# Patient Record
Sex: FEMALE | Race: WHITE | NOT HISPANIC OR LATINO | Employment: OTHER | URBAN - METROPOLITAN AREA
[De-identification: names, ages, dates, MRNs, and addresses within clinical notes are randomized per-mention and may not be internally consistent; named-entity substitution may affect disease eponyms.]

---

## 2018-07-11 ENCOUNTER — HOSPITAL ENCOUNTER (INPATIENT)
Facility: HOSPITAL | Age: 63
LOS: 9 days | Discharge: HOME HEALTH | End: 2018-07-20
Attending: SPECIALIST | Admitting: SPECIALIST

## 2018-07-11 ENCOUNTER — HOSPITAL ENCOUNTER (EMERGENCY)
Facility: HOSPITAL | Age: 63
End: 2018-07-11
Attending: EMERGENCY MEDICINE

## 2018-07-11 VITALS
OXYGEN SATURATION: 98 % | RESPIRATION RATE: 18 BRPM | SYSTOLIC BLOOD PRESSURE: 136 MMHG | WEIGHT: 185 LBS | HEIGHT: 65 IN | HEART RATE: 110 BPM | BODY MASS INDEX: 30.82 KG/M2 | TEMPERATURE: 97.9 F | DIASTOLIC BLOOD PRESSURE: 102 MMHG

## 2018-07-11 DIAGNOSIS — F31.9 BIPOLAR 1 DISORDER (HCC): Primary | ICD-10-CM

## 2018-07-11 LAB
ALBUMIN SERPL-MCNC: 3.4 G/DL (ref 3.5–5.2)
ALBUMIN/GLOB SERPL: 0.9 G/DL
ALP SERPL-CCNC: 83 U/L (ref 39–117)
ALT SERPL W P-5'-P-CCNC: 130 U/L (ref 1–33)
AMPHET+METHAMPHET UR QL: NEGATIVE
ANION GAP SERPL CALCULATED.3IONS-SCNC: 14.7 MMOL/L
AST SERPL-CCNC: 128 U/L (ref 1–32)
BACTERIA UR QL AUTO: NORMAL /HPF
BARBITURATES UR QL SCN: NEGATIVE
BASOPHILS # BLD AUTO: 0.02 10*3/MM3 (ref 0–0.2)
BASOPHILS NFR BLD AUTO: 0.2 % (ref 0–1.5)
BENZODIAZ UR QL SCN: NEGATIVE
BILIRUB SERPL-MCNC: 0.5 MG/DL (ref 0.1–1.2)
BILIRUB UR QL STRIP: NEGATIVE
BUN BLD-MCNC: 19 MG/DL (ref 8–23)
BUN/CREAT SERPL: 23.8 (ref 7–25)
CALCIUM SPEC-SCNC: 8.4 MG/DL (ref 8.6–10.5)
CANNABINOIDS SERPL QL: NEGATIVE
CHLORIDE SERPL-SCNC: 97 MMOL/L (ref 98–107)
CHOLEST SERPL-MCNC: 132 MG/DL (ref 0–200)
CLARITY UR: CLEAR
CO2 SERPL-SCNC: 23.3 MMOL/L (ref 22–29)
COCAINE UR QL: NEGATIVE
COLOR UR: ABNORMAL
CREAT BLD-MCNC: 0.8 MG/DL (ref 0.57–1)
DEPRECATED RDW RBC AUTO: 48.8 FL (ref 37–54)
EOSINOPHIL # BLD AUTO: 0.01 10*3/MM3 (ref 0–0.7)
EOSINOPHIL NFR BLD AUTO: 0.1 % (ref 0.3–6.2)
ERYTHROCYTE [DISTWIDTH] IN BLOOD BY AUTOMATED COUNT: 15.7 % (ref 11.7–13)
ETHANOL BLD-MCNC: <10 MG/DL (ref 0–10)
ETHANOL UR QL: <0.01 %
GFR SERPL CREATININE-BSD FRML MDRD: 72 ML/MIN/1.73
GLOBULIN UR ELPH-MCNC: 4 GM/DL
GLUCOSE BLD-MCNC: 282 MG/DL (ref 65–99)
GLUCOSE UR STRIP-MCNC: ABNORMAL MG/DL
HCT VFR BLD AUTO: 39.3 % (ref 35.6–45.5)
HDLC SERPL-MCNC: 33 MG/DL (ref 40–60)
HGB BLD-MCNC: 12.4 G/DL (ref 11.9–15.5)
HGB UR QL STRIP.AUTO: NEGATIVE
HYALINE CASTS UR QL AUTO: NORMAL /LPF
IMM GRANULOCYTES # BLD: 0.02 10*3/MM3 (ref 0–0.03)
IMM GRANULOCYTES NFR BLD: 0.2 % (ref 0–0.5)
KETONES UR QL STRIP: ABNORMAL
LDLC SERPL CALC-MCNC: 73 MG/DL (ref 0–100)
LDLC/HDLC SERPL: 2.21 {RATIO}
LEUKOCYTE ESTERASE UR QL STRIP.AUTO: NEGATIVE
LYMPHOCYTES # BLD AUTO: 2 10*3/MM3 (ref 0.9–4.8)
LYMPHOCYTES NFR BLD AUTO: 17.7 % (ref 19.6–45.3)
MCH RBC QN AUTO: 26.8 PG (ref 26.9–32)
MCHC RBC AUTO-ENTMCNC: 31.6 G/DL (ref 32.4–36.3)
MCV RBC AUTO: 84.9 FL (ref 80.5–98.2)
METHADONE UR QL SCN: NEGATIVE
MONOCYTES # BLD AUTO: 0.7 10*3/MM3 (ref 0.2–1.2)
MONOCYTES NFR BLD AUTO: 6.2 % (ref 5–12)
NEUTROPHILS # BLD AUTO: 8.54 10*3/MM3 (ref 1.9–8.1)
NEUTROPHILS NFR BLD AUTO: 75.8 % (ref 42.7–76)
NITRITE UR QL STRIP: NEGATIVE
OPIATES UR QL: NEGATIVE
OXYCODONE UR QL SCN: NEGATIVE
PH UR STRIP.AUTO: <=5 [PH] (ref 5–8)
PLATELET # BLD AUTO: 246 10*3/MM3 (ref 140–500)
PMV BLD AUTO: 10.7 FL (ref 6–12)
POTASSIUM BLD-SCNC: 3.1 MMOL/L (ref 3.5–5.2)
PROT SERPL-MCNC: 7.4 G/DL (ref 6–8.5)
PROT UR QL STRIP: ABNORMAL
RBC # BLD AUTO: 4.63 10*6/MM3 (ref 3.9–5.2)
RBC # UR: NORMAL /HPF
REF LAB TEST METHOD: NORMAL
SODIUM BLD-SCNC: 135 MMOL/L (ref 136–145)
SP GR UR STRIP: >=1.03 (ref 1–1.03)
SQUAMOUS #/AREA URNS HPF: NORMAL /HPF
T4 FREE SERPL-MCNC: 1.29 NG/DL (ref 0.93–1.7)
TRIGL SERPL-MCNC: 130 MG/DL (ref 0–150)
TSH SERPL DL<=0.05 MIU/L-ACNC: 2.03 MIU/ML (ref 0.27–4.2)
UROBILINOGEN UR QL STRIP: ABNORMAL
VLDLC SERPL-MCNC: 26 MG/DL (ref 5–40)
WBC NRBC COR # BLD: 11.27 10*3/MM3 (ref 4.5–10.7)
WBC UR QL AUTO: NORMAL /HPF

## 2018-07-11 PROCEDURE — 81001 URINALYSIS AUTO W/SCOPE: CPT | Performed by: PHYSICIAN ASSISTANT

## 2018-07-11 PROCEDURE — 90791 PSYCH DIAGNOSTIC EVALUATION: CPT | Performed by: SOCIAL WORKER

## 2018-07-11 PROCEDURE — 80307 DRUG TEST PRSMV CHEM ANLYZR: CPT | Performed by: PHYSICIAN ASSISTANT

## 2018-07-11 PROCEDURE — 84439 ASSAY OF FREE THYROXINE: CPT | Performed by: SPECIALIST

## 2018-07-11 PROCEDURE — 80053 COMPREHEN METABOLIC PANEL: CPT | Performed by: PHYSICIAN ASSISTANT

## 2018-07-11 PROCEDURE — 85025 COMPLETE CBC W/AUTO DIFF WBC: CPT | Performed by: PHYSICIAN ASSISTANT

## 2018-07-11 PROCEDURE — 25010000002 ZIPRASIDONE MESYLATE PER 10 MG: Performed by: SPECIALIST

## 2018-07-11 PROCEDURE — 80061 LIPID PANEL: CPT | Performed by: SPECIALIST

## 2018-07-11 PROCEDURE — 84443 ASSAY THYROID STIM HORMONE: CPT | Performed by: SPECIALIST

## 2018-07-11 RX ORDER — LOSARTAN POTASSIUM 25 MG/1
25 TABLET ORAL DAILY
Status: ON HOLD | COMMUNITY
End: 2022-08-19

## 2018-07-11 RX ORDER — HYDROCHLOROTHIAZIDE 25 MG/1
25 TABLET ORAL DAILY
COMMUNITY
End: 2022-08-20 | Stop reason: HOSPADM

## 2018-07-11 RX ORDER — DONEPEZIL HYDROCHLORIDE 10 MG/1
10 TABLET, FILM COATED ORAL 2 TIMES DAILY
COMMUNITY
End: 2018-07-20 | Stop reason: HOSPADM

## 2018-07-11 RX ORDER — ACETAMINOPHEN 325 MG/1
650 TABLET ORAL EVERY 4 HOURS PRN
Status: DISCONTINUED | OUTPATIENT
Start: 2018-07-11 | End: 2018-07-20 | Stop reason: HOSPADM

## 2018-07-11 RX ORDER — OLANZAPINE 10 MG/1
10 TABLET, ORALLY DISINTEGRATING ORAL ONCE
Status: COMPLETED | OUTPATIENT
Start: 2018-07-11 | End: 2018-07-11

## 2018-07-11 RX ORDER — ZIPRASIDONE MESYLATE 20 MG/ML
20 INJECTION, POWDER, LYOPHILIZED, FOR SOLUTION INTRAMUSCULAR ONCE
Status: COMPLETED | OUTPATIENT
Start: 2018-07-11 | End: 2018-07-11

## 2018-07-11 RX ORDER — SODIUM CHLORIDE 0.9 % (FLUSH) 0.9 %
10 SYRINGE (ML) INJECTION AS NEEDED
Status: DISCONTINUED | OUTPATIENT
Start: 2018-07-11 | End: 2018-07-11 | Stop reason: HOSPADM

## 2018-07-11 RX ORDER — ARIPIPRAZOLE 5 MG/1
5 TABLET ORAL DAILY
COMMUNITY
End: 2018-07-20 | Stop reason: HOSPADM

## 2018-07-11 RX ORDER — DONEPEZIL HYDROCHLORIDE 10 MG/1
10 TABLET, FILM COATED ORAL 2 TIMES DAILY
Status: DISCONTINUED | OUTPATIENT
Start: 2018-07-12 | End: 2018-07-12 | Stop reason: SDUPTHER

## 2018-07-11 RX ORDER — PANTOPRAZOLE SODIUM 40 MG/1
40 TABLET, DELAYED RELEASE ORAL DAILY
COMMUNITY
End: 2021-10-17 | Stop reason: SDUPTHER

## 2018-07-11 RX ORDER — ALUMINA, MAGNESIA, AND SIMETHICONE 2400; 2400; 240 MG/30ML; MG/30ML; MG/30ML
15 SUSPENSION ORAL EVERY 6 HOURS PRN
Status: DISCONTINUED | OUTPATIENT
Start: 2018-07-11 | End: 2018-07-20 | Stop reason: HOSPADM

## 2018-07-11 RX ADMIN — ZIPRASIDONE MESYLATE 20 MG: 20 INJECTION, POWDER, LYOPHILIZED, FOR SOLUTION INTRAMUSCULAR at 21:37

## 2018-07-11 RX ADMIN — OLANZAPINE 10 MG: 10 TABLET, ORALLY DISINTEGRATING ORAL at 14:34

## 2018-07-11 NOTE — ED PROVIDER NOTES
EMERGENCY DEPARTMENT ENCOUNTER    Room Number:  41/41  Date seen:  7/16/2018  Time seen: 2:12 PM  PCP: No Known Provider  Historian: Patient/Brother      HPI:  Chief complaint: Abnormal behavior  Context: Krupa Mcmanus is a 62 y.o. female who presents to the ED c/o several week h/o declining mental health and inability to care for self.  Pt follows with [facility].  Pt's brother is unsure of her medications.  Pt is not suicidal.  She has apparently been admitted to other [facility] hospitals in the past. She has not been sleeping or eating well lately, and has been talking excessively and nonsensically.        MEDICAL RECORD REVIEW   No previous medical records at [facility]    ALLERGIES  Penicillins and Zyprexa [olanzapine]    PAST MEDICAL HISTORY  Active Ambulatory Problems     Diagnosis Date Noted   • Affective psychosis, bipolar (CMS/[facility]) 07/11/2018   • Essential hypertension 07/12/2018   • Mild intermittent asthma without complication 07/12/2018   • Hypokalemia 07/12/2018   • Leukocytosis 07/12/2018   • Elevated liver enzymes 07/12/2018   • Type 2 diabetes mellitus (CMS/[facility]) 07/12/2018     Resolved Ambulatory Problems     Diagnosis Date Noted   • No Resolved Ambulatory Problems     No Additional Past Medical History       PAST SURGICAL HISTORY  No past surgical history on file.    FAMILY HISTORY  No family history on file.    SOCIAL HISTORY  Social History     Social History   • Marital status:      Spouse name: N/A   • Number of children: N/A   • Years of education: N/A     Occupational History   • Not on file.     Social History Main Topics   • Smoking status: Not on file   • Smokeless tobacco: Not on file   • Alcohol use Not on file   • Drug use: Unknown   • Sexual activity: Not on file     Other Topics Concern   • Not on file     Social History Narrative   • No narrative on file           REVIEW OF SYSTEMS  Review of Systems   Constitutional: Negative for chills and fever.   Respiratory: Negative for chest  tightness and shortness of breath.    Cardiovascular: Negative for chest pain.   Gastrointestinal: Negative for abdominal pain, diarrhea and vomiting.   Genitourinary: Negative for dysuria.   Musculoskeletal: Negative for arthralgias.   Neurological: Negative for weakness.   Psychiatric/Behavioral: Positive for agitation, behavioral problems and sleep disturbance. Negative for hallucinations and self-injury.   All other systems reviewed and are negative.          PHYSICAL EXAM  ED Triage Vitals   Temp Heart Rate Resp BP SpO2   07/11/18 1325 07/11/18 1325 07/11/18 1325 07/11/18 1353 07/11/18 1325   97.9 °F (36.6 °C) (!) 150 17 136/87 96 %      Temp src Heart Rate Source Patient Position BP Location FiO2 (%)   07/11/18 1325 07/11/18 1325 -- -- --   Tympanic Monitor        Physical Exam   Constitutional: She is oriented to person, place, and time.   Disheveled.  Covered in urine.     Cardiovascular: Normal rate and regular rhythm.    Pulmonary/Chest: Breath sounds normal. No respiratory distress.   Abdominal: There is no tenderness.   Neurological: She is alert and oriented to person, place, and time.   Skin: Skin is warm and dry.   Psychiatric:   Anxious, repetitive, agitated   Nursing note and vitals reviewed.        LAB RESULTS  Recent Results (from the past 24 hour(s))   POC Glucose Once    Collection Time: 07/15/18  5:14 PM   Result Value Ref Range    Glucose 170 (H) 70 - 130 mg/dL   POC Glucose Once    Collection Time: 07/15/18  7:41 PM   Result Value Ref Range    Glucose 113 70 - 130 mg/dL   POC Glucose Once    Collection Time: 07/16/18  7:53 AM   Result Value Ref Range    Glucose 139 (H) 70 - 130 mg/dL       I ordered the above labs and reviewed the results        RADIOLOGY  No orders to display       MEDICATIONS GIVEN IN ER  Medications   OLANZapine zydis (ZyPREXA) disintegrating tablet 10 mg (10 mg Oral Given 7/11/18 8714)           PROCEDURES  Procedures        COURSE & MEDICAL DECISION MAKING  Pertinent  "Labs and Imaging studies that were ordered and reviewed are noted above.  Results were reviewed/discussed with the patient and they were also made aware of online assess.  Pt also made aware that some labs, such as cultures, will not be resulted during ER visit and follow up with PMD is necessary.         PROGRESS AND CONSULTS    Progress Notes:       1420  Access consulted    1630 Reviewed pt's history and workup with Dr. Negro (ER physician).  After a bedside evaluation, Dr. Negro agrees with the plan of care    1700 Based on the patient's lab findings and presenting symptoms, the doctor and I feel it is appropriate to admit the patient for further management, evaluation, and treatment.  I have discussed this with the admitting team.  I have also discussed this with the patient/family.  They are in agreement with admission.          Disposition vitals:  BP (!) 136/102   Pulse 110   Temp 97.9 °F (36.6 °C) (Tympanic)   Resp 18   Ht 165.1 cm (65\")   Wt 83.9 kg (185 lb)   LMP 07/11/2018 Comment: postmenopausal  SpO2 98%   BMI 30.79 kg/m²         DIAGNOSIS  Final diagnoses:   Bipolar 1 disorder (CMS/HCC)         DISPOSITION  Admitted to Behavioral Health      FOLLOW UP   No follow-up provider specified.        RX     Medication List      No changes were made to your prescriptions during this visit.                       LA Johnson  07/16/18 4613    "

## 2018-07-11 NOTE — ED NOTES
Access at bedside with patient, family with patient also.      Stephanie Ramirez RN  07/11/18 7439

## 2018-07-11 NOTE — ED NOTES
Pt's brother reported manic behavior x 2 weeks. Pt called access, who told pt to be evaluated in ER     Susannah Nowak RN  07/11/18 4577

## 2018-07-11 NOTE — CONSULTS
"[facility] evaluated pt. Pt's brother and  were in room with pt's permission. Pt's brother stated there has been a significant change in pt's behavior in the last \"2 to 3 weeks.\" He states pt is having \"wild mood swings.\" He states she is crying uncontrollably for a period of time and has been more agitated where she will start \"cussing\" at her  more. Brother states pt has had a bipolar dx since she was a teen. Pt stated it was \"2001\" when asked the year. She knew the president and what hospital she is in.  describes pt as more forgetful, incontinent at times, stays in bed for long periods of time and refuses to leave the house.  states pt will lay on her bed naked and refuse to put clothes on sometimes. He states her hygiene is \"awful.\" Pt states she was hearing voices a couple of weeks ago but hasn't since then. Pt states she had one suicide attempt where she tried to cut herself with scissors. Pt was unsure of when she did that. Pt's  thought it was in 2005. Pt denies any current SI/HI. Pt's  states pt's appetite has \"diminished.\" Pt states she takes several meds including Abilify and Prozac.They were prescribed by Dr Lion with [facility]. Pt's  states pt hasn't seen Dr Lion in a year and pt's PCP continues to prescribe them.  states he makes sure she takes her meds though\" she will miss a dose on an occasion.\" Pt has been  25 yrs. No children.  states they are working with a neurologist to see if pt has any dementia. Access discussed case with Dr Ramesh who agreed to admit pt to [facility]. Family in agreement. Pt is ok with plan. Discussed with SIMON TOVAR who agrees with plan.  "

## 2018-07-11 NOTE — NURSING NOTE
I spoke with  Jose, [phone], who reports pt's PCP is Dr. Wilkes with [facility]. Doctor told  pt may have developing dementia, but  has not received definitive diagnosis of this.  reports hx of depression, and HTN, no other physical health problems. Pt reports she takes spiriva for asthma, and uses no rescue inhaler. I called report to FERNANDO Ellis RN. I received admission orders from Dr. Ramesh.

## 2018-07-11 NOTE — ED NOTES
Patient urinated on herself, I offered patient a new gown and also gave her a bag for her clothes, Patient refusing to change, states that she is fine and she does not want to. I left gown and bags in room for patient, she still stating that she was not going to change.      Stephanie Ramirez RN  07/11/18 5970

## 2018-07-12 ENCOUNTER — APPOINTMENT (OUTPATIENT)
Dept: CT IMAGING | Facility: HOSPITAL | Age: 63
End: 2018-07-12

## 2018-07-12 PROBLEM — D72.829 LEUKOCYTOSIS: Status: ACTIVE | Noted: 2018-07-12

## 2018-07-12 PROBLEM — R74.8 ELEVATED LIVER ENZYMES: Status: ACTIVE | Noted: 2018-07-12

## 2018-07-12 PROBLEM — J45.20 MILD INTERMITTENT ASTHMA WITHOUT COMPLICATION: Status: ACTIVE | Noted: 2018-07-12

## 2018-07-12 PROBLEM — E11.9 TYPE 2 DIABETES MELLITUS: Status: ACTIVE | Noted: 2018-07-12

## 2018-07-12 PROBLEM — I10 ESSENTIAL HYPERTENSION: Status: ACTIVE | Noted: 2018-07-12

## 2018-07-12 PROBLEM — E87.6 HYPOKALEMIA: Status: ACTIVE | Noted: 2018-07-12

## 2018-07-12 LAB
ALBUMIN SERPL-MCNC: 3.1 G/DL (ref 3.5–5.2)
ALBUMIN/GLOB SERPL: 0.8 G/DL
ALP SERPL-CCNC: 82 U/L (ref 39–117)
ALT SERPL W P-5'-P-CCNC: 123 U/L (ref 1–33)
AMMONIA BLD-SCNC: 36 UMOL/L (ref 11–51)
ANION GAP SERPL CALCULATED.3IONS-SCNC: 20.1 MMOL/L
AST SERPL-CCNC: 118 U/L (ref 1–32)
BASOPHILS # BLD AUTO: 0.04 10*3/MM3 (ref 0–0.2)
BASOPHILS NFR BLD AUTO: 0.5 % (ref 0–1.5)
BILIRUB SERPL-MCNC: 0.8 MG/DL (ref 0.1–1.2)
BUN BLD-MCNC: 14 MG/DL (ref 8–23)
BUN/CREAT SERPL: 15.7 (ref 7–25)
CALCIUM SPEC-SCNC: 9 MG/DL (ref 8.6–10.5)
CHLORIDE SERPL-SCNC: 100 MMOL/L (ref 98–107)
CO2 SERPL-SCNC: 18.9 MMOL/L (ref 22–29)
CREAT BLD-MCNC: 0.89 MG/DL (ref 0.57–1)
DEPRECATED RDW RBC AUTO: 49.7 FL (ref 37–54)
EOSINOPHIL # BLD AUTO: 0 10*3/MM3 (ref 0–0.7)
EOSINOPHIL NFR BLD AUTO: 0 % (ref 0.3–6.2)
ERYTHROCYTE [DISTWIDTH] IN BLOOD BY AUTOMATED COUNT: 15.4 % (ref 11.7–13)
GFR SERPL CREATININE-BSD FRML MDRD: 64 ML/MIN/1.73
GLOBULIN UR ELPH-MCNC: 3.8 GM/DL
GLUCOSE BLD-MCNC: 273 MG/DL (ref 65–99)
GLUCOSE BLDC GLUCOMTR-MCNC: 122 MG/DL (ref 70–130)
GLUCOSE BLDC GLUCOMTR-MCNC: 125 MG/DL (ref 70–130)
GLUCOSE BLDC GLUCOMTR-MCNC: 162 MG/DL (ref 70–130)
HAV IGM SERPL QL IA: NORMAL
HBA1C MFR BLD: 6.52 % (ref 4.8–5.6)
HBV CORE IGM SERPL QL IA: NORMAL
HBV SURFACE AG SERPL QL IA: NORMAL
HCT VFR BLD AUTO: 38.2 % (ref 35.6–45.5)
HCV AB SER DONR QL: NORMAL
HGB BLD-MCNC: 11.5 G/DL (ref 11.9–15.5)
IMM GRANULOCYTES # BLD: 0.02 10*3/MM3 (ref 0–0.03)
IMM GRANULOCYTES NFR BLD: 0.2 % (ref 0–0.5)
INR PPP: 1.06 (ref 0.9–1.1)
LYMPHOCYTES # BLD AUTO: 1.16 10*3/MM3 (ref 0.9–4.8)
LYMPHOCYTES NFR BLD AUTO: 13.7 % (ref 19.6–45.3)
MCH RBC QN AUTO: 26.4 PG (ref 26.9–32)
MCHC RBC AUTO-ENTMCNC: 30.1 G/DL (ref 32.4–36.3)
MCV RBC AUTO: 87.8 FL (ref 80.5–98.2)
MONOCYTES # BLD AUTO: 0.5 10*3/MM3 (ref 0.2–1.2)
MONOCYTES NFR BLD AUTO: 5.9 % (ref 5–12)
NEUTROPHILS # BLD AUTO: 6.74 10*3/MM3 (ref 1.9–8.1)
NEUTROPHILS NFR BLD AUTO: 79.7 % (ref 42.7–76)
PLATELET # BLD AUTO: 231 10*3/MM3 (ref 140–500)
PMV BLD AUTO: 11.1 FL (ref 6–12)
POTASSIUM BLD-SCNC: 3.1 MMOL/L (ref 3.5–5.2)
PROT SERPL-MCNC: 6.9 G/DL (ref 6–8.5)
PROTHROMBIN TIME: 13.6 SECONDS (ref 11.7–14.2)
RBC # BLD AUTO: 4.35 10*6/MM3 (ref 3.9–5.2)
SODIUM BLD-SCNC: 139 MMOL/L (ref 136–145)
WBC NRBC COR # BLD: 8.46 10*3/MM3 (ref 4.5–10.7)

## 2018-07-12 PROCEDURE — 80074 ACUTE HEPATITIS PANEL: CPT | Performed by: INTERNAL MEDICINE

## 2018-07-12 PROCEDURE — 63710000001 INSULIN ASPART PER 5 UNITS: Performed by: INTERNAL MEDICINE

## 2018-07-12 PROCEDURE — 85610 PROTHROMBIN TIME: CPT | Performed by: INTERNAL MEDICINE

## 2018-07-12 PROCEDURE — 85025 COMPLETE CBC W/AUTO DIFF WBC: CPT | Performed by: INTERNAL MEDICINE

## 2018-07-12 PROCEDURE — 82140 ASSAY OF AMMONIA: CPT | Performed by: INTERNAL MEDICINE

## 2018-07-12 PROCEDURE — 83036 HEMOGLOBIN GLYCOSYLATED A1C: CPT | Performed by: INTERNAL MEDICINE

## 2018-07-12 PROCEDURE — 82962 GLUCOSE BLOOD TEST: CPT

## 2018-07-12 PROCEDURE — 74176 CT ABD & PELVIS W/O CONTRAST: CPT

## 2018-07-12 PROCEDURE — 80053 COMPREHEN METABOLIC PANEL: CPT | Performed by: INTERNAL MEDICINE

## 2018-07-12 RX ORDER — LOSARTAN POTASSIUM 25 MG/1
25 TABLET ORAL DAILY
Status: DISCONTINUED | OUTPATIENT
Start: 2018-07-12 | End: 2018-07-20 | Stop reason: HOSPADM

## 2018-07-12 RX ORDER — ARIPIPRAZOLE 5 MG/1
5 TABLET ORAL DAILY
Status: DISCONTINUED | OUTPATIENT
Start: 2018-07-12 | End: 2018-07-20 | Stop reason: HOSPADM

## 2018-07-12 RX ORDER — NICOTINE POLACRILEX 4 MG
15 LOZENGE BUCCAL
Status: DISCONTINUED | OUTPATIENT
Start: 2018-07-12 | End: 2018-07-20 | Stop reason: HOSPADM

## 2018-07-12 RX ORDER — HYDROCHLOROTHIAZIDE 25 MG/1
25 TABLET ORAL DAILY
Status: DISCONTINUED | OUTPATIENT
Start: 2018-07-12 | End: 2018-07-12

## 2018-07-12 RX ORDER — DONEPEZIL HYDROCHLORIDE 10 MG/1
10 TABLET, FILM COATED ORAL 2 TIMES DAILY
Status: DISCONTINUED | OUTPATIENT
Start: 2018-07-12 | End: 2018-07-20 | Stop reason: HOSPADM

## 2018-07-12 RX ORDER — PANTOPRAZOLE SODIUM 40 MG/1
40 TABLET, DELAYED RELEASE ORAL
Status: DISCONTINUED | OUTPATIENT
Start: 2018-07-12 | End: 2018-07-20 | Stop reason: HOSPADM

## 2018-07-12 RX ORDER — POTASSIUM CHLORIDE 750 MG/1
40 CAPSULE, EXTENDED RELEASE ORAL 2 TIMES DAILY WITH MEALS
Status: COMPLETED | OUTPATIENT
Start: 2018-07-12 | End: 2018-07-12

## 2018-07-12 RX ORDER — DEXTROSE MONOHYDRATE 25 G/50ML
25 INJECTION, SOLUTION INTRAVENOUS
Status: DISCONTINUED | OUTPATIENT
Start: 2018-07-12 | End: 2018-07-20 | Stop reason: HOSPADM

## 2018-07-12 RX ORDER — FLUOXETINE 10 MG/1
10 CAPSULE ORAL DAILY
Status: DISCONTINUED | OUTPATIENT
Start: 2018-07-12 | End: 2018-07-20 | Stop reason: HOSPADM

## 2018-07-12 RX ADMIN — POTASSIUM CHLORIDE 40 MEQ: 750 CAPSULE, EXTENDED RELEASE ORAL at 18:24

## 2018-07-12 RX ADMIN — DONEPEZIL HYDROCHLORIDE 10 MG: 10 TABLET, FILM COATED ORAL at 23:46

## 2018-07-12 RX ADMIN — POTASSIUM CHLORIDE 40 MEQ: 750 CAPSULE, EXTENDED RELEASE ORAL at 12:20

## 2018-07-12 RX ADMIN — LOSARTAN POTASSIUM 25 MG: 25 TABLET, FILM COATED ORAL at 12:20

## 2018-07-12 RX ADMIN — FLUOXETINE HYDROCHLORIDE 10 MG: 10 CAPSULE ORAL at 12:20

## 2018-07-12 RX ADMIN — PANTOPRAZOLE SODIUM 40 MG: 40 TABLET, DELAYED RELEASE ORAL at 12:19

## 2018-07-12 RX ADMIN — ARIPIPRAZOLE 5 MG: 5 TABLET ORAL at 12:19

## 2018-07-12 RX ADMIN — INSULIN ASPART 2 UNITS: 100 INJECTION, SOLUTION INTRAVENOUS; SUBCUTANEOUS at 12:32

## 2018-07-12 RX ADMIN — DONEPEZIL HYDROCHLORIDE 10 MG: 10 TABLET, FILM COATED ORAL at 12:19

## 2018-07-12 NOTE — PLAN OF CARE
Problem: Patient Care Overview  Goal: Discharge Needs Assessment  Outcome: Ongoing (interventions implemented as appropriate)   07/12/18 1048 07/12/18 1050   Discharge Needs Assessment   Readmission Within the Last 30 Days no previous admission in last 30 days --    Concerns to be Addressed basic needs;coping/stress;decision making;medication;cognitive/perceptual;compliance issue;mental health;relationship --    Patient/Family Anticipates Transition to home with family --    Transportation Anticipated family or friend will provide --    Discharge Coordination/Progress --  Pt will return home with brother, whom she is currently living with. Pt will receive a family session and neuropsych testing. SW will explore outpatient providers for mental health prior to D/C.

## 2018-07-12 NOTE — PLAN OF CARE
Problem: Suicidal Behavior (Adult)  Goal: Suicidal Behavior is Absent/Minimized/Managed  Outcome: Ongoing (interventions implemented as appropriate)   07/12/18 0323   Suicidal Behavior is Absent/Minimized/Managed   Suicidal Behavior Managed/Minimized Time Frame for Action Step (STG) 4 days   Suicidal Behavior Managed/Minimized Action Step (STG) Outcome achieves outcome   OTHER   Action Step/Short Term Goal (STG) Established 07/12/18

## 2018-07-12 NOTE — PLAN OF CARE
Problem: Patient Care Overview  Goal: Individualization and Mutuality  Outcome: Ongoing (interventions implemented as appropriate)   07/12/18 1128   Personal Strengths/Vulnerabilities   Patient Personal Strengths family/social support;stable living environment     Goal: Interprofessional Rounds/Family Conf  Outcome: Ongoing (interventions implemented as appropriate)   07/12/18 1128   Interdisciplinary Rounds/Family Conf   Summary Treatment team met to discuss plan of care. Plan for Neuro Psych Testing.  to assess for additional discharge needs.   Interdisciplinary Rounds/Family Conf   Participants art therapy;;nursing;psychiatrist;pharmacy;social work     Patient/Guardian Signature: __________________________________            Psychiatrist Signature: ______________________________________             Therapist Signature: ________________________________________         Nurse Signature: ___________________________________________          Staff Signature: ____________________________________________            Staff Signature: ____________________________________________          Staff Signature: ____________________________________________          Staff Signature:                                                                                                      Problem: Suicidal Behavior (Adult)  Goal: Suicidal Behavior is Absent/Minimized/Managed  Outcome: Ongoing (interventions implemented as appropriate)   07/12/18 0323   Suicidal Behavior is Absent/Minimized/Managed   Suicidal Behavior Managed/Minimized Time Frame for Action Step (STG) 4 days   Suicidal Behavior Managed/Minimized Action Step (STG) Outcome achieves outcome   OTHER   Action Step/Short Term Goal (STG) Established 07/12/18

## 2018-07-12 NOTE — CONSULTS
Name: Krupa Mcmanus ADMIT: 2018   : 1955  PCP: No Known Provider    MRN: [id number] LOS: 1 days   AGE/SEX: 62 y.o. female  ROOM: 3324/1           Inpatient Hospitalist Consult  Consult performed by: PRAFUL HAMILTON  Consult ordered by: SYLVESTER SCOTT III  Reason for consult: medical management,      Date of Admit: 2018  Date of Consult: 18    Subjective   History of Present Illness    Patient is a 62-year-old female who was brought to the emergency room by her family or manic behavior for couple weeks.  3 is mostly unobtainable from the patient due to her psychiatric illness.  Most of the history is obtained by chart review and some of it from her.  She has been very emotionally labile for a few weeks.  She has poor hygiene and has been urinating and stooling on herself.  She reportedly has had hallucinations as well.  She was admitted to [facility] last night for stabilization.  Labs in the emergency room were abnormal.  Had elevated glucose at 282, hypokalemia, leukocytosis, increased ALT of 1:30 and AST of 128.  I've checked A1c today and it is in the diabetic range of 6.52.  Repeat labs show continued elevated liver enzymes and review from [facility] last year they were normal.  Her leukocytosis has resolved.  She is complaining of abdominal pain mostly on the right side but is unable to give any further history.    No past medical history on file.  No past surgical history on file.  No family history on file.  Social History   Substance Use Topics   • Smoking status: Not on file   • Smokeless tobacco: Not on file   • Alcohol use Not on file     Prescriptions Prior to Admission   Medication Sig Dispense Refill Last Dose   • Tiotropium Bromide Monohydrate (SPIRIVA RESPIMAT) 1.25 MCG/ACT aerosol solution inhaler Inhale 2 puffs Daily.      • ARIPiprazole (ABILIFY) 5 MG tablet Take 5 mg by mouth Daily.      • donepezil (ARICEPT) 10 MG tablet Take 10 mg by mouth 2 (Two)  Times a Day.      • hydrochlorothiazide (HYDRODIURIL) 25 MG tablet Take 25 mg by mouth Daily.      • losartan (COZAAR) 25 MG tablet Take 25 mg by mouth Daily.      • pantoprazole (PROTONIX) 40 MG EC tablet Take 40 mg by mouth Daily.        Allergies:  Penicillins and Zyprexa [olanzapine]    Review of Systems   Unable to perform ROS: Psychiatric disorder       Objective      Vital Signs  Temp:  [97.9 °F (36.6 °C)-98.9 °F (37.2 °C)] 98.9 °F (37.2 °C)  Heart Rate:  [] 95  Resp:  [17-18] 18  BP: (136-137)/() 137/72  There is no height or weight on file to calculate BMI.    Physical Exam   Constitutional: No distress.   Appears somewhat older than stated age   Eyes: Right eye exhibits no discharge. No scleral icterus.   Cardiovascular: Regular rhythm.  Tachycardia present.    No murmur heard.  Mildly tachycardic   Pulmonary/Chest: Effort normal and breath sounds normal. No respiratory distress.   Abdominal: Soft. Bowel sounds are normal. She exhibits no distension. There is tenderness (Diffuse but worse on the right upper quadrant). There is no rigidity and no guarding.   Musculoskeletal: She exhibits edema (Trace). She exhibits no tenderness.   Neurological: She is alert.   Skin: She is not diaphoretic. No erythema.   Chronic venous stasis changes bilaterally   Psychiatric: She has a normal mood and affect. Her speech is delayed and tangential. She is slowed and withdrawn. She is inattentive.   Vitals reviewed.      Results Review:    I reviewed the patient's new clinical results.      Assessment/Plan     Active Problems:    Affective psychosis, bipolar (CMS/HCC)    Essential hypertension    Mild intermittent asthma without complication    Hypokalemia    Leukocytosis    Elevated liver enzymes    Type 2 diabetes mellitus (CMS/HCC)      Assessment & Plan    Elevated liver enzymes: Unclear cause at this point.  Repeat shows consistent elevation of transaminases.  Given encephalopathy I'll check an ammonia and  INR.  Check acute viral panel.  Given abdominal pain also check CT abdomen.    Type 2 diabetes: Place on sliding scale insulin.  This is new diagnosis and when she gets more stable from a psychiatric standpoint I'll ask diabetic educators to see her.  Change diet to consistent carb.    Hypertension: Continue losartan but hold her hydrochlorothiazide    Leukocytosis: Resolved, suspect this is a reactive without any fevers    Hypokalemia: Replace    Mild intermittent asthma: Continue home medications.    Thank you very much for asking [facility] to be involved in this patient's care.  We will follow along with you.      Fuentes Ross MD  [facility]  07/12/18  12:25 PM

## 2018-07-12 NOTE — PLAN OF CARE
Problem: Patient Care Overview  Goal: Plan of Care Review  Outcome: Ongoing (interventions implemented as appropriate)   07/12/18 1300 07/12/18 1508   OTHER   Outcome Summary --  Pt A&O x4, fretful and voicing depression 4/10 caused by fear of moving homes. Pt given shower by staff this AM d/t body odor/incontinence of urine and unkempt appearance. Denied SI/HI, A/VH, and pain. Pt cooperative with care and compliant with meds. Will continue to monitor and provide safe environment.    Plan of Care Review   Progress --  no change   Coping/Psychosocial   Plan of Care Reviewed With patient --    Coping/Psychosocial   Patient Agreement with Plan of Care agrees --        Problem: Overarching Goals (Adult)  Goal: Adheres to Safety Considerations for Self and Others  Outcome: Ongoing (interventions implemented as appropriate)   07/12/18 1508   Overarching Goals (Adult)   Adheres to Safety Considerations for Self and Others making progress toward outcome     Intervention: Develop and Maintain Individualized Safety Plan   07/12/18 1300 07/12/18 1400   Violence Risk   Feels Like Hurting Others no --    Previous Attempt to Harm Others no --    C-SSRS (Recent)   Wish to be Dead no --    Suicidal Thoughts no --    Suicide Behavior no --    Develop and Maintain Individualized Safety Plan   Safety Measures --  safety rounds completed       Goal: Optimized Coping Skills in Response to Life Stressors  Outcome: Ongoing (interventions implemented as appropriate)   07/12/18 1508   Overarching Goals (Adult)   Optimized Coping Skills in Response to Life Stressors making progress toward outcome     Intervention: Promote Effective Coping Strategies   07/12/18 1300   Coping/Psychosocial Interventions   Supportive Measures active listening utilized;verbalization of feelings encouraged       Goal: Develops/Participates in Therapeutic [place] to Support Successful Transition  Outcome: Ongoing (interventions implemented as appropriate)    07/12/18 1508   Overarching Goals (Adult)   Develops/Participates in Therapeutic [place] to Support Successful Transition making progress toward outcome     Intervention: Foster Therapeutic [place]   07/12/18 1300   Interventions   Trust Relationship/Rapport care explained;choices provided;thoughts/feelings acknowledged;reassurance provided;questions answered     Intervention: Mutually Develop Transition Plan   07/12/18 1300   Mutually Develop Transition Plan   Transition Support crisis management plan promoted         Problem: Depression  Goal: Patient will demonstrate the ability to initiate new constructive life skills outside of sessions on a consistent basis.  Outcome: Ongoing (interventions implemented as appropriate)      Problem: Suicidal Behavior (Adult)  Goal: Suicidal Behavior is Absent/Minimized/Managed  Outcome: Ongoing (interventions implemented as appropriate)   07/12/18 0323 07/12/18 1508   Suicidal Behavior is Absent/Minimized/Managed   Suicidal Behavior Managed/Minimized Time Frame for Action Step (STG) --  4 days   Suicidal Behavior Managed/Minimized Action Step (STG) Outcome --  making progress toward outcome   OTHER   Action Step/Short Term Goal (STG) Established 07/12/18 --

## 2018-07-12 NOTE — PROGRESS NOTES
Clinical Pharmacy Services: Medication History    Krupa Mcmanus is a 62 y.o. female presenting to [facility] for Bipolar disorder, current episode depressed, severe, without psychotic features (CMS/[facility]) [F31.4]    She  has no past medical history on file.    Allergies as of 07/11/2018 - Reviewed 07/11/2018   Allergen Reaction Noted   • Penicillins Unknown (See Comments) 07/11/2018   • Zyprexa [olanzapine] Rash 07/11/2018       Medication information was obtained from: Pharmacy  Pharmacy and Phone Number: [facility] [address] [place], [place] - [address] [facility] [address] ([facility]) - [phone]  - [phone] FX    Prior to Admission Medications       Prescriptions Last Dose Informant Patient Reported? Taking?    Tiotropium Bromide Monohydrate (SPIRIVA RESPIMAT) 1.25 MCG/ACT aerosol solution inhaler  Pharmacy Yes Yes    Inhale 2 puffs Daily.    ARIPiprazole (ABILIFY) 5 MG tablet  Pharmacy Yes No    Take 5 mg by mouth Daily.    donepezil (ARICEPT) 10 MG tablet  Pharmacy Yes No    Take 10 mg by mouth 2 (Two) Times a Day.    hydrochlorothiazide (HYDRODIURIL) 25 MG tablet  Pharmacy Yes No    Take 25 mg by mouth Daily.    losartan (COZAAR) 25 MG tablet  Pharmacy Yes No    Take 25 mg by mouth Daily.    pantoprazole (PROTONIX) 40 MG EC tablet  Pharmacy Yes No    Take 40 mg by mouth Daily.          Medication notes: Medications were verified per pharmacy. Nexium 20 mg daily written on 5/24/18 was on the patient's profile but never filled. Spiriva was changed to Spiriva Respimat as listed above after speaking with pharmacy.    This medication list is complete to the best of my knowledge as of 7/12/2018    Please call if questions.    Jaclyn Ochsner, Pharmacy Intern  7/12/2018 11:36 AM

## 2018-07-12 NOTE — PLAN OF CARE
Problem: Depression  Goal: Patient will demonstrate the ability to initiate new constructive life skills outside of sessions on a consistent basis.  Outcome: Ongoing (interventions implemented as appropriate)

## 2018-07-12 NOTE — NURSING NOTE
Patient arrived on the unit accompanied by security and sitter. Patient became aggressive with staff when attempt to remove urine and feces soaked clothing.   Called and order  Received for  IM.  IM given, aggressive behaviors stopped but patient still refused staffs help to change and clean up.  Multiple  attempts were made.  Pt is resting comfortably in bed, will attempt again later.

## 2018-07-12 NOTE — H&P
"IDENTIFYING INFORMATION: The patient is a 62-year-old white female admitted with increasing depression and lack of attendance to activities of daily living.    CHIEF COMPLAINT:  None given    INFORMANT:  Patient and chart    RELIABILITY:  Poor    HISTORY OF PRESENT ILLNESS: The patient is a 62-year-old white female admitted to the crisis management unit after having been brought to this facility by family members yesterday.  The patient reportedly had a significant change in behavior beginning in the last 2-3 weeks.  The patient's family reports that she's been having \"wild mood swings\" characterized by uncontrollable crying and periods of agitation and profane speech.  The patient has had a history of a bipolar diagnosis since her teenage years she reports that she was psychic hospitalized around the year 2000.  She was last followed at [facility] but is been lost to follow-up since that time.  She continues to be prescribed Aricept and Abilify by her primary care physician.  When seen today the patient is oriented to person place and is able to identify the [place] Pres. but reports that the date is July 21, 2001.  The patient does have a history of 1 suicide attempt by means of cutting herself with scissors.  There is no reported use of psychoactive substances.    PAST PSYCHIATRIC HISTORY: As above    PAST MEDICAL HISTORY: Significant for COPD, hypertension, and GERD    MEDICATIONS:   Prescriptions Prior to Admission   Medication Sig Dispense Refill Last Dose   • Tiotropium Bromide Monohydrate (SPIRIVA RESPIMAT) 1.25 MCG/ACT aerosol solution inhaler Inhale 2 puffs Daily.      • ARIPiprazole (ABILIFY) 5 MG tablet Take 5 mg by mouth Daily.      • donepezil (ARICEPT) 10 MG tablet Take 10 mg by mouth 2 (Two) Times a Day.      • hydrochlorothiazide (HYDRODIURIL) 25 MG tablet Take 25 mg by mouth Daily.      • losartan (COZAAR) 25 MG tablet Take 25 mg by mouth Daily.      • pantoprazole (PROTONIX) 40 MG EC " tablet Take 40 mg by mouth Daily.            ALLERGIES:  Penicillin, Zyprexa    FAMILY HISTORY:  Noncontributory    SOCIAL HISTORY: Patient lives with her .  There is no reported use of alcohol tobacco or street drugs.    MENTAL STATUS EXAM: The patient is been overly white female dressed in hospital garb.  She is in a state of some physical dishevelment.  The patient is awake alert and is oriented to person and place.  She identifies the date as July 21, 2001 and identifies the night states Pres. correctly.  Patient's mood is calm her affect flat and strange.  Speech is impoverished but generally relevant coherent.  The patient recalls one of 3 objects after 5 minutes.  Her forward digit span is 5 reversed digit span 3.  She does continue to exhibit fairly good abstract thought processes.  She denies current suicidal or homicidal ideation.  She denies any psychotic symptoms at this time.  Her judgment and insight to exhibit some impairment.    ASSETS/LIABILITIES: Supportive family    DIAGNOSTIC IMPRESSION: Bipolar disorder depressed phase, dementia not otherwise specified, hypertension, GERD, COPD    PLAN:  The patient will continue Abilify and I will restart Prozac, a medication which the patient reportedly has history of positive response in combination with Abilify.  Consideration may be given to a neuro psychological evaluation.  The patient denies suicidal elation today and will not require suicide precautions.  Estimated length stay in the hospital 5-7 days.

## 2018-07-12 NOTE — PROGRESS NOTES
Continued Stay Note  [facility]     Patient Name: Krupa Mcmanus  MRN: [id number]  Today's Date: 7/12/2018    Admit Date: 7/11/2018          Discharge Plan     Row Name 07/12/18 1054       Plan    Plan Comments SW met with pt to review discharge planning. SW verified demographic information on face sheet. SW inquired about pt's outpatient providers for mental health and pt stated that she currently does not have a therapist and meets with PCP for medication management. Pt had some response lag during meeting. She provided permission to consult her spouse for treatment updates. SW will explore [facility] as an option for ongoing therapy and medication mgmt to address mental health issues. Pt agrees with this plan.    Row Name 07/12/18 0735       Plan    Plan Pt will return home with brother, whom she is currently living with. Pt will receive a family session and neuropsych testing. SW will explore outpatient providers for mental health prior to D/C.     Patient/Family in Agreement with Plan yes              Discharge Codes    No documentation.           Brea Bruce LCSW

## 2018-07-13 LAB
ALBUMIN SERPL-MCNC: 3.4 G/DL (ref 3.5–5.2)
ALBUMIN/GLOB SERPL: 0.9 G/DL
ALP SERPL-CCNC: 78 U/L (ref 39–117)
ALT SERPL W P-5'-P-CCNC: 115 U/L (ref 1–33)
ANION GAP SERPL CALCULATED.3IONS-SCNC: 12 MMOL/L
AST SERPL-CCNC: 111 U/L (ref 1–32)
BASOPHILS # BLD AUTO: 0.03 10*3/MM3 (ref 0–0.2)
BASOPHILS NFR BLD AUTO: 0.3 % (ref 0–1.5)
BILIRUB SERPL-MCNC: 1 MG/DL (ref 0.1–1.2)
BUN BLD-MCNC: 11 MG/DL (ref 8–23)
BUN/CREAT SERPL: 14.9 (ref 7–25)
CALCIUM SPEC-SCNC: 8.6 MG/DL (ref 8.6–10.5)
CHLORIDE SERPL-SCNC: 98 MMOL/L (ref 98–107)
CO2 SERPL-SCNC: 24 MMOL/L (ref 22–29)
CREAT BLD-MCNC: 0.74 MG/DL (ref 0.57–1)
DEPRECATED RDW RBC AUTO: 48.2 FL (ref 37–54)
EOSINOPHIL # BLD AUTO: 0 10*3/MM3 (ref 0–0.7)
EOSINOPHIL NFR BLD AUTO: 0 % (ref 0.3–6.2)
ERYTHROCYTE [DISTWIDTH] IN BLOOD BY AUTOMATED COUNT: 15.3 % (ref 11.7–13)
GFR SERPL CREATININE-BSD FRML MDRD: 79 ML/MIN/1.73
GLOBULIN UR ELPH-MCNC: 4 GM/DL
GLUCOSE BLD-MCNC: 141 MG/DL (ref 65–99)
GLUCOSE BLDC GLUCOMTR-MCNC: 126 MG/DL (ref 70–130)
GLUCOSE BLDC GLUCOMTR-MCNC: 146 MG/DL (ref 70–130)
GLUCOSE BLDC GLUCOMTR-MCNC: 153 MG/DL (ref 70–130)
GLUCOSE BLDC GLUCOMTR-MCNC: 166 MG/DL (ref 70–130)
HCT VFR BLD AUTO: 37.3 % (ref 35.6–45.5)
HGB BLD-MCNC: 11.3 G/DL (ref 11.9–15.5)
IMM GRANULOCYTES # BLD: 0 10*3/MM3 (ref 0–0.03)
IMM GRANULOCYTES NFR BLD: 0 % (ref 0–0.5)
LYMPHOCYTES # BLD AUTO: 2.17 10*3/MM3 (ref 0.9–4.8)
LYMPHOCYTES NFR BLD AUTO: 23.1 % (ref 19.6–45.3)
MCH RBC QN AUTO: 26 PG (ref 26.9–32)
MCHC RBC AUTO-ENTMCNC: 30.3 G/DL (ref 32.4–36.3)
MCV RBC AUTO: 85.9 FL (ref 80.5–98.2)
MONOCYTES # BLD AUTO: 0.8 10*3/MM3 (ref 0.2–1.2)
MONOCYTES NFR BLD AUTO: 8.5 % (ref 5–12)
NEUTROPHILS # BLD AUTO: 6.4 10*3/MM3 (ref 1.9–8.1)
NEUTROPHILS NFR BLD AUTO: 68.1 % (ref 42.7–76)
PLATELET # BLD AUTO: 257 10*3/MM3 (ref 140–500)
PMV BLD AUTO: 10.9 FL (ref 6–12)
POTASSIUM BLD-SCNC: 3.7 MMOL/L (ref 3.5–5.2)
PROT SERPL-MCNC: 7.4 G/DL (ref 6–8.5)
RBC # BLD AUTO: 4.34 10*6/MM3 (ref 3.9–5.2)
SODIUM BLD-SCNC: 134 MMOL/L (ref 136–145)
WBC NRBC COR # BLD: 9.4 10*3/MM3 (ref 4.5–10.7)

## 2018-07-13 PROCEDURE — 80053 COMPREHEN METABOLIC PANEL: CPT | Performed by: INTERNAL MEDICINE

## 2018-07-13 PROCEDURE — 63710000001 INSULIN ASPART PER 5 UNITS: Performed by: INTERNAL MEDICINE

## 2018-07-13 PROCEDURE — 82962 GLUCOSE BLOOD TEST: CPT

## 2018-07-13 PROCEDURE — 85025 COMPLETE CBC W/AUTO DIFF WBC: CPT | Performed by: INTERNAL MEDICINE

## 2018-07-13 PROCEDURE — 94640 AIRWAY INHALATION TREATMENT: CPT

## 2018-07-13 RX ADMIN — ARIPIPRAZOLE 5 MG: 5 TABLET ORAL at 08:10

## 2018-07-13 RX ADMIN — DONEPEZIL HYDROCHLORIDE 10 MG: 10 TABLET, FILM COATED ORAL at 08:10

## 2018-07-13 RX ADMIN — FLUOXETINE HYDROCHLORIDE 10 MG: 10 CAPSULE ORAL at 08:10

## 2018-07-13 RX ADMIN — LOSARTAN POTASSIUM 25 MG: 25 TABLET, FILM COATED ORAL at 08:10

## 2018-07-13 RX ADMIN — ALUMINUM HYDROXIDE, MAGNESIUM HYDROXIDE, AND DIMETHICONE 15 ML: 400; 400; 40 SUSPENSION ORAL at 00:46

## 2018-07-13 RX ADMIN — PANTOPRAZOLE SODIUM 40 MG: 40 TABLET, DELAYED RELEASE ORAL at 08:10

## 2018-07-13 RX ADMIN — TIOTROPIUM BROMIDE 1 CAPSULE: 18 CAPSULE ORAL; RESPIRATORY (INHALATION) at 07:48

## 2018-07-13 RX ADMIN — POLYETHYLENE GLYCOL 3350 17 G: 17 POWDER, FOR SOLUTION ORAL at 20:22

## 2018-07-13 RX ADMIN — ALUMINUM HYDROXIDE, MAGNESIUM HYDROXIDE, AND DIMETHICONE 15 ML: 400; 400; 40 SUSPENSION ORAL at 06:51

## 2018-07-13 RX ADMIN — INSULIN ASPART 2 UNITS: 100 INJECTION, SOLUTION INTRAVENOUS; SUBCUTANEOUS at 20:23

## 2018-07-13 RX ADMIN — DONEPEZIL HYDROCHLORIDE 10 MG: 10 TABLET, FILM COATED ORAL at 20:24

## 2018-07-13 NOTE — PROGRESS NOTES
The patient was incontinent of stool and urine last evening but has been more tended to activities of daily living.  She is pleasant during today's interview and reports a good visit with her  last evening.  She is tolerating medications without complaint.  I have encouraged her to increase her peers patient within the therapeutic milieu.  She denies any suicidal or homicidal thinking.  We await a marital therapy session

## 2018-07-13 NOTE — PLAN OF CARE
Problem: Patient Care Overview  Goal: Plan of Care Review   07/13/18 1440   OTHER   Outcome Summary Pt has been compliant with medications and cooperative with care. Pt frequently seeks out staff for various requests. Staff are encouraging pt to perform own hygiene. Pt denies SI, HI but is visibly anxious at times. Pt has attended groups but otherwise stays to self. Will continue to monitor pt for safety and any change in condition.

## 2018-07-13 NOTE — PLAN OF CARE
Problem: Patient Care Overview  Goal: Plan of Care Review  Outcome: Ongoing (interventions implemented as appropriate)   07/13/18 0543   OTHER   Outcome Summary Pt pleasant this evening, yet sought out staff often. Compliant with medications. Complaints of digestive issues, mylanta given per MAR. Denies SI, HI. Pt incontinent of both urine and stool this evening. Will continue to monitor.    Plan of Care Review   Progress no change   Coping/Psychosocial   Plan of Care Reviewed With patient   Coping/Psychosocial   Patient Agreement with Plan of Care agrees       Problem: Overarching Goals (Adult)  Goal: Adheres to Safety Considerations for Self and Others  Outcome: Ongoing (interventions implemented as appropriate)   07/13/18 0543   Overarching Goals (Adult)   Adheres to Safety Considerations for Self and Others making progress toward outcome     Intervention: Develop and Maintain Individualized Safety Plan   07/13/18 0300 07/13/18 0515   Violence Risk   Feels Like Hurting Others --  no   Previous Attempt to Harm Others --  no   C-SSRS (Recent)   Wish to be Dead --  no   Suicidal Thoughts --  no   Suicide Behavior --  no   Develop and Maintain Individualized Safety Plan   Safety Measures safety rounds completed --        Goal: Optimized Coping Skills in Response to Life Stressors  Outcome: Ongoing (interventions implemented as appropriate)   07/13/18 0543   Overarching Goals (Adult)   Optimized Coping Skills in Response to Life Stressors making progress toward outcome     Intervention: Promote Effective Coping Strategies   07/13/18 0515   Coping/Psychosocial Interventions   Supportive Measures active listening utilized;verbalization of feelings encouraged       Goal: Develops/Participates in Therapeutic [place] to Support Successful Transition  Outcome: Ongoing (interventions implemented as appropriate)   07/13/18 0543   Overarching Goals (Adult)   Develops/Participates in Therapeutic [place] to Support Successful  Transition making progress toward outcome     Intervention: Foster Therapeutic [place]   07/13/18 0515   Interventions   Trust Relationship/Rapport care explained;thoughts/feelings acknowledged     Intervention: Mutually Develop Transition Plan   07/13/18 0515   Mutually Develop Transition Plan   Transition Support crisis management plan promoted         Problem: Suicidal Behavior (Adult)  Goal: Suicidal Behavior is Absent/Minimized/Managed  Outcome: Ongoing (interventions implemented as appropriate)   07/13/18 0543   Suicidal Behavior is Absent/Minimized/Managed   Suicidal Behavior Managed/Minimized Time Frame for Action Step (STG) 3 days   Suicidal Behavior Managed/Minimized Action Step (STG) Outcome making progress toward outcome

## 2018-07-13 NOTE — PROGRESS NOTES
Continued Stay Note  [facility]     Patient Name: Krupa Mcmanus  MRN: [id number]  Today's Date: 7/13/2018    Admit Date: 7/11/2018          Discharge Plan     Row Name 07/13/18 1329       Plan    Plan Comments SW left voice message for pt's spouse, Jose Mcmanus [phone], to review discharge planning. JESENIA awaits response.               Discharge Codes    No documentation.           Brea Bruce LCSW

## 2018-07-13 NOTE — PLAN OF CARE
Problem: Suicidal Behavior (Adult)  Goal: Suicidal Behavior is Absent/Minimized/Managed  Outcome: Outcome(s) achieved Date Met: 07/13/18 07/13/18 1439   Suicidal Behavior is Absent/Minimized/Managed   Suicidal Behavior Managed/Minimized Action Step (STG) Outcome achieves outcome

## 2018-07-13 NOTE — PLAN OF CARE
Problem: Patient Care Overview  Goal: Plan of Care Review  Outcome: Ongoing (interventions implemented as appropriate)      Problem: Overarching Goals (Adult)  Goal: Adheres to Safety Considerations for Self and Others  Outcome: Ongoing (interventions implemented as appropriate)   07/13/18 1440   Overarching Goals (Adult)   Adheres to Safety Considerations for Self and Others making progress toward outcome     Intervention: Develop and Maintain Individualized Safety Plan   07/13/18 0900 07/13/18 1400   Violence Risk   Feels Like Hurting Others no --    Previous Attempt to Harm Others no --    C-SSRS (Recent)   Wish to be Dead no --    Suicidal Thoughts no --    Suicide Behavior no --    Develop and Maintain Individualized Safety Plan   Safety Measures --  safety rounds completed       Goal: Optimized Coping Skills in Response to Life Stressors  Outcome: Ongoing (interventions implemented as appropriate)   07/13/18 1440   Overarching Goals (Adult)   Optimized Coping Skills in Response to Life Stressors making progress toward outcome     Intervention: Promote Effective Coping Strategies   07/13/18 0900   Coping/Psychosocial Interventions   Supportive Measures active listening utilized;verbalization of feelings encouraged;self-care encouraged       Goal: Develops/Participates in Therapeutic [place] to Support Successful Transition  Outcome: Ongoing (interventions implemented as appropriate)   07/13/18 1440   Overarching Goals (Adult)   Develops/Participates in Therapeutic [place] to Support Successful Transition making progress toward outcome     Intervention: Foster Therapeutic [place]   07/13/18 0900   Interventions   Trust Relationship/Rapport care explained;thoughts/feelings acknowledged;questions encouraged;choices provided;empathic listening provided     Intervention: Mutually Develop Transition Plan   07/13/18 0900   Mutually Develop Transition Plan   Transition Support crisis management plan promoted          Problem: Depression  Goal: Patient will demonstrate the ability to initiate new constructive life skills outside of sessions on a consistent basis.  Outcome: Ongoing (interventions implemented as appropriate)

## 2018-07-13 NOTE — PROGRESS NOTES
Name: Krupa Mcmanus ADMIT: 2018   : 1955  PCP: No Known Provider    MRN: [id number] LOS: 2 days   AGE/SEX: 62 y.o. female  ROOM: [facility]   Subjective     Feels ok, wants to talk to her   Mild abd pain, needs to have a BM  No soa or chest pain    Objective   Vital Signs  Temp:  [98.2 °F (36.8 °C)-99.1 °F (37.3 °C)] 98.2 °F (36.8 °C)  Heart Rate:  [79-82] 79  Resp:  [18] 18  BP: (125-128)/(70-85) 125/70  SpO2:  [90 %-91 %] 91 %  on   ;   Device (Oxygen Therapy): room air  There is no height or weight on file to calculate BMI.    Physical Exam  Constitutional: No distress.   Appears somewhat older than stated age   Eyes: Right eye exhibits no discharge. No scleral icterus.   Cardiovascular: Regular rhythm.  Tachycardia present.    No murmur heard.  Pulmonary/Chest: Effort normal and breath sounds normal. No respiratory distress.   Abdominal: Soft. Bowel sounds are normal. She exhibits no distension. There is tenderness (Diffuse but worse on the right upper quadrant). There is no rigidity and no guarding.   Musculoskeletal: She exhibits edema (Trace). She exhibits no tenderness.   Neurological: She is alert.   Skin: She is not diaphoretic. No erythema.   Chronic venous stasis changes bilaterally   Psychiatric: She has a normal mood and affect. Her speech is delayed and tangential. She is slowed and withdrawn. She is inattentive.   Vitals reviewed.    Results Review:       I reviewed the patient's new clinical results.    Results from last 7 days  Lab Units 18  0756 18  0935 18  1356   WBC 10*3/mm3 9.40 8.46 11.27*   HEMOGLOBIN g/dL 11.3* 11.5* 12.4   PLATELETS 10*3/mm3 257 231 246     Results from last 7 days  Lab Units 18  0756 18  0935 18  1356   SODIUM mmol/L 134* 139 135*   POTASSIUM mmol/L 3.7 3.1* 3.1*   CHLORIDE mmol/L 98 100 97*   CO2 mmol/L 24.0 18.9* 23.3   BUN mg/dL 11 14 19   CREATININE mg/dL 0.74 0.89 0.80   GLUCOSE mg/dL 141* 273* 282*    Estimated Creatinine Clearance: 82.2 mL/min (by C-G formula based on SCr of 0.74 mg/dL).  Results from last 7 days  Lab Units 07/13/18  0756 07/12/18  0935 07/11/18  1356   CALCIUM mg/dL 8.6 9.0 8.4*   ALBUMIN g/dL 3.40* 3.10* 3.40*     Lab Results   Component Value Date    HGBA1C 6.52 (H) 07/12/2018     Glucose   Date/Time Value Ref Range Status   07/13/2018 1712 126 70 - 130 mg/dL Final   07/13/2018 1212 153 (H) 70 - 130 mg/dL Final   07/13/2018 0743 146 (H) 70 - 130 mg/dL Final   07/12/2018 2200 122 70 - 130 mg/dL Final   07/12/2018 1658 125 70 - 130 mg/dL Final   07/12/2018 1218 162 (H) 70 - 130 mg/dL Final         ARIPiprazole 5 mg Oral Daily   donepezil 10 mg Oral BID   FLUoxetine 10 mg Oral Daily   insulin aspart 0-7 Units Subcutaneous 4x Daily With Meals & Nightly   losartan 25 mg Oral Daily   pantoprazole 40 mg Oral Q AM   tiotropium 1 capsule Inhalation Daily      Diet Regular; Consistent Carbohydrate      Assessment/Plan      Active Hospital Problems    Diagnosis Date Noted   • Essential hypertension [I10] 07/12/2018   • Mild intermittent asthma without complication [J45.20] 07/12/2018   • Hypokalemia [E87.6] 07/12/2018   • Leukocytosis [D72.829] 07/12/2018   • Elevated liver enzymes [R74.8] 07/12/2018   • Type 2 diabetes mellitus (CMS/HCC) [E11.9] 07/12/2018   • Affective psychosis, bipolar (CMS/HCC) [F31.9] 07/11/2018      Resolved Hospital Problems    Diagnosis Date Noted Date Resolved   No resolved problems to display.       Elevated liver enzymes: Unclear cause at this point.  Repeat shows consistent elevation of transaminases but they are decreasing slowly.  Probably has JONES.  Acute hep panel negative. CT abd unremarkable.      Type 2 diabetes: Placed on sliding scale insulin.  This is new diagnosis and since she is more stable from a psychiatric standpoint I'll ask diabetic educators to see her.  Change diet to consistent carb.     Hypertension: Continue losartan but holding her  hydrochlorothiazide     Leukocytosis: Resolved, suspect this is a reactive without any fevers     Hypokalemia: Replaced     Mild intermittent asthma: Continue home medications.        Fuentes Ross MD  [facility]  07/13/18  6:02 PM

## 2018-07-13 NOTE — PROGRESS NOTES
Neuropsychology    Met with patient to complete neuropsychological evaluation.  She was pleasant and cooperative.   She demonstrated good effort, but had very slow processing speed.  The woman also showed signs of confusion, especially saying that the year was 2000.  Will evaluate results and report ASAP.    Thank you for the referral!    Teresa Robertson Psy.D.  Licensed Psychologist

## 2018-07-14 LAB
ALBUMIN SERPL-MCNC: 3.4 G/DL (ref 3.5–5.2)
ALBUMIN/GLOB SERPL: 0.9 G/DL
ALP SERPL-CCNC: 77 U/L (ref 39–117)
ALT SERPL W P-5'-P-CCNC: 120 U/L (ref 1–33)
ANION GAP SERPL CALCULATED.3IONS-SCNC: 15 MMOL/L
AST SERPL-CCNC: 136 U/L (ref 1–32)
BILIRUB SERPL-MCNC: 0.9 MG/DL (ref 0.1–1.2)
BUN BLD-MCNC: 12 MG/DL (ref 8–23)
BUN/CREAT SERPL: 16.2 (ref 7–25)
CALCIUM SPEC-SCNC: 8.4 MG/DL (ref 8.6–10.5)
CHLORIDE SERPL-SCNC: 103 MMOL/L (ref 98–107)
CO2 SERPL-SCNC: 22 MMOL/L (ref 22–29)
CREAT BLD-MCNC: 0.74 MG/DL (ref 0.57–1)
GFR SERPL CREATININE-BSD FRML MDRD: 79 ML/MIN/1.73
GLOBULIN UR ELPH-MCNC: 4 GM/DL
GLUCOSE BLD-MCNC: 143 MG/DL (ref 65–99)
GLUCOSE BLDC GLUCOMTR-MCNC: 118 MG/DL (ref 70–130)
GLUCOSE BLDC GLUCOMTR-MCNC: 119 MG/DL (ref 70–130)
GLUCOSE BLDC GLUCOMTR-MCNC: 129 MG/DL (ref 70–130)
GLUCOSE BLDC GLUCOMTR-MCNC: 130 MG/DL (ref 70–130)
POTASSIUM BLD-SCNC: 3.7 MMOL/L (ref 3.5–5.2)
PROT SERPL-MCNC: 7.4 G/DL (ref 6–8.5)
SODIUM BLD-SCNC: 140 MMOL/L (ref 136–145)

## 2018-07-14 PROCEDURE — 94799 UNLISTED PULMONARY SVC/PX: CPT

## 2018-07-14 PROCEDURE — 80053 COMPREHEN METABOLIC PANEL: CPT | Performed by: INTERNAL MEDICINE

## 2018-07-14 PROCEDURE — 82962 GLUCOSE BLOOD TEST: CPT

## 2018-07-14 RX ADMIN — FLUOXETINE HYDROCHLORIDE 10 MG: 10 CAPSULE ORAL at 08:40

## 2018-07-14 RX ADMIN — METFORMIN HYDROCHLORIDE 500 MG: 500 TABLET ORAL at 11:55

## 2018-07-14 RX ADMIN — DONEPEZIL HYDROCHLORIDE 10 MG: 10 TABLET, FILM COATED ORAL at 22:43

## 2018-07-14 RX ADMIN — DONEPEZIL HYDROCHLORIDE 10 MG: 10 TABLET, FILM COATED ORAL at 08:40

## 2018-07-14 RX ADMIN — ARIPIPRAZOLE 5 MG: 5 TABLET ORAL at 08:40

## 2018-07-14 RX ADMIN — TIOTROPIUM BROMIDE 1 CAPSULE: 18 CAPSULE ORAL; RESPIRATORY (INHALATION) at 07:48

## 2018-07-14 RX ADMIN — LOSARTAN POTASSIUM 25 MG: 25 TABLET, FILM COATED ORAL at 11:55

## 2018-07-14 NOTE — PLAN OF CARE
Problem: Patient Care Overview  Goal: Plan of Care Review  Outcome: Ongoing (interventions implemented as appropriate)   07/14/18 0015 07/14/18 0409   OTHER   Outcome Summary --  Pt compliant with medications and care this evening. No episodes of incontinence as of yet. Pt has needed to be encouraged multiple times to use the bathroom, and at times has needed to be coached on the steps while she is in there. Denies SI, HI. Will continue to monitor.    Plan of Care Review   Progress --  no change   Coping/Psychosocial   Plan of Care Reviewed With patient --    Coping/Psychosocial   Patient Agreement with Plan of Care agrees --        Problem: Overarching Goals (Adult)  Goal: Adheres to Safety Considerations for Self and Others  Outcome: Ongoing (interventions implemented as appropriate)   07/14/18 0409   Overarching Goals (Adult)   Adheres to Safety Considerations for Self and Others making progress toward outcome     Intervention: Develop and Maintain Individualized Safety Plan   07/14/18 0000 07/14/18 0015   Violence Risk   Feels Like Hurting Others --  no   Previous Attempt to Harm Others --  no   C-SSRS (Recent)   Wish to be Dead --  no   Suicidal Thoughts --  no   Suicide Behavior --  no   Develop and Maintain Individualized Safety Plan   Safety Measures safety rounds completed --        Goal: Optimized Coping Skills in Response to Life Stressors  Outcome: Ongoing (interventions implemented as appropriate)   07/14/18 0409   Overarching Goals (Adult)   Optimized Coping Skills in Response to Life Stressors making progress toward outcome     Intervention: Promote Effective Coping Strategies   07/14/18 0015   Coping/Psychosocial Interventions   Supportive Measures active listening utilized;verbalization of feelings encouraged       Goal: Develops/Participates in Therapeutic [place] to Support Successful Transition  Outcome: Ongoing (interventions implemented as appropriate)   07/14/18 0409   Overarching Goals  (Adult)   Develops/Participates in Therapeutic [place] to Support Successful Transition making progress toward outcome     Intervention: Foster Therapeutic [place]   07/14/18 0015   Interventions   Trust Relationship/Rapport care explained;thoughts/feelings acknowledged      07/14/18 0015   Interventions   Trust Relationship/Rapport care explained;thoughts/feelings acknowledged     Intervention: Mutually Develop Transition Plan   07/14/18 0015   Mutually Develop Transition Plan   Transition Support crisis management plan promoted         Problem: Depression  Goal: Patient will demonstrate the ability to initiate new constructive life skills outside of sessions on a consistent basis.  Outcome: Ongoing (interventions implemented as appropriate)

## 2018-07-14 NOTE — PLAN OF CARE
Problem: Patient Care Overview  Goal: Plan of Care Review  Outcome: Ongoing (interventions implemented as appropriate)   07/14/18 5323   OTHER   Outcome Summary continue inhaler for optimal breathing   Plan of Care Review   Progress no change   Coping/Psychosocial   Plan of Care Reviewed With patient

## 2018-07-14 NOTE — PROGRESS NOTES
Name: Krupa Mcmanus ADMIT: 2018   : 1955  PCP: No Known Provider    MRN: [id number] LOS: 3 days   AGE/SEX: 62 y.o. female  ROOM: [facility]   Subjective     Feels ok, wants to talk to her  And states he will be there tonight  Mild abd pain, needs to have a BM  No soa or chest pain    Objective   Vital Signs  Temp:  [97.3 °F (36.3 °C)] 97.3 °F (36.3 °C)  Heart Rate:  [61-76] 74  Resp:  [20] 20  BP: ()/(57-73) 112/73  SpO2:  [95 %-96 %] 95 %  on   ;   Device (Oxygen Therapy): room air  There is no height or weight on file to calculate BMI.    Physical Exam  Constitutional: No distress.   Appears somewhat older than stated age   Eyes: Right eye exhibits no discharge. No scleral icterus.   Cardiovascular: Regular rhythm.  Tachycardia present.    No murmur heard.  Pulmonary/Chest: Effort normal and breath sounds normal. No respiratory distress.   Abdominal: Soft. Bowel sounds are normal. She exhibits no distension. There is no tenderness today. There is no rigidity and no guarding.   Musculoskeletal: She exhibits edema (Trace). She exhibits no tenderness.   Neurological: She is alert.   Skin: She is not diaphoretic. No erythema.   Chronic venous stasis changes bilaterally   Psychiatric: She has a normal mood and affect. Her speech is delayed and tangential. She is slowed and withdrawn. She is inattentive.   Vitals reviewed.    Results Review:       I reviewed the patient's new clinical results.    Results from last 7 days  Lab Units 18  0756 18  0935 18  1356   WBC 10*3/mm3 9.40 8.46 11.27*   HEMOGLOBIN g/dL 11.3* 11.5* 12.4   PLATELETS 10*3/mm3 257 231 246       Results from last 7 days  Lab Units 18  0755 18  0756 18  0935 18  1356   SODIUM mmol/L 140 134* 139 135*   POTASSIUM mmol/L 3.7 3.7 3.1* 3.1*   CHLORIDE mmol/L 103 98 100 97*   CO2 mmol/L 22.0 24.0 18.9* 23.3   BUN mg/dL 12 11 14 19   CREATININE mg/dL 0.74 0.74 0.89 0.80   GLUCOSE mg/dL  143* 141* 273* 282*   Estimated Creatinine Clearance: 82.2 mL/min (by C-G formula based on SCr of 0.74 mg/dL).    Results from last 7 days  Lab Units 07/14/18  0755 07/13/18  0756 07/12/18  0935 07/11/18  1356   CALCIUM mg/dL 8.4* 8.6 9.0 8.4*   ALBUMIN g/dL 3.40* 3.40* 3.10* 3.40*     Lab Results   Component Value Date    HGBA1C 6.52 (H) 07/12/2018     Glucose   Date/Time Value Ref Range Status   07/14/2018 1141 130 70 - 130 mg/dL Final   07/14/2018 0731 129 70 - 130 mg/dL Final   07/13/2018 1959 166 (H) 70 - 130 mg/dL Final   07/13/2018 1712 126 70 - 130 mg/dL Final   07/13/2018 1212 153 (H) 70 - 130 mg/dL Final   07/13/2018 0743 146 (H) 70 - 130 mg/dL Final   07/12/2018 2200 122 70 - 130 mg/dL Final   07/12/2018 1658 125 70 - 130 mg/dL Final         ARIPiprazole 5 mg Oral Daily   donepezil 10 mg Oral BID   FLUoxetine 10 mg Oral Daily   losartan 25 mg Oral Daily   metFORMIN 500 mg Oral Daily With Breakfast   pantoprazole 40 mg Oral Q AM   polyethylene glycol 17 g Oral Daily   tiotropium 1 capsule Inhalation Daily      Diet Regular; Consistent Carbohydrate      Assessment/Plan      Active Hospital Problems    Diagnosis Date Noted   • Essential hypertension [I10] 07/12/2018   • Mild intermittent asthma without complication [J45.20] 07/12/2018   • Hypokalemia [E87.6] 07/12/2018   • Leukocytosis [D72.829] 07/12/2018   • Elevated liver enzymes [R74.8] 07/12/2018   • Type 2 diabetes mellitus (CMS/HCC) [E11.9] 07/12/2018   • Affective psychosis, bipolar (CMS/HCC) [F31.9] 07/11/2018      Resolved Hospital Problems    Diagnosis Date Noted Date Resolved   No resolved problems to display.       Elevated liver enzymes: Unclear cause at this point.  Repeat shows consistent elevation of transaminases but they are decreasing slowly.  Probably has JONES.  Acute hep panel negative. CT abd unremarkable.      Type 2 diabetes:We will change sliding scale insulin to metformin, change Accu-Cheks to him the morning only.  Sugars have  been fine.  Diabetic educator consulted.  Continue consistent carb diet.       Hypertension: Continue losartan but holding her hydrochlorothiazide     Leukocytosis: Resolved, suspect this is a reactive without any fevers     Hypokalemia: Replaced     Mild intermittent asthma: Continue home medications.    I will sign off but will be available if there are any questions or concerns.    Fuentes Ross MD  [facility]  07/14/18  3:16 PM

## 2018-07-14 NOTE — PROGRESS NOTES
The patient remains quite needy and in need of frequent redirection.  She is compliant with medications and lee routine and is reporting no suicidal or homicidal elation but again requires a great deal of encouragement to complete even the simplest of activities of daily living.

## 2018-07-15 LAB
GLUCOSE BLDC GLUCOMTR-MCNC: 105 MG/DL (ref 70–130)
GLUCOSE BLDC GLUCOMTR-MCNC: 113 MG/DL (ref 70–130)
GLUCOSE BLDC GLUCOMTR-MCNC: 139 MG/DL (ref 70–130)
GLUCOSE BLDC GLUCOMTR-MCNC: 170 MG/DL (ref 70–130)

## 2018-07-15 PROCEDURE — 82962 GLUCOSE BLOOD TEST: CPT

## 2018-07-15 PROCEDURE — 94799 UNLISTED PULMONARY SVC/PX: CPT

## 2018-07-15 RX ADMIN — TIOTROPIUM BROMIDE 1 CAPSULE: 18 CAPSULE ORAL; RESPIRATORY (INHALATION) at 08:00

## 2018-07-15 RX ADMIN — DONEPEZIL HYDROCHLORIDE 10 MG: 10 TABLET, FILM COATED ORAL at 21:54

## 2018-07-15 RX ADMIN — POLYETHYLENE GLYCOL 3350 17 G: 17 POWDER, FOR SOLUTION ORAL at 08:47

## 2018-07-15 RX ADMIN — METFORMIN HYDROCHLORIDE 500 MG: 500 TABLET ORAL at 08:45

## 2018-07-15 RX ADMIN — PANTOPRAZOLE SODIUM 40 MG: 40 TABLET, DELAYED RELEASE ORAL at 08:45

## 2018-07-15 RX ADMIN — LOSARTAN POTASSIUM 25 MG: 25 TABLET, FILM COATED ORAL at 08:45

## 2018-07-15 RX ADMIN — FLUOXETINE HYDROCHLORIDE 10 MG: 10 CAPSULE ORAL at 08:45

## 2018-07-15 RX ADMIN — DONEPEZIL HYDROCHLORIDE 10 MG: 10 TABLET, FILM COATED ORAL at 08:45

## 2018-07-15 RX ADMIN — ARIPIPRAZOLE 5 MG: 5 TABLET ORAL at 08:45

## 2018-07-15 NOTE — PLAN OF CARE
Problem: Patient Care Overview  Goal: Plan of Care Review  Outcome: Ongoing (interventions implemented as appropriate)   07/15/18 1812   OTHER   Outcome Summary PT is cooperative and compliant with treatments and medications. Denies any mental issues. Affect is flat appears foggy. Will continue to monitor behavior and provide support.    Plan of Care Review   Progress no change   Coping/Psychosocial   Plan of Care Reviewed With patient   Coping/Psychosocial   Patient Agreement with Plan of Care agrees     Goal: Individualization and Mutuality  Outcome: Ongoing (interventions implemented as appropriate)   07/15/18 1812   Personal Strengths/Vulnerabilities   Patient Personal Strengths expressive of needs;medication/treatment adherence;motivated for treatment      07/15/18 1812   Personal Strengths/Vulnerabilities   Patient Personal Strengths expressive of needs;medication/treatment adherence;motivated for treatment     Goal: Discharge Needs Assessment  Outcome: Ongoing (interventions implemented as appropriate)    Goal: Interprofessional Rounds/Family Conf  Outcome: Ongoing (interventions implemented as appropriate)      Problem: Overarching Goals (Adult)  Goal: Adheres to Safety Considerations for Self and Others  Outcome: Ongoing (interventions implemented as appropriate)    Goal: Optimized Coping Skills in Response to Life Stressors  Outcome: Ongoing (interventions implemented as appropriate)    Goal: Develops/Participates in Therapeutic [place] to Support Successful Transition  Outcome: Ongoing (interventions implemented as appropriate)

## 2018-07-15 NOTE — PLAN OF CARE
Problem: Patient Care Overview  Goal: Plan of Care Review  Outcome: Ongoing (interventions implemented as appropriate)   07/15/18 0026 07/15/18 0435   OTHER   Outcome Summary --  Pt appears confused but denies anxiety and depression. Pt denies SI, HI, hallucinations and pain. Will continue to monitor mood and behavior.   Plan of Care Review   Progress --  no change   Coping/Psychosocial   Plan of Care Reviewed With patient --    Coping/Psychosocial   Patient Agreement with Plan of Care agrees --        Problem: Overarching Goals (Adult)  Goal: Adheres to Safety Considerations for Self and Others  Outcome: Ongoing (interventions implemented as appropriate)    Goal: Optimized Coping Skills in Response to Life Stressors  Outcome: Ongoing (interventions implemented as appropriate)    Goal: Develops/Participates in Therapeutic [place] to Support Successful Transition  Outcome: Ongoing (interventions implemented as appropriate)      Problem: Suicidal Behavior (Adult)  Goal: Suicidal Behavior is Absent/Minimized/Managed  Outcome: Outcome(s) achieved Date Met: 07/15/18

## 2018-07-15 NOTE — PLAN OF CARE
Problem: Patient Care Overview  Goal: Plan of Care Review  Outcome: Ongoing (interventions implemented as appropriate)   07/14/18 2107   OTHER   Outcome Summary PT denies any mental health issues. Denies SI/HI. Appears confused and sometimes in a stupor, but became very clear coherent when speaking to her  at nursing station on phone. Incont of stool, shower given. Compliant with medications. Will continue to monitor behavior for safety and provide support.   Plan of Care Review   Progress no change   Coping/Psychosocial   Plan of Care Reviewed With patient   Coping/Psychosocial   Patient Agreement with Plan of Care agrees     Goal: Individualization and Mutuality  Outcome: Ongoing (interventions implemented as appropriate)   07/14/18 2107   Personal Strengths/Vulnerabilities   Patient Personal Strengths medication/treatment adherence;motivated for treatment;expressive of needs     Goal: Discharge Needs Assessment  Outcome: Ongoing (interventions implemented as appropriate)    Goal: Interprofessional Rounds/Family Conf  Outcome: Ongoing (interventions implemented as appropriate)

## 2018-07-15 NOTE — PROGRESS NOTES
The patient seems improved today.  She is dressed and groomed and is active within the therapeutic milieu though she continues to seem somewhat befuddled with activities of daily living simple tasks such as using the phone etc.

## 2018-07-16 LAB — GLUCOSE BLDC GLUCOMTR-MCNC: 139 MG/DL (ref 70–130)

## 2018-07-16 PROCEDURE — 82962 GLUCOSE BLOOD TEST: CPT

## 2018-07-16 PROCEDURE — 94799 UNLISTED PULMONARY SVC/PX: CPT

## 2018-07-16 RX ADMIN — LOSARTAN POTASSIUM 25 MG: 25 TABLET, FILM COATED ORAL at 08:02

## 2018-07-16 RX ADMIN — PANTOPRAZOLE SODIUM 40 MG: 40 TABLET, DELAYED RELEASE ORAL at 07:48

## 2018-07-16 RX ADMIN — METFORMIN HYDROCHLORIDE 500 MG: 500 TABLET ORAL at 07:49

## 2018-07-16 RX ADMIN — DONEPEZIL HYDROCHLORIDE 10 MG: 10 TABLET, FILM COATED ORAL at 08:02

## 2018-07-16 RX ADMIN — ARIPIPRAZOLE 5 MG: 5 TABLET ORAL at 08:03

## 2018-07-16 RX ADMIN — FLUOXETINE HYDROCHLORIDE 10 MG: 10 CAPSULE ORAL at 08:02

## 2018-07-16 RX ADMIN — TIOTROPIUM BROMIDE 1 CAPSULE: 18 CAPSULE ORAL; RESPIRATORY (INHALATION) at 06:58

## 2018-07-16 RX ADMIN — DONEPEZIL HYDROCHLORIDE 10 MG: 10 TABLET, FILM COATED ORAL at 20:42

## 2018-07-16 NOTE — PLAN OF CARE
Problem: Patient Care Overview  Goal: Plan of Care Review  Outcome: Ongoing (interventions implemented as appropriate)   07/15/18 2359 07/16/18 0518   OTHER   Outcome Summary --  Pt has been cooperative with medication this shift. She denies all mental health issues. Pt appears confused at times. Will continue to monitor.   Plan of Care Review   Progress --  improving   Coping/Psychosocial   Plan of Care Reviewed With patient --    Coping/Psychosocial   Patient Agreement with Plan of Care agrees --        Problem: Overarching Goals (Adult)  Goal: Adheres to Safety Considerations for Self and Others  Outcome: Ongoing (interventions implemented as appropriate)    Goal: Optimized Coping Skills in Response to Life Stressors  Outcome: Ongoing (interventions implemented as appropriate)    Goal: Develops/Participates in Therapeutic [place] to Support Successful Transition  Outcome: Ongoing (interventions implemented as appropriate)

## 2018-07-16 NOTE — PLAN OF CARE
Problem: Patient Care Overview  Goal: Plan of Care Review  Outcome: Ongoing (interventions implemented as appropriate)   07/16/18 9603   OTHER   Outcome Summary PT WAS COMPLIANT WITH HER MEDICATIONS THIS SHIFT. SHE VOICED NO SI/HI. ANXIETY AND DEPRESSION OF 10 WAS REPORTED. PT HAS BEEN COOPERATIVE WITH TX THIS SHIFT. SHE IS ALERT AND ORIENTED X3. ATTENDED ALL GROUPS. FLAT AFFECT NOT THIS SHIFT. SHE IS AN ASSIST X1 WITH ALL ADL'S. NO FALLS NOTED THIS SHIFT. WILL CONTINUE TO MONITOR PATIENT AND NOTE ANY CHANGES.   Plan of Care Review   Progress no change   Coping/Psychosocial   Plan of Care Reviewed With patient   Coping/Psychosocial   Patient Agreement with Plan of Care agrees

## 2018-07-16 NOTE — PLAN OF CARE
Problem: Patient Care Overview  Goal: Interprofessional Rounds/Family Conf  Outcome: Ongoing (interventions implemented as appropriate)   07/16/18 1028   Interdisciplinary Rounds/Family Conf   Summary Treatment team met to review plan of care. Home health is recommended for pt as part of discharge plan. SW will continue to assess pt's discharge needs on an ongoing basis.    Interdisciplinary Rounds/Family Conf   Participants art therapy;;psychiatrist;nursing;pharmacy;social work     Patient/Guardian Signature: __________________________________            Psychiatrist Signature: ______________________________________             Therapist Signature: ________________________________________         Nurse Signature: ___________________________________________          Staff Signature: ____________________________________________            Staff Signature: ____________________________________________          Staff Signature: ____________________________________________          Staff Signature:                                                                                                      Problem: Depression  Goal: Patient will demonstrate the ability to initiate new constructive life skills outside of sessions on a consistent basis.  Outcome: Ongoing (interventions implemented as appropriate)

## 2018-07-16 NOTE — PROGRESS NOTES
The patient continues to appear befuddled with simple activities of daily living.  She has once again wet herself today.  At this point, I question how much more improvement we can expect with this patient and would like for there to be a family therapy session held.  The patient looks to be a good candidate for possible home health follow-up.

## 2018-07-16 NOTE — PROGRESS NOTES
Continued Stay Note  [facility]     Patient Name: Krupa Mcmanus  MRN: [id number]  Today's Date: 7/16/2018    Admit Date: 7/11/2018          Discharge Plan     Row Name 07/16/18 1048       Plan    Plan Comments JESENIA spoke with pt's spouse, Jose Mcmanus [phone], to review discharge planning. SW briefly discussed pt's treatment progress and recommendations for home health following pt's D/C. Pt's spouse agrees with this plan and will continue to collaborate for appropriate after-care.   **JESENIA spoke with PRINCE Joe nurse liaison, regarding pt's after-care with home health. JESENIA will contact Geni at time of discharge for continuum of care.               Discharge Codes    No documentation.           Brea Bruce LCSW

## 2018-07-17 LAB
GLUCOSE BLDC GLUCOMTR-MCNC: 107 MG/DL (ref 70–130)
GLUCOSE BLDC GLUCOMTR-MCNC: 181 MG/DL (ref 70–130)

## 2018-07-17 PROCEDURE — 94799 UNLISTED PULMONARY SVC/PX: CPT

## 2018-07-17 PROCEDURE — 82962 GLUCOSE BLOOD TEST: CPT

## 2018-07-17 RX ADMIN — DONEPEZIL HYDROCHLORIDE 10 MG: 10 TABLET, FILM COATED ORAL at 21:27

## 2018-07-17 RX ADMIN — PANTOPRAZOLE SODIUM 40 MG: 40 TABLET, DELAYED RELEASE ORAL at 08:57

## 2018-07-17 RX ADMIN — TIOTROPIUM BROMIDE 1 CAPSULE: 18 CAPSULE ORAL; RESPIRATORY (INHALATION) at 06:59

## 2018-07-17 RX ADMIN — ARIPIPRAZOLE 5 MG: 5 TABLET ORAL at 08:57

## 2018-07-17 RX ADMIN — FLUOXETINE HYDROCHLORIDE 10 MG: 10 CAPSULE ORAL at 08:57

## 2018-07-17 RX ADMIN — LOSARTAN POTASSIUM 25 MG: 25 TABLET, FILM COATED ORAL at 08:57

## 2018-07-17 RX ADMIN — DONEPEZIL HYDROCHLORIDE 10 MG: 10 TABLET, FILM COATED ORAL at 08:57

## 2018-07-17 RX ADMIN — METFORMIN HYDROCHLORIDE 500 MG: 500 TABLET ORAL at 08:57

## 2018-07-17 NOTE — PROGRESS NOTES
Neuropsychological evaluation indicates the presence of a vascular dementia.  Further, it indicates the patient will be in need of 24-hour care.  The patient remains helplessness intermittently confused.  She was incontinent of urine last evening.  A family therapy session scheduled for later today.

## 2018-07-17 NOTE — SIGNIFICANT NOTE
Met with pt and brother Rosales for family session [id number]. Pt's  was invited to the meeting and called sharing that had digestion issues and could not come so called pt's brother to attend the meeting.  declined the offer to reschedule the session. Rosales asked questions about meds, newer DX of Diabetes, follow-up care options and asked if the MRI from Dr Blackman at [facility] was seen. Pt had poor eye contact and did not take part in the conversations even when encouraged. Nurse Percy joined the session to explain information related to DX and meds.  Brea bunch would call family with information about home health options and out pt socialization groups. Rosales shared pt looked better and believes the schedule of classes is helping. This therapist encouraged care for self, eating well, exercise and socialization. Pt did share how had liked to walk in the past. Support was give to begin this again.

## 2018-07-17 NOTE — PLAN OF CARE
Problem: Patient Care Overview  Goal: Plan of Care Review  Outcome: Ongoing (interventions implemented as appropriate)   07/17/18 0631   OTHER   Outcome Summary Pt remains helpless and intermittently confused. Pt was incontinent of urine last night, and when attempting to clean her up, she became very frustrated with simple commands such as wipe yourself, or go sit on the toilet and remove soiled clothing. Pt refused to remove soiled tshirt. Compliant with medications. Denies SI, HI. Will continue to monitor.      Plan of Care Review   Progress no change   Coping/Psychosocial   Plan of Care Reviewed With patient   Coping/Psychosocial   Patient Agreement with Plan of Care agrees       Problem: Overarching Goals (Adult)  Goal: Adheres to Safety Considerations for Self and Others  Outcome: Ongoing (interventions implemented as appropriate)   07/17/18 0631   Overarching Goals (Adult)   Adheres to Safety Considerations for Self and Others making progress toward outcome     Intervention: Develop and Maintain Individualized Safety Plan   07/16/18 2300 07/17/18 0200   Violence Risk   Feels Like Hurting Others no --    Previous Attempt to Harm Others no --    C-SSRS (Recent)   Wish to be Dead no --    Suicidal Thoughts no --    Suicide Behavior no --    Develop and Maintain Individualized Safety Plan   Safety Measures --  safety rounds completed       Goal: Optimized Coping Skills in Response to Life Stressors  Outcome: Ongoing (interventions implemented as appropriate)   07/17/18 0631   Overarching Goals (Adult)   Optimized Coping Skills in Response to Life Stressors making progress toward outcome     Intervention: Promote Effective Coping Strategies   07/16/18 2300   Coping/Psychosocial Interventions   Supportive Measures active listening utilized;verbalization of feelings encouraged       Goal: Develops/Participates in Therapeutic [place] to Support Successful Transition  Outcome: Ongoing (interventions implemented as  appropriate)   07/17/18 0631   Overarching Goals (Adult)   Develops/Participates in Therapeutic [place] to Support Successful Transition making progress toward outcome     Intervention: Foster Therapeutic [place]   07/16/18 2300   Interventions   Trust Relationship/Rapport care explained;thoughts/feelings acknowledged     Intervention: Mutually Develop Transition Plan   07/16/18 2300   Mutually Develop Transition Plan   Transition Support crisis management plan promoted

## 2018-07-17 NOTE — PROGRESS NOTES
Continued Stay Note  [facility]     Patient Name: Krupa Mcmanus  MRN: [id number]  Today's Date: 7/17/2018    Admit Date: 7/11/2018          Discharge Plan     Row Name 07/17/18 1111       Plan    Plan Comments SW received call from pt's spouse, Jose Mcmanus [phone], to inform that pt is only open to him being in her session rather than her brother. The pt's spouse expressed his frustration with the pt because he is unable to attend the session today, due to health issues at this time. He was adamant that the brother was competent to attend the family session with the pt. SW explained that it was the pt's decision about who she would like to involve in her family session and if pt continues to decline her brother's involvement in her family session, then the treatment team would have to respect this decision. Pt's spouse continued to express his frustration towards the pt's decision and stated that he would call [facility] to speak with her about this decision. JESENIA relayed this information to Daniel Hodges, U art therapist, who is conducting the family session.               Discharge Codes    No documentation.           Brea Bruce LCSW

## 2018-07-18 LAB — GLUCOSE BLDC GLUCOMTR-MCNC: 182 MG/DL (ref 70–130)

## 2018-07-18 PROCEDURE — 94799 UNLISTED PULMONARY SVC/PX: CPT

## 2018-07-18 PROCEDURE — 82962 GLUCOSE BLOOD TEST: CPT

## 2018-07-18 RX ADMIN — DONEPEZIL HYDROCHLORIDE 10 MG: 10 TABLET, FILM COATED ORAL at 08:59

## 2018-07-18 RX ADMIN — ARIPIPRAZOLE 5 MG: 5 TABLET ORAL at 08:59

## 2018-07-18 RX ADMIN — FLUOXETINE HYDROCHLORIDE 10 MG: 10 CAPSULE ORAL at 08:59

## 2018-07-18 RX ADMIN — DONEPEZIL HYDROCHLORIDE 10 MG: 10 TABLET, FILM COATED ORAL at 21:46

## 2018-07-18 RX ADMIN — TIOTROPIUM BROMIDE 1 CAPSULE: 18 CAPSULE ORAL; RESPIRATORY (INHALATION) at 07:49

## 2018-07-18 RX ADMIN — LOSARTAN POTASSIUM 25 MG: 25 TABLET, FILM COATED ORAL at 08:58

## 2018-07-18 RX ADMIN — METFORMIN HYDROCHLORIDE 500 MG: 500 TABLET ORAL at 08:58

## 2018-07-18 NOTE — PROGRESS NOTES
"Continued Stay Note  [facility]     Patient Name: Krupa Mcmanus  MRN: [id number]  Today's Date: 7/18/2018    Admit Date: 7/11/2018          Discharge Plan     Row Name 07/18/18 0833       Plan    Plan Comments JESENIA spoke with pt's brother, Rosales [phone], to review discharge planning. Pt's brother asked several questions related to home health services provided for pt. JESENIA also informed pt's brother that pt may be discharged today and also explained that 24-7 care was recommended following neuropsych testing results, which indicated vascular dementia. Pt's brother stated that pt's spouse is able to provide 24-7 care, but in the case that pt may need long-term care in the future, he would like some resources. JESENIA will provide pt's family with \"Today's transitions\" resource booklet, which highlights various levels of care.               Discharge Codes    No documentation.           Brea Bruce LCSW    "

## 2018-07-18 NOTE — PLAN OF CARE
Problem: Patient Care Overview  Goal: Plan of Care Review  Outcome: Ongoing (interventions implemented as appropriate)   07/18/18 1310 07/18/18 1755   OTHER   Outcome Summary --  Pt remains confused to situation only. Voiced anxiety 10/10. Pt tearful upon assessment. Denied depression, SI/HI, and A/VH. Pt cooperative with care and med compliant. Pt incontinent at times this shift. No other c/o at this time. Will continue to monitor and provide safe environment.    Plan of Care Review   Progress --  no change   Coping/Psychosocial   Plan of Care Reviewed With patient --    Coping/Psychosocial   Patient Agreement with Plan of Care agrees --        Problem: Overarching Goals (Adult)  Goal: Adheres to Safety Considerations for Self and Others  Outcome: Ongoing (interventions implemented as appropriate)   07/18/18 1755   Overarching Goals (Adult)   Adheres to Safety Considerations for Self and Others making progress toward outcome     Intervention: Develop and Maintain Individualized Safety Plan   07/18/18 1310 07/18/18 1600   Violence Risk   Feels Like Hurting Others no --    Previous Attempt to Harm Others no --    C-SSRS (Recent)   Wish to be Dead no --    Suicidal Thoughts no --    Suicide Behavior no --    Develop and Maintain Individualized Safety Plan   Safety Measures --  safety rounds completed       Goal: Optimized Coping Skills in Response to Life Stressors  Outcome: Ongoing (interventions implemented as appropriate)   07/18/18 1755   Overarching Goals (Adult)   Optimized Coping Skills in Response to Life Stressors making progress toward outcome     Intervention: Promote Effective Coping Strategies   07/18/18 1310   Coping/Psychosocial Interventions   Supportive Measures active listening utilized;verbalization of feelings encouraged;self-care encouraged       Goal: Develops/Participates in Therapeutic [place] to Support Successful Transition  Outcome: Ongoing (interventions implemented as appropriate)    07/18/18 1755   Overarching Goals (Adult)   Develops/Participates in Therapeutic [place] to Support Successful Transition making progress toward outcome     Intervention: Foster Therapeutic [place]   07/18/18 1310   Interventions   Trust Relationship/Rapport care explained;choices provided;thoughts/feelings acknowledged;reassurance provided;questions answered     Intervention: Mutually Develop Transition Plan   07/18/18 1310   Mutually Develop Transition Plan   Transition Support crisis management plan promoted         Problem: Depression  Goal: Patient will demonstrate the ability to initiate new constructive life skills outside of sessions on a consistent basis.  Outcome: Ongoing (interventions implemented as appropriate)

## 2018-07-18 NOTE — PLAN OF CARE
Problem: Patient Care Overview  Goal: Plan of Care Review  Outcome: Ongoing (interventions implemented as appropriate)   07/18/18 0145 07/18/18 0551   OTHER   Outcome Summary --  Pt cooperative with assessment and medications this evening. Continues with response lag and confusion. Incontinent of both urine and stool this evening. Initially refused to get on toilet and change, but with much encouragement was able to sit on toilet. Denies SI, HI. Did not rate anxiety and depression. Currently sleeping peacefully. Will continue to monitor.    Plan of Care Review   Progress --  no change   Coping/Psychosocial   Plan of Care Reviewed With patient --    Coping/Psychosocial   Patient Agreement with Plan of Care agrees --        Problem: Overarching Goals (Adult)  Goal: Adheres to Safety Considerations for Self and Others  Outcome: Ongoing (interventions implemented as appropriate)   07/18/18 0551   Overarching Goals (Adult)   Adheres to Safety Considerations for Self and Others making progress toward outcome     Intervention: Develop and Maintain Individualized Safety Plan   07/18/18 0145 07/18/18 0400   Violence Risk   Feels Like Hurting Others no --    Previous Attempt to Harm Others no --    C-SSRS (Recent)   Wish to be Dead no --    Suicidal Thoughts no --    Suicide Behavior no --    Develop and Maintain Individualized Safety Plan   Safety Measures --  safety rounds completed       Goal: Optimized Coping Skills in Response to Life Stressors  Outcome: Ongoing (interventions implemented as appropriate)   07/18/18 0551   Overarching Goals (Adult)   Optimized Coping Skills in Response to Life Stressors making progress toward outcome     Intervention: Promote Effective Coping Strategies   07/18/18 0145   Coping/Psychosocial Interventions   Supportive Measures active listening utilized;verbalization of feelings encouraged       Goal: Develops/Participates in Therapeutic [place] to Support Successful Transition  Outcome:  Ongoing (interventions implemented as appropriate)   07/18/18 0551   Overarching Goals (Adult)   Develops/Participates in Therapeutic [place] to Support Successful Transition making progress toward outcome     Intervention: Foster Therapeutic [place]   07/18/18 0145   Interventions   Trust Relationship/Rapport care explained;thoughts/feelings acknowledged     Intervention: Mutually Develop Transition Plan   07/18/18 0145   Mutually Develop Transition Plan   Transition Support crisis management plan promoted

## 2018-07-18 NOTE — PLAN OF CARE
Problem: Patient Care Overview  Goal: Plan of Care Review  Outcome: Ongoing (interventions implemented as appropriate)   07/17/18 2015   OTHER   Outcome Summary upport. PT denies any mental health issues. Denies pain. Remains mentally preoccupied and with some confusion. Spoke with PT's brother during family session, answered questions and gave printed  information on diabetes and medications to him. Will continue to monitor behavior and provide support.    Plan of Care Review   Progress no change   Coping/Psychosocial   Plan of Care Reviewed With patient;sibling   Coping/Psychosocial   Patient Agreement with Plan of Care unable to participate     Goal: Individualization and Mutuality  Outcome: Ongoing (interventions implemented as appropriate)    Goal: Discharge Needs Assessment  Outcome: Ongoing (interventions implemented as appropriate)    Goal: Interprofessional Rounds/Family Conf  Outcome: Ongoing (interventions implemented as appropriate)      Problem: Overarching Goals (Adult)  Goal: Adheres to Safety Considerations for Self and Others  Outcome: Ongoing (interventions implemented as appropriate)    Goal: Optimized Coping Skills in Response to Life Stressors  Outcome: Ongoing (interventions implemented as appropriate)    Goal: Develops/Participates in Therapeutic [place] to Support Successful Transition  Outcome: Ongoing (interventions implemented as appropriate)

## 2018-07-18 NOTE — PROGRESS NOTES
Family now aware of the patient's diagnosis of vascular dementia as well as the for 24/7 care.  The patient looks essentially the same.  She is a bit more tendon to activities of daily living but continues to require frequent redirection with regards to even the simplest tasks.  I believe that she may be at or near her maximum level of improvement and we're looking towards discharge in the next few days.

## 2018-07-19 LAB — GLUCOSE BLDC GLUCOMTR-MCNC: 128 MG/DL (ref 70–130)

## 2018-07-19 PROCEDURE — 82962 GLUCOSE BLOOD TEST: CPT

## 2018-07-19 PROCEDURE — 94799 UNLISTED PULMONARY SVC/PX: CPT

## 2018-07-19 RX ADMIN — TIOTROPIUM BROMIDE 1 CAPSULE: 18 CAPSULE ORAL; RESPIRATORY (INHALATION) at 07:29

## 2018-07-19 RX ADMIN — PANTOPRAZOLE SODIUM 40 MG: 40 TABLET, DELAYED RELEASE ORAL at 08:44

## 2018-07-19 RX ADMIN — ALUMINUM HYDROXIDE, MAGNESIUM HYDROXIDE, AND DIMETHICONE 15 ML: 400; 400; 40 SUSPENSION ORAL at 17:32

## 2018-07-19 RX ADMIN — DONEPEZIL HYDROCHLORIDE 10 MG: 10 TABLET, FILM COATED ORAL at 08:39

## 2018-07-19 RX ADMIN — FLUOXETINE HYDROCHLORIDE 10 MG: 10 CAPSULE ORAL at 08:39

## 2018-07-19 RX ADMIN — LOSARTAN POTASSIUM 25 MG: 25 TABLET, FILM COATED ORAL at 08:39

## 2018-07-19 RX ADMIN — DONEPEZIL HYDROCHLORIDE 10 MG: 10 TABLET, FILM COATED ORAL at 22:55

## 2018-07-19 RX ADMIN — METFORMIN HYDROCHLORIDE 500 MG: 500 TABLET ORAL at 08:39

## 2018-07-19 RX ADMIN — ARIPIPRAZOLE 5 MG: 5 TABLET ORAL at 08:39

## 2018-07-19 NOTE — PROGRESS NOTES
It is my suspicion that the patient's probably reached her maximum level of improvement in the hospital.  She continues to require assistance with ADLs and frequent redirection.  Family has been made aware of the results of neuropsychological testing, specifically the need for 24/ 7 care.  We are planning on a.m. discharge.  An order for home health.

## 2018-07-19 NOTE — PLAN OF CARE
Problem: Patient Care Overview  Goal: Plan of Care Review  Outcome: Ongoing (interventions implemented as appropriate)   07/19/18 0000 07/19/18 0603   OTHER   Outcome Summary --  Pt remains confused with significant response lag. No episodes of incontinence as of yet. Compliant with scheduled medications. Denies SI, HI. Will continue to monitor.    Plan of Care Review   Progress --  no change   Coping/Psychosocial   Plan of Care Reviewed With patient --    Coping/Psychosocial   Patient Agreement with Plan of Care agrees --        Problem: Overarching Goals (Adult)  Goal: Adheres to Safety Considerations for Self and Others  Outcome: Ongoing (interventions implemented as appropriate)   07/19/18 0603   Overarching Goals (Adult)   Adheres to Safety Considerations for Self and Others making progress toward outcome     Intervention: Develop and Maintain Individualized Safety Plan   07/19/18 0000 07/19/18 0100   Violence Risk   Feels Like Hurting Others no --    Previous Attempt to Harm Others no --    C-SSRS (Recent)   Wish to be Dead no --    Suicidal Thoughts no --    Suicide Behavior no --    Develop and Maintain Individualized Safety Plan   Safety Measures --  safety rounds completed       Goal: Optimized Coping Skills in Response to Life Stressors  Outcome: Ongoing (interventions implemented as appropriate)   07/19/18 0603   Overarching Goals (Adult)   Optimized Coping Skills in Response to Life Stressors making progress toward outcome     Intervention: Promote Effective Coping Strategies   07/19/18 0000   Coping/Psychosocial Interventions   Supportive Measures active listening utilized;verbalization of feelings encouraged       Goal: Develops/Participates in Therapeutic [place] to Support Successful Transition  Outcome: Ongoing (interventions implemented as appropriate)   07/19/18 0603   Overarching Goals (Adult)   Develops/Participates in Therapeutic [place] to Support Successful Transition making progress  toward outcome     Intervention: Foster Therapeutic [place]   07/19/18 0000   Interventions   Trust Relationship/Rapport care explained;thoughts/feelings acknowledged     Intervention: Mutually Develop Transition Plan   07/19/18 0000   Mutually Develop Transition Plan   Transition Support crisis management plan promoted         Problem: Depression  Goal: Patient will demonstrate the ability to initiate new constructive life skills outside of sessions on a consistent basis.  Outcome: Ongoing (interventions implemented as appropriate)      Problem: Skin Injury Risk (Adult)  Goal: Identify Related Risk Factors and Signs and Symptoms  Outcome: Ongoing (interventions implemented as appropriate)   07/19/18 0603   Skin Injury Risk (Adult)   Related Risk Factors (Skin Injury Risk) cognitive impairment;moisture     Goal: Skin Health and Integrity  Outcome: Ongoing (interventions implemented as appropriate)   07/19/18 0603   Skin Injury Risk (Adult)   Skin Health and Integrity making progress toward outcome

## 2018-07-20 VITALS
SYSTOLIC BLOOD PRESSURE: 120 MMHG | HEART RATE: 75 BPM | RESPIRATION RATE: 18 BRPM | DIASTOLIC BLOOD PRESSURE: 69 MMHG | TEMPERATURE: 97.4 F | OXYGEN SATURATION: 94 %

## 2018-07-20 LAB — GLUCOSE BLDC GLUCOMTR-MCNC: 140 MG/DL (ref 70–130)

## 2018-07-20 PROCEDURE — 82962 GLUCOSE BLOOD TEST: CPT

## 2018-07-20 PROCEDURE — 94799 UNLISTED PULMONARY SVC/PX: CPT

## 2018-07-20 RX ORDER — DONEPEZIL HYDROCHLORIDE 10 MG/1
10 TABLET, FILM COATED ORAL NIGHTLY
Qty: 30 TABLET | Refills: 1 | Status: ON HOLD | OUTPATIENT
Start: 2018-07-20 | End: 2022-08-19

## 2018-07-20 RX ORDER — FLUOXETINE 10 MG/1
10 CAPSULE ORAL DAILY
Qty: 30 CAPSULE | Refills: 1 | Status: ON HOLD | OUTPATIENT
Start: 2018-07-21 | End: 2023-03-07

## 2018-07-20 RX ORDER — ARIPIPRAZOLE 5 MG/1
5 TABLET ORAL DAILY
Qty: 30 TABLET | Refills: 1 | Status: ON HOLD | OUTPATIENT
Start: 2018-07-21 | End: 2022-08-19

## 2018-07-20 RX ADMIN — PANTOPRAZOLE SODIUM 40 MG: 40 TABLET, DELAYED RELEASE ORAL at 08:06

## 2018-07-20 RX ADMIN — TIOTROPIUM BROMIDE 1 CAPSULE: 18 CAPSULE ORAL; RESPIRATORY (INHALATION) at 07:56

## 2018-07-20 RX ADMIN — METFORMIN HYDROCHLORIDE 500 MG: 500 TABLET ORAL at 08:06

## 2018-07-20 RX ADMIN — ARIPIPRAZOLE 5 MG: 5 TABLET ORAL at 08:06

## 2018-07-20 RX ADMIN — ALUMINUM HYDROXIDE, MAGNESIUM HYDROXIDE, AND DIMETHICONE 15 ML: 400; 400; 40 SUSPENSION ORAL at 06:36

## 2018-07-20 RX ADMIN — FLUOXETINE HYDROCHLORIDE 10 MG: 10 CAPSULE ORAL at 08:06

## 2018-07-20 RX ADMIN — DONEPEZIL HYDROCHLORIDE 10 MG: 10 TABLET, FILM COATED ORAL at 08:06

## 2018-07-20 RX ADMIN — LOSARTAN POTASSIUM 25 MG: 25 TABLET, FILM COATED ORAL at 08:06

## 2018-07-20 NOTE — PLAN OF CARE
Problem: Patient Care Overview  Goal: Plan of Care Review  Outcome: Ongoing (interventions implemented as appropriate)   07/19/18 2046   OTHER   Outcome Summary PT is compliant with medications and cooperative attending groups. Denies all mental health issues. Remains confused and incont of B and B. Dontae continue monitor behavior and provide support.   Plan of Care Review   Progress no change   Coping/Psychosocial   Plan of Care Reviewed With patient   Coping/Psychosocial   Patient Agreement with Plan of Care agrees     Goal: Individualization and Mutuality  Outcome: Ongoing (interventions implemented as appropriate)   07/19/18 2046   Personal Strengths/Vulnerabilities   Patient Personal Strengths family/social support;medication/treatment adherence;expressive of needs     Goal: Discharge Needs Assessment  Outcome: Ongoing (interventions implemented as appropriate)    Goal: Interprofessional Rounds/Family Conf  Outcome: Ongoing (interventions implemented as appropriate)

## 2018-07-20 NOTE — DISCHARGE SUMMARY
DATES OF ADMISSION: 7/11/2018-7/20/2018    REASON FOR ADMISSION: The patient is a 62-year-old white female with a history of dementia and depression admitted with lack of attendance to ADLs and depressed mood    LABS:  See chart    HOSPITAL COURSE:  Patient was admitted to the crisis management unit and placed on suicide precautions.  These discontinued if the patient was able to promise safety in the hospital.  She was continued on previously prescribed Abilify and Prozac 10 mg daily was added to address depressive symptoms.  Patient was continued on Aricept with dosage increased to 10 mg at bedtime.  The patient continued to exhibit confusion and difficulty with completion of activities of daily living.  She frequently required redirection and was frequently incontinent of stool and bladder.  She did show some improvement as her stay in the hospital progressed with structure and encouragement from staff.  For this reason, it was the feeling of this physician and staff that home health follow-up will be essential for the patient following discharge.  On 720 the patient had reached what was felt to be maximum benefit from hospitalization.  Neuropsychological evaluation and indicated the presence of depression and vascular dementia.  Discharge was ordered.    FINAL DIAGNOSIS:  Vascular dementia, major depressive disorder recurrent moderate,DM, GERD    DISPOSITION ON DISCHARGE:  A full sting of the patient's medications is provided below.  Follow-up will take place of the auspices of community mental health resources with home health and also being involved in the patient's aftercare.    PROGNOSIS: Guarded       Discharge Medications      New Medications      Instructions Start Date   FLUoxetine 10 MG capsule  Commonly known as:  PROzac   10 mg, Oral, Daily      metFORMIN 500 MG tablet  Commonly known as:  GLUCOPHAGE   500 mg, Oral, Daily With Breakfast         Changes to Medications      Instructions Start Date    donepezil 10 MG tablet  Commonly known as:  ARICEPT  What changed:  when to take this   10 mg, Oral, Nightly         Continue These Medications      Instructions Start Date   ARIPiprazole 5 MG tablet  Commonly known as:  ABILIFY   5 mg, Oral, Daily      hydrochlorothiazide 25 MG tablet  Commonly known as:  HYDRODIURIL   25 mg, Oral, Daily      losartan 25 MG tablet  Commonly known as:  COZAAR   25 mg, Oral, Daily      pantoprazole 40 MG EC tablet  Commonly known as:  PROTONIX   40 mg, Oral, Daily      SPIRIVA RESPIMAT 1.25 MCG/ACT aerosol solution inhaler  Generic drug:  Tiotropium Bromide Monohydrate   2 puffs, Inhalation, Daily

## 2018-07-20 NOTE — PLAN OF CARE
Problem: Patient Care Overview  Goal: Plan of Care Review  Outcome: Ongoing (interventions implemented as appropriate)   07/20/18 0230 07/20/18 0501   OTHER   Outcome Summary --  Denied anxiety, depression, SI/HI, and hallucinations. Flat affect noted. Cooperative and med compliant. Will continue to monitor and assess.    Plan of Care Review   Progress --  no change   Coping/Psychosocial   Plan of Care Reviewed With patient --    Coping/Psychosocial   Patient Agreement with Plan of Care agrees --        Problem: Overarching Goals (Adult)  Goal: Adheres to Safety Considerations for Self and Others  Outcome: Ongoing (interventions implemented as appropriate)    Goal: Optimized Coping Skills in Response to Life Stressors  Outcome: Ongoing (interventions implemented as appropriate)    Goal: Develops/Participates in Therapeutic [place] to Support Successful Transition  Outcome: Ongoing (interventions implemented as appropriate)      Problem: Skin Injury Risk (Adult)  Goal: Identify Related Risk Factors and Signs and Symptoms  Outcome: Ongoing (interventions implemented as appropriate)    Goal: Skin Health and Integrity  Outcome: Ongoing (interventions implemented as appropriate)

## 2018-07-20 NOTE — PROGRESS NOTES
"Continued Stay Note  [facility]     Patient Name: Krupa Mcmanus  MRN: [id number]  Today's Date: 7/20/2018    Admit Date: 7/11/2018          Discharge Plan     Row Name 07/20/18 1108       Plan    Final Discharge Disposition Code 06 - home with home health care    Final Note Pt will return home with spouse with home health care through [facility] [phone], per Dr. Ramesh's orders. Pt's spouse, Jose Mcmanus, will be providing transportation at discharge. JESENIA spoke with pt's spouse about providing 24/7 care for the pt, due to recent diagnosis of vascular dementia and the need to maintain her ongoing safety in the home. He agreed with this plan and stated that he is home 24/7 with the pt and pt has family to provide support to him in her care. JESENIA also provided the family with a \"Today's transitions\" resource booklet for future reference for long-term care placement facilites.               Discharge Codes    No documentation.       Expected Discharge Date and Time     Expected Discharge Date Expected Discharge Time    Jul 20, 2018             Brea Bruce LCSW    "

## 2018-07-22 NOTE — ED PROVIDER NOTES
Pt with long term psychiatric history and declining mental health.  She has therapist at [address] [place] [address], lives alone and presents looking for help.  Access has seen and evaluated the patient.  Agrees to admit patient to [facility] for stabilization.  MD ATTESTATION NOTE    The JOSE and I have discussed this patient's history, physical exam, and treatment plan.  I have reviewed the documentation and personally had a face to face interaction with the patient. I affirm the documentation and agree with the treatment and plan.  The attached note describes my personal findings.       Saul Negro MD  07/22/18 9106

## 2018-07-23 ENCOUNTER — TELEPHONE (OUTPATIENT)
Dept: PSYCHIATRY | Facility: HOSPITAL | Age: 63
End: 2018-07-23

## 2018-07-24 ENCOUNTER — TELEPHONE (OUTPATIENT)
Dept: PSYCHIATRY | Facility: HOSPITAL | Age: 63
End: 2018-07-24

## 2019-11-07 NOTE — PROGRESS NOTES
Continued Stay Note  [facility]     Patient Name: Krupa Mcmanus  MRN: [id number]  Today's Date: 7/19/2018    Admit Date: 7/11/2018          Discharge Plan     Row Name 07/19/18 1106       Plan    Plan Comments JESENIA spoke with pt's spouse, Jose Mcmanus [phone], to inform him of pt's expected discharge tomorrow, 07/20 with home health for follow-up through [facility]. JESENIA explained that neuropsych testing results and Dr. Ramesh strongly recommend 24-7 care for the pt and due to recent vascular dementia diagnosis, pt will need increased supervision in the home. Pt's spouse stated that he will be home with the pt, 24/7. JESENIA will contact pt's spouse in the morning regarding pick-up time at discharge.               Discharge Codes    No documentation.           Brea Bruce LCSW     Alert/Awake

## 2020-04-12 ENCOUNTER — LAB REQUISITION (OUTPATIENT)
Dept: LAB | Facility: HOSPITAL | Age: 65
End: 2020-04-12

## 2020-04-12 DIAGNOSIS — Z00.00 ROUTINE GENERAL MEDICAL EXAMINATION AT A HEALTH CARE FACILITY: ICD-10-CM

## 2020-04-12 LAB
ANION GAP SERPL CALCULATED.3IONS-SCNC: 19.4 MMOL/L (ref 5–15)
BASOPHILS # BLD AUTO: 0.05 10*3/MM3 (ref 0–0.2)
BASOPHILS NFR BLD AUTO: 0.5 % (ref 0–1.5)
BUN BLD-MCNC: 12 MG/DL (ref 8–23)
BUN/CREAT SERPL: 18.5 (ref 7–25)
CALCIUM SPEC-SCNC: 8.8 MG/DL (ref 8.6–10.5)
CHLORIDE SERPL-SCNC: 94 MMOL/L (ref 98–107)
CO2 SERPL-SCNC: 25.6 MMOL/L (ref 22–29)
CREAT BLD-MCNC: 0.65 MG/DL (ref 0.57–1)
DEPRECATED RDW RBC AUTO: 45.1 FL (ref 37–54)
EOSINOPHIL # BLD AUTO: 0.33 10*3/MM3 (ref 0–0.4)
EOSINOPHIL NFR BLD AUTO: 3.1 % (ref 0.3–6.2)
ERYTHROCYTE [DISTWIDTH] IN BLOOD BY AUTOMATED COUNT: 14.6 % (ref 12.3–15.4)
GFR SERPL CREATININE-BSD FRML MDRD: 91 ML/MIN/1.73
GLUCOSE BLD-MCNC: 156 MG/DL (ref 65–99)
HCT VFR BLD AUTO: 36.7 % (ref 34–46.6)
HGB BLD-MCNC: 11.9 G/DL (ref 12–15.9)
IMM GRANULOCYTES # BLD AUTO: 0.02 10*3/MM3 (ref 0–0.05)
IMM GRANULOCYTES NFR BLD AUTO: 0.2 % (ref 0–0.5)
LYMPHOCYTES # BLD AUTO: 1.26 10*3/MM3 (ref 0.7–3.1)
LYMPHOCYTES NFR BLD AUTO: 11.9 % (ref 19.6–45.3)
MCH RBC QN AUTO: 27.4 PG (ref 26.6–33)
MCHC RBC AUTO-ENTMCNC: 32.4 G/DL (ref 31.5–35.7)
MCV RBC AUTO: 84.6 FL (ref 79–97)
MONOCYTES # BLD AUTO: 1.06 10*3/MM3 (ref 0.1–0.9)
MONOCYTES NFR BLD AUTO: 10 % (ref 5–12)
NEUTROPHILS # BLD AUTO: 7.88 10*3/MM3 (ref 1.7–7)
NEUTROPHILS NFR BLD AUTO: 74.3 % (ref 42.7–76)
NRBC BLD AUTO-RTO: 0 /100 WBC (ref 0–0.2)
PLATELET # BLD AUTO: 200 10*3/MM3 (ref 140–450)
PMV BLD AUTO: 10.9 FL (ref 6–12)
POTASSIUM BLD-SCNC: 3.7 MMOL/L (ref 3.5–5.2)
RBC # BLD AUTO: 4.34 10*6/MM3 (ref 3.77–5.28)
SODIUM BLD-SCNC: 139 MMOL/L (ref 136–145)
WBC NRBC COR # BLD: 10.6 10*3/MM3 (ref 3.4–10.8)

## 2020-04-12 PROCEDURE — 85025 COMPLETE CBC W/AUTO DIFF WBC: CPT

## 2020-04-12 PROCEDURE — 80048 BASIC METABOLIC PNL TOTAL CA: CPT

## 2021-08-31 ENCOUNTER — HOSPITAL ENCOUNTER (EMERGENCY)
Facility: HOSPITAL | Age: 66
Discharge: LEFT BEFORE DISCHARGE | End: 2021-09-01

## 2021-08-31 VITALS
OXYGEN SATURATION: 98 % | HEART RATE: 90 BPM | TEMPERATURE: 97.5 F | SYSTOLIC BLOOD PRESSURE: 103 MMHG | RESPIRATION RATE: 16 BRPM | DIASTOLIC BLOOD PRESSURE: 77 MMHG

## 2021-08-31 PROCEDURE — 99211 OFF/OP EST MAY X REQ PHY/QHP: CPT

## 2021-09-01 ENCOUNTER — APPOINTMENT (OUTPATIENT)
Dept: GENERAL RADIOLOGY | Facility: HOSPITAL | Age: 66
End: 2021-09-01

## 2021-09-01 RX ORDER — SODIUM CHLORIDE 0.9 % (FLUSH) 0.9 %
10 SYRINGE (ML) INJECTION AS NEEDED
Status: DISCONTINUED | OUTPATIENT
Start: 2021-09-01 | End: 2021-09-01 | Stop reason: HOSPADM

## 2021-09-01 NOTE — ED NOTES
Called # listed in demographics to check location of pt for labs. No voicemail set up.      Dyan Smith, RN  09/01/21 0042

## 2021-09-01 NOTE — ED TRIAGE NOTES
Pt to ER from home via [facility] (incident # [id number]) with c/o dizziness and vomiting. Pt states symptoms started at approx 1800 after eating dinner.     Pt masked in triage, staff in appropriate ppe.

## 2021-09-26 ENCOUNTER — HOSPITAL ENCOUNTER (EMERGENCY)
Facility: HOSPITAL | Age: 66
Discharge: HOME | End: 2021-09-26
Attending: EMERGENCY MEDICINE | Admitting: EMERGENCY MEDICINE

## 2021-09-26 ENCOUNTER — APPOINTMENT (OUTPATIENT)
Dept: GENERAL RADIOLOGY | Facility: HOSPITAL | Age: 66
End: 2021-09-26

## 2021-09-26 VITALS
SYSTOLIC BLOOD PRESSURE: 107 MMHG | RESPIRATION RATE: 18 BRPM | HEIGHT: 66 IN | OXYGEN SATURATION: 98 % | HEART RATE: 58 BPM | TEMPERATURE: 98.4 F | DIASTOLIC BLOOD PRESSURE: 66 MMHG | BODY MASS INDEX: 30.32 KG/M2

## 2021-09-26 DIAGNOSIS — R10.10 UPPER ABDOMINAL PAIN: Primary | ICD-10-CM

## 2021-09-26 LAB
ALBUMIN SERPL-MCNC: 3.6 G/DL (ref 3.5–5.2)
ALBUMIN/GLOB SERPL: 1.5 G/DL
ALP SERPL-CCNC: 68 U/L (ref 39–117)
ALT SERPL W P-5'-P-CCNC: 23 U/L (ref 1–33)
ANION GAP SERPL CALCULATED.3IONS-SCNC: 9.6 MMOL/L (ref 5–15)
AST SERPL-CCNC: 24 U/L (ref 1–32)
BASOPHILS # BLD AUTO: 0.05 10*3/MM3 (ref 0–0.2)
BASOPHILS NFR BLD AUTO: 0.8 % (ref 0–1.5)
BILIRUB SERPL-MCNC: 0.3 MG/DL (ref 0–1.2)
BILIRUB UR QL STRIP: NEGATIVE
BUN SERPL-MCNC: 16 MG/DL (ref 8–23)
BUN/CREAT SERPL: 25.4 (ref 7–25)
CALCIUM SPEC-SCNC: 8.8 MG/DL (ref 8.6–10.5)
CHLORIDE SERPL-SCNC: 105 MMOL/L (ref 98–107)
CLARITY UR: CLEAR
CO2 SERPL-SCNC: 24.4 MMOL/L (ref 22–29)
COLOR UR: YELLOW
CREAT SERPL-MCNC: 0.63 MG/DL (ref 0.57–1)
DEPRECATED RDW RBC AUTO: 46.4 FL (ref 37–54)
EOSINOPHIL # BLD AUTO: 0.23 10*3/MM3 (ref 0–0.4)
EOSINOPHIL NFR BLD AUTO: 3.5 % (ref 0.3–6.2)
ERYTHROCYTE [DISTWIDTH] IN BLOOD BY AUTOMATED COUNT: 14.4 % (ref 12.3–15.4)
GFR SERPL CREATININE-BSD FRML MDRD: 94 ML/MIN/1.73
GLOBULIN UR ELPH-MCNC: 2.4 GM/DL
GLUCOSE SERPL-MCNC: 103 MG/DL (ref 65–99)
GLUCOSE UR STRIP-MCNC: NEGATIVE MG/DL
HCT VFR BLD AUTO: 33.1 % (ref 34–46.6)
HGB BLD-MCNC: 10.7 G/DL (ref 12–15.9)
HGB UR QL STRIP.AUTO: NEGATIVE
HOLD SPECIMEN: NORMAL
IMM GRANULOCYTES # BLD AUTO: 0.01 10*3/MM3 (ref 0–0.05)
IMM GRANULOCYTES NFR BLD AUTO: 0.2 % (ref 0–0.5)
KETONES UR QL STRIP: ABNORMAL
LEUKOCYTE ESTERASE UR QL STRIP.AUTO: NEGATIVE
LIPASE SERPL-CCNC: 35 U/L (ref 13–60)
LYMPHOCYTES # BLD AUTO: 2.1 10*3/MM3 (ref 0.7–3.1)
LYMPHOCYTES NFR BLD AUTO: 32.3 % (ref 19.6–45.3)
MCH RBC QN AUTO: 28.9 PG (ref 26.6–33)
MCHC RBC AUTO-ENTMCNC: 32.3 G/DL (ref 31.5–35.7)
MCV RBC AUTO: 89.5 FL (ref 79–97)
MONOCYTES # BLD AUTO: 0.65 10*3/MM3 (ref 0.1–0.9)
MONOCYTES NFR BLD AUTO: 10 % (ref 5–12)
NEUTROPHILS NFR BLD AUTO: 3.47 10*3/MM3 (ref 1.7–7)
NEUTROPHILS NFR BLD AUTO: 53.2 % (ref 42.7–76)
NITRITE UR QL STRIP: NEGATIVE
NRBC BLD AUTO-RTO: 0 /100 WBC (ref 0–0.2)
PH UR STRIP.AUTO: 6 [PH] (ref 5–8)
PLATELET # BLD AUTO: 246 10*3/MM3 (ref 140–450)
PMV BLD AUTO: 11.2 FL (ref 6–12)
POTASSIUM SERPL-SCNC: 4.2 MMOL/L (ref 3.5–5.2)
PROT SERPL-MCNC: 6 G/DL (ref 6–8.5)
PROT UR QL STRIP: NEGATIVE
QT INTERVAL: 427 MS
RBC # BLD AUTO: 3.7 10*6/MM3 (ref 3.77–5.28)
SODIUM SERPL-SCNC: 139 MMOL/L (ref 136–145)
SP GR UR STRIP: 1.03 (ref 1–1.03)
TROPONIN T SERPL-MCNC: <0.01 NG/ML (ref 0–0.03)
TROPONIN T SERPL-MCNC: <0.01 NG/ML (ref 0–0.03)
UROBILINOGEN UR QL STRIP: ABNORMAL
WBC # BLD AUTO: 6.51 10*3/MM3 (ref 3.4–10.8)
WHOLE BLOOD HOLD SPECIMEN: NORMAL

## 2021-09-26 PROCEDURE — 93005 ELECTROCARDIOGRAM TRACING: CPT

## 2021-09-26 PROCEDURE — 83690 ASSAY OF LIPASE: CPT | Performed by: PHYSICIAN ASSISTANT

## 2021-09-26 PROCEDURE — 93005 ELECTROCARDIOGRAM TRACING: CPT | Performed by: EMERGENCY MEDICINE

## 2021-09-26 PROCEDURE — 85025 COMPLETE CBC W/AUTO DIFF WBC: CPT

## 2021-09-26 PROCEDURE — 81003 URINALYSIS AUTO W/O SCOPE: CPT | Performed by: PHYSICIAN ASSISTANT

## 2021-09-26 PROCEDURE — 71045 X-RAY EXAM CHEST 1 VIEW: CPT

## 2021-09-26 PROCEDURE — 99284 EMERGENCY DEPT VISIT MOD MDM: CPT

## 2021-09-26 PROCEDURE — 84484 ASSAY OF TROPONIN QUANT: CPT

## 2021-09-26 PROCEDURE — 80053 COMPREHEN METABOLIC PANEL: CPT

## 2021-09-26 PROCEDURE — 93010 ELECTROCARDIOGRAM REPORT: CPT | Performed by: INTERNAL MEDICINE

## 2021-09-26 RX ORDER — SODIUM CHLORIDE 0.9 % (FLUSH) 0.9 %
10 SYRINGE (ML) INJECTION AS NEEDED
Status: DISCONTINUED | OUTPATIENT
Start: 2021-09-26 | End: 2021-09-26 | Stop reason: HOSPADM

## 2021-09-26 NOTE — ED NOTES
"Pt to triage from home with c/o intermittent chest pain since 1700.  States went to [facility] just prior to arrival here, but states she couldn't get in to the ER, so she came here. Pt denies soa, diaphoresis with onset.  Pt history of hiatal hernia.  Pt points to substernal area with \"dull sharp pain\".  Pt wearing mask in triage.  Triage personnel wore appropriate PPE       Rosenda Grimm RN  09/26/21 0604    "

## 2021-09-26 NOTE — ED PROVIDER NOTES
07:02 EDT  Patient seen and examined with physician assistant.  Briefly patient presents for evaluation of upper abdominal pain and chest pain.  Patient has had long history of gastritis.  States she was seen at outside hospital yesterday.  Told she had gastritis and GI issues.  Patient then went to [employer] and states chest and abdominal pain came back.  Has had some nausea and vomiting.  Patient has seen gastroenterologist multiple times is never seen a heart doctor.          On exam patient is alert cooperative in no distress.  Patient's heart is regular rate and rhythm and lungs are clear bilaterally.  Patient's abdomen is tender in the epigastric area.    EKG          EKG time: 613  Rhythm/Rate: Sinus bradycardia 52  P waves and VT: Normal P waves  QRS, axis: Normal QRS  ST and T waves: Nonspecific ST-T wave    Interpreted Contemporaneously by me, independently viewed  No prior          Plan will be lab evaluation EKG    ED Course as of Sep 26 0937   Sun Sep 26, 2021   0840 Patient's troponin x2 are negative.  She has no history of coronary artery disease.  The symptoms started after she ate [employer].  It is very likely this is GI related but will refer cardiology when discharged.  She agrees with the plan of care.    [SS]   0847 08:45 EDT  Patient presents with chest pain abdominal pain. Patient has EKG and serial troponins that are normal. Patient has had long history of GI related issues. Patient then ate at [employer] last night. Patient asking for snacks here. She is alert and in no distress watching television. We will discharge her home. She has a GI doctor to follow-up with and we will be giving her cardiology as well instructed to return here for worsening symptoms.    [SL]      ED Course User Index  [SL] Dariel Donnelly MD  [SS] Karin Abel PA-C      Diagnosis Plan   1. Upper abdominal pain         DISCHARGE    Patient discharged in stable condition.    Reviewed implications of  results, diagnosis, meds, responsibility to follow up, warning signs and symptoms of possible worsening, potential complications and reasons to return to ER, including worsening symptoms.    Patient/Family voiced understanding of above instructions.    Discussed plan for discharge, as there is no emergent indication for admission. Patient referred to primary care provider for BP management due to today's BP. Pt/family is agreeable and understands need for follow up and repeat testing.  Pt is aware that discharge does not mean that nothing is wrong but it indicates no emergency is present that requires admission and they must continue care with follow-up as given below or physician of their choice.     FOLLOW-UP  [facility]  [address]  [address]  [place] [address] [phone]  Schedule an appointment as soon as possible for a visit            Medication List      No changes were made to your prescriptions during this visit.           MD ATTESTATION NOTE    The JOSE and I have discussed this patient's history, physical exam, and treatment plan.  I have reviewed the documentation and personally had a face to face interaction with the patient. I affirm the documentation and agree with the treatment and plan.  The attached note describes my personal findings.      Patient was wearing a face mask when I entered the room and they continued to wear a mask throughout their stay in the ED.  I wore PPE, including  gloves, face mask with shield or face mask with goggles whenever I was in the room with patient. n95 worn     Dariel Donnelly MD  09/26/21 0645

## 2021-09-26 NOTE — ED NOTES
Pt asking for snack, states pain is better, ER MD Donnelly notified.      Robel Randolph, RN  09/26/21 0706

## 2021-09-26 NOTE — ED PROVIDER NOTES
EMERGENCY DEPARTMENT ENCOUNTER    Room Number:  15/15  Date seen:  9/26/2021  Time seen: 06:35 EDT  PCP: Provider, No Known  Historian: Parent    HPI:  Chief complaint: Chest pain  A complete HPI/ROS/PMH/PSH/SH/FH are unobtainable due to: None  Context:Krupa Mcmanus is a 65 y.o. female, who presents to the ED with c/o intermittent chest pain after she ate [employer] last night around 6 PM.  She says that the chest pain lasted for approximately 30 minutes to 1 hour until it spontaneously resolves.  Nothing makes it better or worse.  Associated symptoms include shortness of breath, nausea, generalized abdominal pain.  Denies vomiting.    Patient was placed in face mask in first look. Patient was wearing facemask when I entered the room and throughout our encounter. I wore full protective equipment throughout this patient encounter including a N95 face mask, goggles, and gloves. Hand hygiene was performed before donning protective equipment and after removal when leaving the room.      MEDICAL RECORD REVIEW  Patient has been seen at [facility] and children[facility] emergency room 12 times this month for similar symptoms.  She was last seen at that emergency room yesterday for chest pain and was discharged with unremarkable results.    ALLERGIES  Penicillins, Amoxicillin, Ceclor [cefaclor], and Zyprexa [olanzapine]    PAST MEDICAL HISTORY  Active Ambulatory Problems     Diagnosis Date Noted   • Affective psychosis, bipolar (CMS/HCC) 07/11/2018   • Essential hypertension 07/12/2018   • Mild intermittent asthma without complication 07/12/2018   • Hypokalemia 07/12/2018   • Leukocytosis 07/12/2018   • Elevated liver enzymes 07/12/2018   • Type 2 diabetes mellitus (CMS/HCC) 07/12/2018     Resolved Ambulatory Problems     Diagnosis Date Noted   • No Resolved Ambulatory Problems     Past Medical History:   Diagnosis Date   • Alzheimer disease (CMS/[facility])    • Anxiety    • Bipolar 1 disorder (CMS/[facility])    • Depression    •  Diabetes mellitus (CMS/[facility])    • GERD (gastroesophageal reflux disease)    • Rheumatoid arthritis (CMS/[facility])        PAST SURGICAL HISTORY  Past Surgical History:   Procedure Laterality Date   • CHOLECYSTECTOMY     • HYSTERECTOMY         FAMILY HISTORY  History reviewed. No pertinent family history.    SOCIAL HISTORY  Social History     Socioeconomic History   • Marital status:      Spouse name: Not on file   • Number of children: Not on file   • Years of education: Not on file   • Highest education level: Not on file       REVIEW OF SYSTEMS  Review of Systems    All systems reviewed and negative except for those discussed in HPI.     PHYSICAL EXAM    ED Triage Vitals [09/26/21 0601]   Temp Heart Rate Resp BP SpO2   98.4 °F (36.9 °C) 55 14 118/61 98 %      Temp src Heart Rate Source Patient Position BP Location FiO2 (%)   Temporal Monitor Sitting Right arm --     Physical Exam    I have reviewed the triage vital signs and nursing notes.      GENERAL: not distressed  HENT: nares patent  EYES: no scleral icterus  NECK: no ROM limitations  CV: regular rhythm, regular rate, reproducible chest pain  RESPIRATORY: normal effort, clear to auscultation bilaterally  ABDOMEN: soft; nontender  : deferred  MUSCULOSKELETAL: no deformity, no lower extremity swelling, no calf tenderness  NEURO: alert, moves all extremities, follows commands  SKIN: warm, dry    LAB RESULTS  Recent Results (from the past 24 hour(s))   ECG 12 Lead    Collection Time: 09/26/21  6:13 AM   Result Value Ref Range    QT Interval 427 ms   Comprehensive Metabolic Panel    Collection Time: 09/26/21  6:48 AM    Specimen: Blood   Result Value Ref Range    Glucose 103 (H) 65 - 99 mg/dL    BUN 16 8 - 23 mg/dL    Creatinine 0.63 0.57 - 1.00 mg/dL    Sodium 139 136 - 145 mmol/L    Potassium 4.2 3.5 - 5.2 mmol/L    Chloride 105 98 - 107 mmol/L    CO2 24.4 22.0 - 29.0 mmol/L    Calcium 8.8 8.6 - 10.5 mg/dL    Total Protein 6.0 6.0 - 8.5 g/dL    Albumin 3.60  3.50 - 5.20 g/dL    ALT (SGPT) 23 1 - 33 U/L    AST (SGOT) 24 1 - 32 U/L    Alkaline Phosphatase 68 39 - 117 U/L    Total Bilirubin 0.3 0.0 - 1.2 mg/dL    eGFR Non African Amer 94 >60 mL/min/1.73    Globulin 2.4 gm/dL    A/G Ratio 1.5 g/dL    BUN/Creatinine Ratio 25.4 (H) 7.0 - 25.0    Anion Gap 9.6 5.0 - 15.0 mmol/L   Troponin    Collection Time: 09/26/21  6:48 AM    Specimen: Blood   Result Value Ref Range    Troponin T <0.010 0.000 - 0.030 ng/mL   Green Top (Gel)    Collection Time: 09/26/21  6:48 AM   Result Value Ref Range    Extra Tube Hold for add-ons.    Lavender Top    Collection Time: 09/26/21  6:48 AM   Result Value Ref Range    Extra Tube hold for add-on    CBC Auto Differential    Collection Time: 09/26/21  6:48 AM    Specimen: Blood   Result Value Ref Range    WBC 6.51 3.40 - 10.80 10*3/mm3    RBC 3.70 (L) 3.77 - 5.28 10*6/mm3    Hemoglobin 10.7 (L) 12.0 - 15.9 g/dL    Hematocrit 33.1 (L) 34.0 - 46.6 %    MCV 89.5 79.0 - 97.0 fL    MCH 28.9 26.6 - 33.0 pg    MCHC 32.3 31.5 - 35.7 g/dL    RDW 14.4 12.3 - 15.4 %    RDW-SD 46.4 37.0 - 54.0 fl    MPV 11.2 6.0 - 12.0 fL    Platelets 246 140 - 450 10*3/mm3    Neutrophil % 53.2 42.7 - 76.0 %    Lymphocyte % 32.3 19.6 - 45.3 %    Monocyte % 10.0 5.0 - 12.0 %    Eosinophil % 3.5 0.3 - 6.2 %    Basophil % 0.8 0.0 - 1.5 %    Immature Grans % 0.2 0.0 - 0.5 %    Neutrophils, Absolute 3.47 1.70 - 7.00 10*3/mm3    Lymphocytes, Absolute 2.10 0.70 - 3.10 10*3/mm3    Monocytes, Absolute 0.65 0.10 - 0.90 10*3/mm3    Eosinophils, Absolute 0.23 0.00 - 0.40 10*3/mm3    Basophils, Absolute 0.05 0.00 - 0.20 10*3/mm3    Immature Grans, Absolute 0.01 0.00 - 0.05 10*3/mm3    nRBC 0.0 0.0 - 0.2 /100 WBC   Lipase    Collection Time: 09/26/21  6:48 AM    Specimen: Blood   Result Value Ref Range    Lipase 35 13 - 60 U/L   Urinalysis With Microscopic If Indicated (No Culture) - Urine, Clean Catch    Collection Time: 09/26/21  7:35 AM    Specimen: Urine, Clean Catch   Result Value Ref  Range    Color, UA Yellow Yellow, Straw    Appearance, UA Clear Clear    pH, UA 6.0 5.0 - 8.0    Specific Gravity, UA 1.027 1.005 - 1.030    Glucose, UA Negative Negative    Ketones, UA Trace (A) Negative    Bilirubin, UA Negative Negative    Blood, UA Negative Negative    Protein, UA Negative Negative    Leuk Esterase, UA Negative Negative    Nitrite, UA Negative Negative    Urobilinogen, UA 1.0 E.U./dL 0.2 - 1.0 E.U./dL   Troponin    Collection Time: 09/26/21  8:15 AM    Specimen: Blood   Result Value Ref Range    Troponin T <0.010 0.000 - 0.030 ng/mL         RADIOLOGY RESULTS  XR Chest 1 View   Final Result   No focal pulmonary consolidation. Follow-up as clinical   indications persist.       This report was finalized on 9/26/2021 6:32 AM by Dr. Marko Hess M.D.                PROGRESS, DATA ANALYSIS, CONSULTS AND MEDICAL DECISION MAKING  All labs have been independently reviewed by me.  All radiology studies have been reviewed by me and discussed with radiologist dictating the report. Discussion below represents my analysis of pertinent findings related to patient's condition, differential diagnosis, treatment plan and final disposition.     ED Course as of Sep 26 0914   Sun Sep 26, 2021   0840 Patient's troponin x2 are negative.  She has no history of coronary artery disease.  The symptoms started after she ate [employer].  It is very likely this is GI related but will refer cardiology when discharged.  She agrees with the plan of care.    [SS]   0845 08:45 EDT  Patient presents with chest pain abdominal pain. Patient has EKG and serial troponins that are normal. Patient has had long history of GI related issues. Patient then ate at [employer] last night. Patient asking for snacks here. She is alert and in no distress watching television. We will discharge her home. She has a GI doctor to follow-up with and we will be giving her cardiology as well instructed to return here for worsening symptoms.  "   [SL]      ED Course User Index  [SL] Dariel Donnelly MD  [SS] Karin Abel PA-C     HEART SCORE    History Slightly or non-suspicious (0)  ECG Normal (0)  Age > or = 65 (2)  Risk factors 1 or 2 (1)  Troponin < or = Normal limit (0)    This patient's HEART score is 3    HEART Score Key:  Scores 0-3: 0.9-1.7% risk of adverse cardiac event. In the HEART Score study, these patients were discharged (0.99% in the retrospective study, 1.7% in the prospective study)  Scores 4-6: 12-16.6% risk of adverse cardiac event. In the HEART Score study, these patients were admitted to the hospital. (11.6% retrospective, 16.6% prospective)  Scores ?7: 50-65% risk of adverse cardiac event. In the HEART Score study, these patients were candidates for early invasive measures. (65.2% retrospective, 50.1% prospective)      The differential diagnosis include but are not limited to ACS, GERD, pancreatitis.         Reviewed pt's history and workup with Dr. Donnelly.  After a bedside evaluation, Dr. Donnelly agrees with the plan of care.    (FOR DISCHARGE)The patient's history, physical exam, and lab findings were discussed with the physician, who also performed a face to face history and physical exam.  I discussed all results and noted any abnormalities with patient.  Discussed absoute need to recheck abnormalities with their family physician.  I answered any of the patient's questions.  Discussed plan for discharge, as there is no emergent indication for admission.  Pt is agreeable and understands need for follow up and repeat testing.  Pt is aware that discharge does not mean that nothing is wrong but it indicates no emergency is present and they must continue care with their family physician.  Pt is discharged with instructions to follow up with primary care doctor to have their blood pressure rechecked.         Disposition vitals:  /66   Pulse 58   Temp 98.4 °F (36.9 °C) (Temporal)   Resp 18   Ht 166.4 cm (65.5\")   " LMP 07/11/2018 Comment: postmenopausal  SpO2 98%   BMI 30.32 kg/m²       DIAGNOSIS  Final diagnoses:   Upper abdominal pain       FOLLOW UP   [facility] CARDIOLOGY  [address]  [address]  [place] [id number] [phone]  Schedule an appointment as soon as possible for a visit            Karin Abel PA-C  09/26/21 0915

## 2021-10-02 PROCEDURE — 99284 EMERGENCY DEPT VISIT MOD MDM: CPT

## 2021-10-02 PROCEDURE — 96374 THER/PROPH/DIAG INJ IV PUSH: CPT

## 2021-10-03 ENCOUNTER — APPOINTMENT (OUTPATIENT)
Dept: GENERAL RADIOLOGY | Facility: HOSPITAL | Age: 66
End: 2021-10-03

## 2021-10-03 ENCOUNTER — HOSPITAL ENCOUNTER (EMERGENCY)
Facility: HOSPITAL | Age: 66
Discharge: HOME | End: 2021-10-03
Attending: EMERGENCY MEDICINE | Admitting: EMERGENCY MEDICINE

## 2021-10-03 ENCOUNTER — APPOINTMENT (OUTPATIENT)
Dept: CT IMAGING | Facility: HOSPITAL | Age: 66
End: 2021-10-03

## 2021-10-03 VITALS
DIASTOLIC BLOOD PRESSURE: 43 MMHG | HEART RATE: 58 BPM | BODY MASS INDEX: 36.99 KG/M2 | WEIGHT: 222 LBS | HEIGHT: 65 IN | SYSTOLIC BLOOD PRESSURE: 109 MMHG | TEMPERATURE: 99.1 F | OXYGEN SATURATION: 96 % | RESPIRATION RATE: 17 BRPM

## 2021-10-03 VITALS
WEIGHT: 222 LBS | TEMPERATURE: 97.6 F | HEIGHT: 65 IN | BODY MASS INDEX: 36.99 KG/M2 | DIASTOLIC BLOOD PRESSURE: 61 MMHG | HEART RATE: 67 BPM | SYSTOLIC BLOOD PRESSURE: 102 MMHG | RESPIRATION RATE: 18 BRPM | OXYGEN SATURATION: 96 %

## 2021-10-03 DIAGNOSIS — R07.89 ATYPICAL CHEST PAIN: Primary | ICD-10-CM

## 2021-10-03 LAB
ALBUMIN SERPL-MCNC: 3.7 G/DL (ref 3.5–5.2)
ALBUMIN SERPL-MCNC: 3.7 G/DL (ref 3.5–5.2)
ALBUMIN/GLOB SERPL: 1.5 G/DL
ALBUMIN/GLOB SERPL: 1.5 G/DL
ALP SERPL-CCNC: 69 U/L (ref 39–117)
ALP SERPL-CCNC: 70 U/L (ref 39–117)
ALT SERPL W P-5'-P-CCNC: 30 U/L (ref 1–33)
ALT SERPL W P-5'-P-CCNC: 30 U/L (ref 1–33)
ANION GAP SERPL CALCULATED.3IONS-SCNC: 12 MMOL/L (ref 5–15)
ANION GAP SERPL CALCULATED.3IONS-SCNC: 7.9 MMOL/L (ref 5–15)
AST SERPL-CCNC: 29 U/L (ref 1–32)
AST SERPL-CCNC: 33 U/L (ref 1–32)
BASOPHILS # BLD AUTO: 0.05 10*3/MM3 (ref 0–0.2)
BASOPHILS # BLD AUTO: 0.06 10*3/MM3 (ref 0–0.2)
BASOPHILS NFR BLD AUTO: 0.7 % (ref 0–1.5)
BASOPHILS NFR BLD AUTO: 0.7 % (ref 0–1.5)
BILIRUB SERPL-MCNC: 0.2 MG/DL (ref 0–1.2)
BILIRUB SERPL-MCNC: 0.2 MG/DL (ref 0–1.2)
BILIRUB UR QL STRIP: NEGATIVE
BUN SERPL-MCNC: 17 MG/DL (ref 8–23)
BUN SERPL-MCNC: 18 MG/DL (ref 8–23)
BUN/CREAT SERPL: 23 (ref 7–25)
BUN/CREAT SERPL: 24 (ref 7–25)
CALCIUM SPEC-SCNC: 8.8 MG/DL (ref 8.6–10.5)
CALCIUM SPEC-SCNC: 8.9 MG/DL (ref 8.6–10.5)
CHLORIDE SERPL-SCNC: 101 MMOL/L (ref 98–107)
CHLORIDE SERPL-SCNC: 102 MMOL/L (ref 98–107)
CLARITY UR: CLEAR
CO2 SERPL-SCNC: 28 MMOL/L (ref 22–29)
CO2 SERPL-SCNC: 28.1 MMOL/L (ref 22–29)
COLOR UR: YELLOW
CREAT SERPL-MCNC: 0.74 MG/DL (ref 0.57–1)
CREAT SERPL-MCNC: 0.75 MG/DL (ref 0.57–1)
D DIMER PPP FEU-MCNC: 0.3 MCGFEU/ML (ref 0–0.49)
DEPRECATED RDW RBC AUTO: 45.9 FL (ref 37–54)
DEPRECATED RDW RBC AUTO: 47.4 FL (ref 37–54)
EOSINOPHIL # BLD AUTO: 0.14 10*3/MM3 (ref 0–0.4)
EOSINOPHIL # BLD AUTO: 0.19 10*3/MM3 (ref 0–0.4)
EOSINOPHIL NFR BLD AUTO: 2 % (ref 0.3–6.2)
EOSINOPHIL NFR BLD AUTO: 2.3 % (ref 0.3–6.2)
ERYTHROCYTE [DISTWIDTH] IN BLOOD BY AUTOMATED COUNT: 14.3 % (ref 12.3–15.4)
ERYTHROCYTE [DISTWIDTH] IN BLOOD BY AUTOMATED COUNT: 14.4 % (ref 12.3–15.4)
GFR SERPL CREATININE-BSD FRML MDRD: 77 ML/MIN/1.73
GFR SERPL CREATININE-BSD FRML MDRD: 78 ML/MIN/1.73
GLOBULIN UR ELPH-MCNC: 2.5 GM/DL
GLOBULIN UR ELPH-MCNC: 2.5 GM/DL
GLUCOSE SERPL-MCNC: 101 MG/DL (ref 65–99)
GLUCOSE SERPL-MCNC: 115 MG/DL (ref 65–99)
GLUCOSE UR STRIP-MCNC: NEGATIVE MG/DL
HCT VFR BLD AUTO: 32.8 % (ref 34–46.6)
HCT VFR BLD AUTO: 33.7 % (ref 34–46.6)
HGB BLD-MCNC: 10.5 G/DL (ref 12–15.9)
HGB BLD-MCNC: 10.5 G/DL (ref 12–15.9)
HGB UR QL STRIP.AUTO: NEGATIVE
IMM GRANULOCYTES # BLD AUTO: 0.02 10*3/MM3 (ref 0–0.05)
IMM GRANULOCYTES # BLD AUTO: 0.02 10*3/MM3 (ref 0–0.05)
IMM GRANULOCYTES NFR BLD AUTO: 0.2 % (ref 0–0.5)
IMM GRANULOCYTES NFR BLD AUTO: 0.3 % (ref 0–0.5)
KETONES UR QL STRIP: ABNORMAL
LEUKOCYTE ESTERASE UR QL STRIP.AUTO: NEGATIVE
LIPASE SERPL-CCNC: 32 U/L (ref 13–60)
LIPASE SERPL-CCNC: 33 U/L (ref 13–60)
LYMPHOCYTES # BLD AUTO: 2.04 10*3/MM3 (ref 0.7–3.1)
LYMPHOCYTES # BLD AUTO: 2.57 10*3/MM3 (ref 0.7–3.1)
LYMPHOCYTES NFR BLD AUTO: 29.9 % (ref 19.6–45.3)
LYMPHOCYTES NFR BLD AUTO: 30.6 % (ref 19.6–45.3)
MAGNESIUM SERPL-MCNC: 2 MG/DL (ref 1.6–2.4)
MCH RBC QN AUTO: 28 PG (ref 26.6–33)
MCH RBC QN AUTO: 28.3 PG (ref 26.6–33)
MCHC RBC AUTO-ENTMCNC: 31.2 G/DL (ref 31.5–35.7)
MCHC RBC AUTO-ENTMCNC: 32 G/DL (ref 31.5–35.7)
MCV RBC AUTO: 88.4 FL (ref 79–97)
MCV RBC AUTO: 89.9 FL (ref 79–97)
MONOCYTES # BLD AUTO: 0.73 10*3/MM3 (ref 0.1–0.9)
MONOCYTES # BLD AUTO: 0.79 10*3/MM3 (ref 0.1–0.9)
MONOCYTES NFR BLD AUTO: 10.7 % (ref 5–12)
MONOCYTES NFR BLD AUTO: 9.4 % (ref 5–12)
NEUTROPHILS NFR BLD AUTO: 3.85 10*3/MM3 (ref 1.7–7)
NEUTROPHILS NFR BLD AUTO: 4.77 10*3/MM3 (ref 1.7–7)
NEUTROPHILS NFR BLD AUTO: 56.4 % (ref 42.7–76)
NEUTROPHILS NFR BLD AUTO: 56.8 % (ref 42.7–76)
NITRITE UR QL STRIP: NEGATIVE
NRBC BLD AUTO-RTO: 0 /100 WBC (ref 0–0.2)
NRBC BLD AUTO-RTO: 0 /100 WBC (ref 0–0.2)
NT-PROBNP SERPL-MCNC: 112.6 PG/ML (ref 0–900)
NT-PROBNP SERPL-MCNC: 62.4 PG/ML (ref 0–900)
PH UR STRIP.AUTO: 5.5 [PH] (ref 5–8)
PLATELET # BLD AUTO: 270 10*3/MM3 (ref 140–450)
PLATELET # BLD AUTO: 289 10*3/MM3 (ref 140–450)
PMV BLD AUTO: 10.1 FL (ref 6–12)
PMV BLD AUTO: 10.3 FL (ref 6–12)
POTASSIUM SERPL-SCNC: 4 MMOL/L (ref 3.5–5.2)
POTASSIUM SERPL-SCNC: 4.7 MMOL/L (ref 3.5–5.2)
PROT SERPL-MCNC: 6.2 G/DL (ref 6–8.5)
PROT SERPL-MCNC: 6.2 G/DL (ref 6–8.5)
PROT UR QL STRIP: NEGATIVE
RBC # BLD AUTO: 3.71 10*6/MM3 (ref 3.77–5.28)
RBC # BLD AUTO: 3.75 10*6/MM3 (ref 3.77–5.28)
SODIUM SERPL-SCNC: 138 MMOL/L (ref 136–145)
SODIUM SERPL-SCNC: 141 MMOL/L (ref 136–145)
SP GR UR STRIP: 1.02 (ref 1–1.03)
TROPONIN T SERPL-MCNC: <0.01 NG/ML (ref 0–0.03)
UROBILINOGEN UR QL STRIP: ABNORMAL
WBC # BLD AUTO: 6.83 10*3/MM3 (ref 3.4–10.8)
WBC # BLD AUTO: 8.4 10*3/MM3 (ref 3.4–10.8)

## 2021-10-03 PROCEDURE — 93010 ELECTROCARDIOGRAM REPORT: CPT | Performed by: INTERNAL MEDICINE

## 2021-10-03 PROCEDURE — 84484 ASSAY OF TROPONIN QUANT: CPT | Performed by: PHYSICIAN ASSISTANT

## 2021-10-03 PROCEDURE — 83690 ASSAY OF LIPASE: CPT | Performed by: PHYSICIAN ASSISTANT

## 2021-10-03 PROCEDURE — 25010000002 ONDANSETRON PER 1 MG: Performed by: PHYSICIAN ASSISTANT

## 2021-10-03 PROCEDURE — 99283 EMERGENCY DEPT VISIT LOW MDM: CPT

## 2021-10-03 PROCEDURE — 84484 ASSAY OF TROPONIN QUANT: CPT | Performed by: EMERGENCY MEDICINE

## 2021-10-03 PROCEDURE — 83735 ASSAY OF MAGNESIUM: CPT | Performed by: PHYSICIAN ASSISTANT

## 2021-10-03 PROCEDURE — 71045 X-RAY EXAM CHEST 1 VIEW: CPT

## 2021-10-03 PROCEDURE — 83690 ASSAY OF LIPASE: CPT | Performed by: EMERGENCY MEDICINE

## 2021-10-03 PROCEDURE — 85025 COMPLETE CBC W/AUTO DIFF WBC: CPT | Performed by: PHYSICIAN ASSISTANT

## 2021-10-03 PROCEDURE — 85379 FIBRIN DEGRADATION QUANT: CPT | Performed by: EMERGENCY MEDICINE

## 2021-10-03 PROCEDURE — 93005 ELECTROCARDIOGRAM TRACING: CPT

## 2021-10-03 PROCEDURE — 85025 COMPLETE CBC W/AUTO DIFF WBC: CPT | Performed by: EMERGENCY MEDICINE

## 2021-10-03 PROCEDURE — 80053 COMPREHEN METABOLIC PANEL: CPT | Performed by: EMERGENCY MEDICINE

## 2021-10-03 PROCEDURE — 93005 ELECTROCARDIOGRAM TRACING: CPT | Performed by: PHYSICIAN ASSISTANT

## 2021-10-03 PROCEDURE — 80053 COMPREHEN METABOLIC PANEL: CPT | Performed by: PHYSICIAN ASSISTANT

## 2021-10-03 PROCEDURE — 81003 URINALYSIS AUTO W/O SCOPE: CPT | Performed by: PHYSICIAN ASSISTANT

## 2021-10-03 PROCEDURE — 83880 ASSAY OF NATRIURETIC PEPTIDE: CPT | Performed by: PHYSICIAN ASSISTANT

## 2021-10-03 PROCEDURE — 83880 ASSAY OF NATRIURETIC PEPTIDE: CPT | Performed by: EMERGENCY MEDICINE

## 2021-10-03 RX ORDER — FLUCONAZOLE 150 MG/1
150 TABLET ORAL ONCE
Qty: 1 TABLET | Refills: 0 | Status: SHIPPED | OUTPATIENT
Start: 2021-10-03 | End: 2021-10-03

## 2021-10-03 RX ORDER — ALUMINA, MAGNESIA, AND SIMETHICONE 2400; 2400; 240 MG/30ML; MG/30ML; MG/30ML
15 SUSPENSION ORAL ONCE
Status: COMPLETED | OUTPATIENT
Start: 2021-10-03 | End: 2021-10-03

## 2021-10-03 RX ORDER — SODIUM CHLORIDE 0.9 % (FLUSH) 0.9 %
10 SYRINGE (ML) INJECTION AS NEEDED
Status: DISCONTINUED | OUTPATIENT
Start: 2021-10-03 | End: 2021-10-03 | Stop reason: HOSPADM

## 2021-10-03 RX ORDER — LIDOCAINE HYDROCHLORIDE 20 MG/ML
15 SOLUTION OROPHARYNGEAL ONCE
Status: COMPLETED | OUTPATIENT
Start: 2021-10-03 | End: 2021-10-03

## 2021-10-03 RX ORDER — ONDANSETRON 2 MG/ML
4 INJECTION INTRAMUSCULAR; INTRAVENOUS ONCE
Status: COMPLETED | OUTPATIENT
Start: 2021-10-03 | End: 2021-10-03

## 2021-10-03 RX ORDER — MECLIZINE HYDROCHLORIDE 25 MG/1
25 TABLET ORAL ONCE
Status: COMPLETED | OUTPATIENT
Start: 2021-10-03 | End: 2021-10-03

## 2021-10-03 RX ADMIN — MAGNESIUM HYDROXIDE,ALUMINUM HYDROXICE,SIMETHICONE 15 ML: 240; 2400; 2400 SUSPENSION ORAL at 01:01

## 2021-10-03 RX ADMIN — MECLIZINE HYDROCHLORIDE 25 MG: 25 TABLET ORAL at 04:14

## 2021-10-03 RX ADMIN — ONDANSETRON 4 MG: 2 INJECTION INTRAMUSCULAR; INTRAVENOUS at 01:01

## 2021-10-03 RX ADMIN — LIDOCAINE HYDROCHLORIDE 15 ML: 20 SOLUTION ORAL; TOPICAL at 01:01

## 2021-10-03 NOTE — ED PROVIDER NOTES
EMERGENCY DEPARTMENT ENCOUNTER    Room Number:  12/12  Date seen:  10/3/2021  PCP: Provider, No Known  Historian: Patient      HPI:  Chief Complaint: Chest pain  A complete HPI/ROS/PMH/PSH/SH/FH are unobtainable due to: Nothing  Context: Krupa Mcmanus is a 65 y.o. female who presents to the ED c/o chest pain.  Patient reports that the pain started this morning after she ate breakfast.  Upon further questioning and reminding her that she was seen here last night with complaint of chest pain, she then clarifies that it actually began last night after dinner.  Of note, patient had described during her visit last night that it had been present for the last 2 days.  She also reports that she has had some burning with urination and that she was prescribed antifungal medication for a yeast infection.  She reports that the pain is mostly subsided now but when she does have it is substernal, sharp, mild, nonradiating.  She denies associated nausea, vomiting, shortness of breath, diaphoresis.  She denies history of coronary disease.            PAST MEDICAL HISTORY  Active Ambulatory Problems     Diagnosis Date Noted   • Affective psychosis, bipolar (HCC) 07/11/2018   • Essential hypertension 07/12/2018   • Mild intermittent asthma without complication 07/12/2018   • Hypokalemia 07/12/2018   • Leukocytosis 07/12/2018   • Elevated liver enzymes 07/12/2018   • Type 2 diabetes mellitus (HCC) 07/12/2018     Resolved Ambulatory Problems     Diagnosis Date Noted   • No Resolved Ambulatory Problems     Past Medical History:   Diagnosis Date   • Alzheimer disease (CMS/HCC)    • Anxiety    • Bipolar 1 disorder (CMS/HCC)    • Depression    • Diabetes mellitus (CMS/HCC)    • GERD (gastroesophageal reflux disease)    • Rheumatoid arthritis (CMS/HCC)          PAST SURGICAL HISTORY  Past Surgical History:   Procedure Laterality Date   • CHOLECYSTECTOMY     • HYSTERECTOMY           FAMILY HISTORY  No family history on file.      SOCIAL  HISTORY  Social History     Socioeconomic History   • Marital status:      Spouse name: Not on file   • Number of children: Not on file   • Years of education: Not on file   • Highest education level: Not on file         ALLERGIES  Penicillins, Amoxicillin, Benadryl [diphenhydramine], Ceclor [cefaclor], and Zyprexa [olanzapine]        REVIEW OF SYSTEMS  Review of Systems   Review of all 14 systems is negative other than stated in the HPI above.      PHYSICAL EXAM  ED Triage Vitals   Temp Heart Rate Resp BP SpO2   10/03/21 1419 10/03/21 1419 10/03/21 1419 10/03/21 1419 10/03/21 1419   99.1 °F (37.3 °C) 82 16 116/68 96 %      Temp src Heart Rate Source Patient Position BP Location FiO2 (%)   -- 10/03/21 1419 10/03/21 1444 10/03/21 1444 --    Monitor Lying Right arm          GENERAL: Awake and alert, no acute distress  HENT: nares patent  EYES: no scleral icterus  CV: regular rhythm, normal rate  RESPIRATORY: normal effort, lungs clear auscultation bilaterally  ABDOMEN: soft, nondistended, nontender throughout  MUSCULOSKELETAL: no deformity, no lower extremity edema or calf tenderness present bilaterally  NEURO: alert, fully oriented, moves all extremities, follows commands, cranial nerves II through XII grossly intact  PSYCH:  calm, cooperative, poor historian  SKIN: warm, dry, normal to inspection, no rash    Vital signs and nursing notes reviewed.          LAB RESULTS  Recent Results (from the past 24 hour(s))   Comprehensive Metabolic Panel    Collection Time: 10/03/21 12:56 AM    Specimen: Blood   Result Value Ref Range    Glucose 115 (H) 65 - 99 mg/dL    BUN 18 8 - 23 mg/dL    Creatinine 0.75 0.57 - 1.00 mg/dL    Sodium 141 136 - 145 mmol/L    Potassium 4.0 3.5 - 5.2 mmol/L    Chloride 101 98 - 107 mmol/L    CO2 28.0 22.0 - 29.0 mmol/L    Calcium 8.8 8.6 - 10.5 mg/dL    Total Protein 6.2 6.0 - 8.5 g/dL    Albumin 3.70 3.50 - 5.20 g/dL    ALT (SGPT) 30 1 - 33 U/L    AST (SGOT) 29 1 - 32 U/L    Alkaline  Phosphatase 69 39 - 117 U/L    Total Bilirubin 0.2 0.0 - 1.2 mg/dL    eGFR Non African Amer 77 >60 mL/min/1.73    Globulin 2.5 gm/dL    A/G Ratio 1.5 g/dL    BUN/Creatinine Ratio 24.0 7.0 - 25.0    Anion Gap 12.0 5.0 - 15.0 mmol/L   BNP    Collection Time: 10/03/21 12:56 AM    Specimen: Blood   Result Value Ref Range    proBNP 62.4 0.0 - 900.0 pg/mL   Troponin    Collection Time: 10/03/21 12:56 AM    Specimen: Blood   Result Value Ref Range    Troponin T <0.010 0.000 - 0.030 ng/mL   Lipase    Collection Time: 10/03/21 12:56 AM    Specimen: Blood   Result Value Ref Range    Lipase 32 13 - 60 U/L   Magnesium    Collection Time: 10/03/21 12:56 AM    Specimen: Blood   Result Value Ref Range    Magnesium 2.0 1.6 - 2.4 mg/dL   CBC Auto Differential    Collection Time: 10/03/21 12:56 AM    Specimen: Blood   Result Value Ref Range    WBC 8.40 3.40 - 10.80 10*3/mm3    RBC 3.71 (L) 3.77 - 5.28 10*6/mm3    Hemoglobin 10.5 (L) 12.0 - 15.9 g/dL    Hematocrit 32.8 (L) 34.0 - 46.6 %    MCV 88.4 79.0 - 97.0 fL    MCH 28.3 26.6 - 33.0 pg    MCHC 32.0 31.5 - 35.7 g/dL    RDW 14.3 12.3 - 15.4 %    RDW-SD 45.9 37.0 - 54.0 fl    MPV 10.1 6.0 - 12.0 fL    Platelets 270 140 - 450 10*3/mm3    Neutrophil % 56.8 42.7 - 76.0 %    Lymphocyte % 30.6 19.6 - 45.3 %    Monocyte % 9.4 5.0 - 12.0 %    Eosinophil % 2.3 0.3 - 6.2 %    Basophil % 0.7 0.0 - 1.5 %    Immature Grans % 0.2 0.0 - 0.5 %    Neutrophils, Absolute 4.77 1.70 - 7.00 10*3/mm3    Lymphocytes, Absolute 2.57 0.70 - 3.10 10*3/mm3    Monocytes, Absolute 0.79 0.10 - 0.90 10*3/mm3    Eosinophils, Absolute 0.19 0.00 - 0.40 10*3/mm3    Basophils, Absolute 0.06 0.00 - 0.20 10*3/mm3    Immature Grans, Absolute 0.02 0.00 - 0.05 10*3/mm3    nRBC 0.0 0.0 - 0.2 /100 WBC   Urinalysis With Microscopic If Indicated (No Culture) - Urine, Clean Catch    Collection Time: 10/03/21  1:14 AM    Specimen: Urine, Clean Catch   Result Value Ref Range    Color, UA Yellow Yellow, Straw    Appearance, UA Clear  Clear    pH, UA 5.5 5.0 - 8.0    Specific Gravity, UA 1.022 1.005 - 1.030    Glucose, UA Negative Negative    Ketones, UA Trace (A) Negative    Bilirubin, UA Negative Negative    Blood, UA Negative Negative    Protein, UA Negative Negative    Leuk Esterase, UA Negative Negative    Nitrite, UA Negative Negative    Urobilinogen, UA 1.0 E.U./dL 0.2 - 1.0 E.U./dL   Troponin    Collection Time: 10/03/21  3:08 AM    Specimen: Blood   Result Value Ref Range    Troponin T <0.010 0.000 - 0.030 ng/mL   ECG 12 Lead    Collection Time: 10/03/21  2:28 PM   Result Value Ref Range    QT Interval 384 ms   Comprehensive Metabolic Panel    Collection Time: 10/03/21  3:57 PM    Specimen: Blood   Result Value Ref Range    Glucose 101 (H) 65 - 99 mg/dL    BUN 17 8 - 23 mg/dL    Creatinine 0.74 0.57 - 1.00 mg/dL    Sodium 138 136 - 145 mmol/L    Potassium 4.7 3.5 - 5.2 mmol/L    Chloride 102 98 - 107 mmol/L    CO2 28.1 22.0 - 29.0 mmol/L    Calcium 8.9 8.6 - 10.5 mg/dL    Total Protein 6.2 6.0 - 8.5 g/dL    Albumin 3.70 3.50 - 5.20 g/dL    ALT (SGPT) 30 1 - 33 U/L    AST (SGOT) 33 (H) 1 - 32 U/L    Alkaline Phosphatase 70 39 - 117 U/L    Total Bilirubin 0.2 0.0 - 1.2 mg/dL    eGFR Non African Amer 78 >60 mL/min/1.73    Globulin 2.5 gm/dL    A/G Ratio 1.5 g/dL    BUN/Creatinine Ratio 23.0 7.0 - 25.0    Anion Gap 7.9 5.0 - 15.0 mmol/L   BNP    Collection Time: 10/03/21  3:57 PM    Specimen: Blood   Result Value Ref Range    proBNP 112.6 0.0 - 900.0 pg/mL   Troponin    Collection Time: 10/03/21  3:57 PM    Specimen: Blood   Result Value Ref Range    Troponin T <0.010 0.000 - 0.030 ng/mL   Lipase    Collection Time: 10/03/21  3:57 PM    Specimen: Blood   Result Value Ref Range    Lipase 33 13 - 60 U/L   CBC Auto Differential    Collection Time: 10/03/21  3:57 PM    Specimen: Blood   Result Value Ref Range    WBC 6.83 3.40 - 10.80 10*3/mm3    RBC 3.75 (L) 3.77 - 5.28 10*6/mm3    Hemoglobin 10.5 (L) 12.0 - 15.9 g/dL    Hematocrit 33.7 (L) 34.0  - 46.6 %    MCV 89.9 79.0 - 97.0 fL    MCH 28.0 26.6 - 33.0 pg    MCHC 31.2 (L) 31.5 - 35.7 g/dL    RDW 14.4 12.3 - 15.4 %    RDW-SD 47.4 37.0 - 54.0 fl    MPV 10.3 6.0 - 12.0 fL    Platelets 289 140 - 450 10*3/mm3    Neutrophil % 56.4 42.7 - 76.0 %    Lymphocyte % 29.9 19.6 - 45.3 %    Monocyte % 10.7 5.0 - 12.0 %    Eosinophil % 2.0 0.3 - 6.2 %    Basophil % 0.7 0.0 - 1.5 %    Immature Grans % 0.3 0.0 - 0.5 %    Neutrophils, Absolute 3.85 1.70 - 7.00 10*3/mm3    Lymphocytes, Absolute 2.04 0.70 - 3.10 10*3/mm3    Monocytes, Absolute 0.73 0.10 - 0.90 10*3/mm3    Eosinophils, Absolute 0.14 0.00 - 0.40 10*3/mm3    Basophils, Absolute 0.05 0.00 - 0.20 10*3/mm3    Immature Grans, Absolute 0.02 0.00 - 0.05 10*3/mm3    nRBC 0.0 0.0 - 0.2 /100 WBC   D-dimer, Quantitative    Collection Time: 10/03/21  5:36 PM    Specimen: Blood   Result Value Ref Range    D-Dimer, Quantitative 0.30 0.00 - 0.49 MCGFEU/mL       Ordered the above labs and reviewed the results.        RADIOLOGY  XR Chest 1 View    Result Date: 10/3/2021  SINGLE VIEW OF THE CHEST  HISTORY: Chest pain  COMPARISON: 09/26/2021  FINDINGS: Heart size is within normal limits for technique. No pneumothorax, pleural effusion, or acute infiltrate is seen.      No acute findings.  This report was finalized on 10/3/2021 1:09 AM by Dr. Clau Mcmanus M.D.        Ordered the above noted radiological studies. Reviewed by me in PACS.            PROCEDURES  Procedures              MEDICATIONS GIVEN IN ER  Medications - No data to display                MEDICAL DECISION MAKING, PROGRESS, and CONSULTS    All labs have been independently reviewed by me.  All radiology studies have been reviewed by me and discussed with radiologist dictating the report.   EKG's independently viewed and interpreted by me.  Discussion below represents my analysis of pertinent findings related to patient's condition, differential diagnosis, treatment plan and final disposition.      Differential  diagnosis includes but is not limited to:  Acute coronary syndrome  Pericarditis  Pneumonia  Pneumothorax  Pulmonary embolus  GERD  Chest wall pain      ED Course as of Oct 03 2210   Sun Oct 03, 2021   1536 Medical record review: I reviewed the chest x-ray that was obtained this morning around 1 AM.  There is no pleural effusion, no infiltrate.  No acute disease.    [JR]   1537 Medical record review: Patient was seen in the emergency department earlier this morning for chest pain.  She had reassuring EKG and cardiac troponin was negative x2.  Chest x-ray was also normal.    [JR]   1537 EKG          EKG time: 1428  Rhythm/Rate: Sinus rhythm, 69  P waves and OR: Normal  QRS, axis: Normal axis  ST and T waves: No acute ischemic changes    Interpreted Contemporaneously by me, independently viewed  Similar compared to prior this morning at 00 43          [JR]   1618 Patient reports that CT contrast makes her nauseated and sometimes dizzy.  I have suggested that we could obtain a D-dimer to further restratify for pulmonary embolus and if normal will go the CTA chest.  If the D-dimer is strongly abnormal then we will likely need to premedicate her and proceed with CTA for further evaluation.    [JR]   1811 D-Dimer, Quant: 0.30 [JR]   1811 D-dimer is negative therefore CTA chest not indicated for further evaluation of pulmonary embolus.  Patient has had yet another negative cardiac troponin today.  Her pain seems atypical.    [JR]   1811 She is appropriate for discharge home with further evaluation as outpatient by her PCP and return precautions.       [JR]      ED Course User Index  [JR] Canelo Heard MD       HEART score 4 due to age and risk factors including hypertension, diabetes.       I wore an N95 mask, face shield, and gloves during this patient encounter.  Patient also wearing a surgical mask.  Hand hygeine performed before and after seeing the patient.    DIAGNOSIS  Final diagnoses:   Atypical chest pain          DISPOSITION  DISCHARGE    Patient discharged in stable condition.    Reviewed implications of results, diagnosis, meds, responsibility to follow up, warning signs and symptoms of possible worsening, potential complications and reasons to return to ER.    Patient/Family voiced understanding of above instructions.    Discussed plan for discharge, as there is no emergent indication for admission. Patient referred to primary care provider for BP management due to today's BP. Pt/family is agreeable and understands need for follow up and repeat testing.  Pt is aware that discharge does not mean that nothing is wrong but it indicates no emergency is present that requires admission and they must continue care with follow-up as given below or physician of their choice.     FOLLOW-UP  PATIENT CONNECTION - [facility] [id number] [phone]  Call in 1 day           Medication List      No changes were made to your prescriptions during this visit.                   Latest Documented Vital Signs:  As of 22:10 EDT  BP- 109/43 HR- 58 Temp- 99.1 °F (37.3 °C) O2 sat- 96%        --    Please note that portions of this were completed with a voice recognition program.          Canelo Heard MD  10/03/21 1122

## 2021-10-03 NOTE — ED PROVIDER NOTES
EMERGENCY DEPARTMENT ENCOUNTER    Room Number:  07/07  Date of encounter:  10/3/2021  PCP: Provider, No Known  Historian: Patient      HPI:  Chief Complaint: Chest pain      Context: Krupa Mcmanus is a 65 y.o. female with past medical history of bipolar disorder, dementia, anxiety and depression who presents to the ED c/o chest pain.  Patient states that her symptoms started on Friday, is described as sharp, does not radiate, not worse with exertion or deep breath.  It has been associated with nausea, vomiting, and sweats.  Patient also thinks that she may have a UTI or yeast infection as she has had some dysuria for the past 4 to 5 days.      PAST MEDICAL HISTORY  Active Ambulatory Problems     Diagnosis Date Noted   • Affective psychosis, bipolar (HCC) 07/11/2018   • Essential hypertension 07/12/2018   • Mild intermittent asthma without complication 07/12/2018   • Hypokalemia 07/12/2018   • Leukocytosis 07/12/2018   • Elevated liver enzymes 07/12/2018   • Type 2 diabetes mellitus ([facility]) 07/12/2018     Resolved Ambulatory Problems     Diagnosis Date Noted   • No Resolved Ambulatory Problems     Past Medical History:   Diagnosis Date   • Alzheimer disease (CMS/[facility])    • Anxiety    • Bipolar 1 disorder (CMS/[facility])    • Depression    • Diabetes mellitus (CMS/[facility])    • GERD (gastroesophageal reflux disease)    • Rheumatoid arthritis (CMS/[facility])          PAST SURGICAL HISTORY  Past Surgical History:   Procedure Laterality Date   • CHOLECYSTECTOMY     • HYSTERECTOMY           FAMILY HISTORY  No family history on file.      SOCIAL HISTORY  Social History     Socioeconomic History   • Marital status:      Spouse name: Not on file   • Number of children: Not on file   • Years of education: Not on file   • Highest education level: Not on file         ALLERGIES  Penicillins, Amoxicillin, Ceclor [cefaclor], and Zyprexa [olanzapine]        REVIEW OF SYSTEMS  Review of Systems     All systems reviewed and negative except  for those discussed in HPI.       PHYSICAL EXAM    I have reviewed the triage vital signs and nursing notes.    ED Triage Vitals   Temp Pulse Resp BP SpO2   -- -- -- -- --      Temp src Heart Rate Source Patient Position BP Location FiO2 (%)   -- -- -- -- --       Physical Exam  GENERAL: Alert and oriented x3, obese, not distressed  HENT: nares patent  EYES: no scleral icterus  CV: regular rhythm, regular rate  RESPIRATORY: normal effort   ABDOMEN: soft  MUSCULOSKELETAL: no deformity  NEURO: alert, moves all extremities,, no focal neuro deficits, follows commands  SKIN: warm, dry, no rashes        LAB RESULTS  Recent Results (from the past 24 hour(s))   Comprehensive Metabolic Panel    Collection Time: 10/03/21 12:56 AM    Specimen: Blood   Result Value Ref Range    Glucose 115 (H) 65 - 99 mg/dL    BUN 18 8 - 23 mg/dL    Creatinine 0.75 0.57 - 1.00 mg/dL    Sodium 141 136 - 145 mmol/L    Potassium 4.0 3.5 - 5.2 mmol/L    Chloride 101 98 - 107 mmol/L    CO2 28.0 22.0 - 29.0 mmol/L    Calcium 8.8 8.6 - 10.5 mg/dL    Total Protein 6.2 6.0 - 8.5 g/dL    Albumin 3.70 3.50 - 5.20 g/dL    ALT (SGPT) 30 1 - 33 U/L    AST (SGOT) 29 1 - 32 U/L    Alkaline Phosphatase 69 39 - 117 U/L    Total Bilirubin 0.2 0.0 - 1.2 mg/dL    eGFR Non African Amer 77 >60 mL/min/1.73    Globulin 2.5 gm/dL    A/G Ratio 1.5 g/dL    BUN/Creatinine Ratio 24.0 7.0 - 25.0    Anion Gap 12.0 5.0 - 15.0 mmol/L   BNP    Collection Time: 10/03/21 12:56 AM    Specimen: Blood   Result Value Ref Range    proBNP 62.4 0.0 - 900.0 pg/mL   Troponin    Collection Time: 10/03/21 12:56 AM    Specimen: Blood   Result Value Ref Range    Troponin T <0.010 0.000 - 0.030 ng/mL   Lipase    Collection Time: 10/03/21 12:56 AM    Specimen: Blood   Result Value Ref Range    Lipase 32 13 - 60 U/L   Magnesium    Collection Time: 10/03/21 12:56 AM    Specimen: Blood   Result Value Ref Range    Magnesium 2.0 1.6 - 2.4 mg/dL   CBC Auto Differential    Collection Time: 10/03/21  12:56 AM    Specimen: Blood   Result Value Ref Range    WBC 8.40 3.40 - 10.80 10*3/mm3    RBC 3.71 (L) 3.77 - 5.28 10*6/mm3    Hemoglobin 10.5 (L) 12.0 - 15.9 g/dL    Hematocrit 32.8 (L) 34.0 - 46.6 %    MCV 88.4 79.0 - 97.0 fL    MCH 28.3 26.6 - 33.0 pg    MCHC 32.0 31.5 - 35.7 g/dL    RDW 14.3 12.3 - 15.4 %    RDW-SD 45.9 37.0 - 54.0 fl    MPV 10.1 6.0 - 12.0 fL    Platelets 270 140 - 450 10*3/mm3    Neutrophil % 56.8 42.7 - 76.0 %    Lymphocyte % 30.6 19.6 - 45.3 %    Monocyte % 9.4 5.0 - 12.0 %    Eosinophil % 2.3 0.3 - 6.2 %    Basophil % 0.7 0.0 - 1.5 %    Immature Grans % 0.2 0.0 - 0.5 %    Neutrophils, Absolute 4.77 1.70 - 7.00 10*3/mm3    Lymphocytes, Absolute 2.57 0.70 - 3.10 10*3/mm3    Monocytes, Absolute 0.79 0.10 - 0.90 10*3/mm3    Eosinophils, Absolute 0.19 0.00 - 0.40 10*3/mm3    Basophils, Absolute 0.06 0.00 - 0.20 10*3/mm3    Immature Grans, Absolute 0.02 0.00 - 0.05 10*3/mm3    nRBC 0.0 0.0 - 0.2 /100 WBC   Urinalysis With Microscopic If Indicated (No Culture) - Urine, Clean Catch    Collection Time: 10/03/21  1:14 AM    Specimen: Urine, Clean Catch   Result Value Ref Range    Color, UA Yellow Yellow, Straw    Appearance, UA Clear Clear    pH, UA 5.5 5.0 - 8.0    Specific Gravity, UA 1.022 1.005 - 1.030    Glucose, UA Negative Negative    Ketones, UA Trace (A) Negative    Bilirubin, UA Negative Negative    Blood, UA Negative Negative    Protein, UA Negative Negative    Leuk Esterase, UA Negative Negative    Nitrite, UA Negative Negative    Urobilinogen, UA 1.0 E.U./dL 0.2 - 1.0 E.U./dL   Troponin    Collection Time: 10/03/21  3:08 AM    Specimen: Blood   Result Value Ref Range    Troponin T <0.010 0.000 - 0.030 ng/mL       Ordered the above labs and independently reviewed the results.        RADIOLOGY  XR Chest 1 View    Result Date: 10/3/2021  SINGLE VIEW OF THE CHEST  HISTORY: Chest pain  COMPARISON: 09/26/2021  FINDINGS: Heart size is within normal limits for technique. No pneumothorax, pleural  effusion, or acute infiltrate is seen.      No acute findings.  This report was finalized on 10/3/2021 1:09 AM by Dr. Clau Mcmanus M.D.        I ordered the above noted radiological studies. Reviewed by me and discussed with radiologist.  See dictation for official radiology interpretation.      PROCEDURES    Procedures      MEDICATIONS GIVEN IN ER    Medications   sodium chloride 0.9 % flush 10 mL (has no administration in time range)   aluminum-magnesium hydroxide-simethicone (MAALOX MAX) 400-400-40 MG/5ML suspension 15 mL (15 mL Oral Given 10/3/21 0101)   Lidocaine Viscous HCl (XYLOCAINE) 2 % mouth solution 15 mL (15 mL Mouth/Throat Given 10/3/21 0101)   ondansetron (ZOFRAN) injection 4 mg (4 mg Intravenous Given 10/3/21 0101)   meclizine (ANTIVERT) tablet 25 mg (25 mg Oral Given 10/3/21 0414)         PROGRESS, DATA ANALYSIS, CONSULTS, AND MEDICAL DECISION MAKING    All labs have been independently reviewed by me.  All radiology studies have been reviewed by me and discussed with radiologist dictating the report.   EKG's independently viewed and interpreted by me.  Discussion below represents my analysis of pertinent findings related to patient's condition, differential diagnosis, treatment plan and final disposition.    DDx: Includes but not limited to ACS, pneumonia, CHF, COPD, gastritis, GERD, esophagitis    ED Course as of Oct 03 0557   Sun Oct 03, 2021   0059 EKG          EKG time: 00:43  Rhythm/Rate: Sinus rhythm at 62 bpm  P waves and ID: Normal  QRS, axis: Normal  ST and T waves: No acute ST/T wave changes    Interpreted Contemporaneously by me, independently viewed  Unchanged compared to prior EKG of 9/26/2021.      [FRANC]   0425 WBC: 8.40 [FRANC]   0429 Hemoglobin(!): 10.5 [FRANC]   0429 Platelets: 270 [FRANC]   0430 Glucose(!): 115 [FRANC]   0430 BUN: 18 [FRANC]   0430 Creatinine: 0.75 [FRANC]   0430 Sodium: 141 [FRANC]   0430 Potassium: 4.0 [FRANC]   0430 Troponin T: <0.010 [FRANC]   0430 proBNP: 62.4 [FRANC]   0430 Patient  rechecked, resting comfortably bed, no acute distress.  Symptoms improved.  Discussed results, diagnosis, and treatment plan.  Patient and spouse expressed understanding and agree with plan.    [FRANC]      ED Course User Index  [FRANC] Chris Godoy PA       MDM: Patient has a very atypical presentation and her evaluation shows a normal EKG 2 normal troponins, BNP, lipase, electrolytes, normal chest x-ray, and a normal urine.  Her symptoms have improved with GI cocktail, vital signs are stable, patient is safe for discharge home.    PPE: The patient wore a surgical mask throughout the entire patient encounter. I wore an N95.    AS OF 05:57 EDT VITALS:    BP - 102/61  HR - 67  TEMP - 97.6 °F (36.4 °C) (Oral)  O2 SATS - 96%        DIAGNOSIS  Final diagnoses:   Atypical chest pain         DISPOSITION  DISCHARGE    Patient discharged in stable condition.    Reviewed implications of results, diagnosis, meds, responsibility to follow up, warning signs and symptoms of possible worsening, potential complications and reasons to return to ER.    Patient/Family voiced understanding of above instructions.    Discussed plan for discharge, as there is no emergent indication for admission. Patient referred to primary care provider for BP management due to today's BP. Pt/family is agreeable and understands need for follow up and repeat testing.  Pt is aware that discharge does not mean that nothing is wrong but it indicates no emergency is present that requires admission and they must continue care with follow-up as given below or physician of their choice.     FOLLOW-UP  PATIENT CONNECTION - [facility] [address]  [phone]  Schedule an appointment as soon as possible for a visit in 1 week           Medication List      New Prescriptions    fluconazole 150 MG tablet  Commonly known as: DIFLUCAN  Take 1 tablet by mouth 1 (One) Time for 1 dose.           Where to Get Your Medications      These medications were  sent to [facility] - [facility] [address] [facility] AT [facility] & ([address]) - [phone]  - [phone]   [address] [place] [address], [facility] [address]    Phone: [phone]   · fluconazole 150 MG tablet                    Chris Godoy PA  10/03/21 0446       Chris Godoy PA  10/03/21 0812

## 2021-10-03 NOTE — ED NOTES
This RN wearing all appropriate PPE during patient encounter. Hand hygiene performed before and during entering room.       Yasmin Parker, SHAHRIAR  10/03/21 0784

## 2021-10-03 NOTE — ED TRIAGE NOTES
Pt reports chest pain and dizziness that started this morning after breakfast.     Pt was wearing a mask during assessment.  This RN wore appropriate PPE

## 2021-10-03 NOTE — ED NOTES
This RN wearing all appropriate PPE during patient encounter. Hand hygiene performed before and during entering room.       Yasmin Parker, SHAHRIAR  10/03/21 5350

## 2021-10-03 NOTE — DISCHARGE INSTRUCTIONS
Home, rest, home medicine as prescribed, follow up with PCP for recheck. Return to care with further concerns.      General

## 2021-10-03 NOTE — ED PROVIDER NOTES
The JOSE and I have discussed this patients history, physical exam, and treatment plan. I have reviewed the documentation and personally had a face to face interaction with the patient. I affirm the documentation and agree with the treatment and plan.  The following note describes my personal findings    This patient is a 65-year-old female presenting to the emergency room today secondary to chest discomfort.  She describes the discomfort as a sharp sensation that is throughout her chest and nonradiating.  Symptoms have been ongoing for the past 2 days.  She also complains of some dysuria for the past several days as well.    Exam: This patient is resting comfortably and in no distress, without gross neurological deficit.  She is afebrile with stable vital signs and nontoxic in appearance.  Cardiovascular exam shows a regular rate and rhythm, without murmur.  Lungs are clear bilaterally.    Plan: We will obtain an EKG, labs as well as a urinalysis, as well as a chest x-ray in the emergency department.  We will monitor and reassess following.      The patient was wearing a facemask upon entrance into the room and remained in such throughout their visit.  I was wearing PPE including a facemask, eye protection, as well as gloves at any point entering the room and throughout the visit     Keo Santamaria MD  10/03/21 1840

## 2021-10-03 NOTE — ED NOTES
This RN wearing all appropriate PPE during patient encounter. Hand hygiene performed before and during entering room.       Yasmin Parker, SHAHRIAR  10/03/21 6367

## 2021-10-03 NOTE — ED NOTES
Pt to ER per EMS from home. Pt in mask, staff in PPE. Pt reports heartburn since 1800 tonight, tried LEE ANN without relief. Took her nausea medication but it didn't help. Pt also reports burning urination.     Vira Gibson, RN  10/02/21 9355

## 2021-10-03 NOTE — ED NOTES
This RN wearing all appropriate PPE during patient encounter. Hand hygiene performed before and during entering room.       Yasmin Parker, RN  10/03/21 8650

## 2021-10-04 LAB — QT INTERVAL: 384 MS

## 2021-10-05 PROCEDURE — 93005 ELECTROCARDIOGRAM TRACING: CPT

## 2021-10-05 PROCEDURE — 99283 EMERGENCY DEPT VISIT LOW MDM: CPT

## 2021-10-05 PROCEDURE — 93010 ELECTROCARDIOGRAM REPORT: CPT | Performed by: INTERNAL MEDICINE

## 2021-10-06 ENCOUNTER — APPOINTMENT (OUTPATIENT)
Dept: GENERAL RADIOLOGY | Facility: HOSPITAL | Age: 66
End: 2021-10-06

## 2021-10-06 ENCOUNTER — HOSPITAL ENCOUNTER (EMERGENCY)
Facility: HOSPITAL | Age: 66
Discharge: HOME | End: 2021-10-06
Attending: EMERGENCY MEDICINE | Admitting: EMERGENCY MEDICINE

## 2021-10-06 VITALS
DIASTOLIC BLOOD PRESSURE: 68 MMHG | TEMPERATURE: 98 F | HEIGHT: 66 IN | RESPIRATION RATE: 16 BRPM | BODY MASS INDEX: 36.38 KG/M2 | SYSTOLIC BLOOD PRESSURE: 116 MMHG | HEART RATE: 68 BPM | OXYGEN SATURATION: 98 %

## 2021-10-06 DIAGNOSIS — R07.9 CHEST PAIN, UNSPECIFIED TYPE: Primary | ICD-10-CM

## 2021-10-06 LAB
ALBUMIN SERPL-MCNC: 4 G/DL (ref 3.5–5.2)
ALBUMIN/GLOB SERPL: 1.5 G/DL
ALP SERPL-CCNC: 78 U/L (ref 39–117)
ALT SERPL W P-5'-P-CCNC: 33 U/L (ref 1–33)
ANION GAP SERPL CALCULATED.3IONS-SCNC: 10.7 MMOL/L (ref 5–15)
AST SERPL-CCNC: 32 U/L (ref 1–32)
BASOPHILS # BLD AUTO: 0.06 10*3/MM3 (ref 0–0.2)
BASOPHILS NFR BLD AUTO: 0.6 % (ref 0–1.5)
BILIRUB SERPL-MCNC: 0.2 MG/DL (ref 0–1.2)
BUN SERPL-MCNC: 16 MG/DL (ref 8–23)
BUN/CREAT SERPL: 23.9 (ref 7–25)
CALCIUM SPEC-SCNC: 9.5 MG/DL (ref 8.6–10.5)
CHLORIDE SERPL-SCNC: 103 MMOL/L (ref 98–107)
CO2 SERPL-SCNC: 26.3 MMOL/L (ref 22–29)
CREAT SERPL-MCNC: 0.67 MG/DL (ref 0.57–1)
DEPRECATED RDW RBC AUTO: 46 FL (ref 37–54)
EOSINOPHIL # BLD AUTO: 0.2 10*3/MM3 (ref 0–0.4)
EOSINOPHIL NFR BLD AUTO: 2.1 % (ref 0.3–6.2)
ERYTHROCYTE [DISTWIDTH] IN BLOOD BY AUTOMATED COUNT: 14.5 % (ref 12.3–15.4)
GFR SERPL CREATININE-BSD FRML MDRD: 88 ML/MIN/1.73
GLOBULIN UR ELPH-MCNC: 2.7 GM/DL
GLUCOSE SERPL-MCNC: 128 MG/DL (ref 65–99)
HCT VFR BLD AUTO: 36.4 % (ref 34–46.6)
HGB BLD-MCNC: 11.4 G/DL (ref 12–15.9)
HOLD SPECIMEN: NORMAL
HOLD SPECIMEN: NORMAL
IMM GRANULOCYTES # BLD AUTO: 0.02 10*3/MM3 (ref 0–0.05)
IMM GRANULOCYTES NFR BLD AUTO: 0.2 % (ref 0–0.5)
LYMPHOCYTES # BLD AUTO: 2.52 10*3/MM3 (ref 0.7–3.1)
LYMPHOCYTES NFR BLD AUTO: 26.5 % (ref 19.6–45.3)
MCH RBC QN AUTO: 27.7 PG (ref 26.6–33)
MCHC RBC AUTO-ENTMCNC: 31.3 G/DL (ref 31.5–35.7)
MCV RBC AUTO: 88.3 FL (ref 79–97)
MONOCYTES # BLD AUTO: 0.81 10*3/MM3 (ref 0.1–0.9)
MONOCYTES NFR BLD AUTO: 8.5 % (ref 5–12)
NEUTROPHILS NFR BLD AUTO: 5.89 10*3/MM3 (ref 1.7–7)
NEUTROPHILS NFR BLD AUTO: 62.1 % (ref 42.7–76)
NRBC BLD AUTO-RTO: 0 /100 WBC (ref 0–0.2)
NT-PROBNP SERPL-MCNC: 111.1 PG/ML (ref 0–900)
PLATELET # BLD AUTO: 316 10*3/MM3 (ref 140–450)
PMV BLD AUTO: 10 FL (ref 6–12)
POTASSIUM SERPL-SCNC: 4.2 MMOL/L (ref 3.5–5.2)
PROT SERPL-MCNC: 6.7 G/DL (ref 6–8.5)
QT INTERVAL: 355 MS
RBC # BLD AUTO: 4.12 10*6/MM3 (ref 3.77–5.28)
SODIUM SERPL-SCNC: 140 MMOL/L (ref 136–145)
TROPONIN T SERPL-MCNC: <0.01 NG/ML (ref 0–0.03)
WBC # BLD AUTO: 9.5 10*3/MM3 (ref 3.4–10.8)
WHOLE BLOOD HOLD SPECIMEN: NORMAL
WHOLE BLOOD HOLD SPECIMEN: NORMAL

## 2021-10-06 PROCEDURE — 71046 X-RAY EXAM CHEST 2 VIEWS: CPT

## 2021-10-06 PROCEDURE — 84484 ASSAY OF TROPONIN QUANT: CPT

## 2021-10-06 PROCEDURE — 80053 COMPREHEN METABOLIC PANEL: CPT

## 2021-10-06 PROCEDURE — 83880 ASSAY OF NATRIURETIC PEPTIDE: CPT | Performed by: EMERGENCY MEDICINE

## 2021-10-06 PROCEDURE — 85025 COMPLETE CBC W/AUTO DIFF WBC: CPT

## 2021-10-06 RX ORDER — SODIUM CHLORIDE 0.9 % (FLUSH) 0.9 %
10 SYRINGE (ML) INJECTION AS NEEDED
Status: DISCONTINUED | OUTPATIENT
Start: 2021-10-06 | End: 2021-10-06 | Stop reason: HOSPADM

## 2021-10-06 RX ORDER — ASPIRIN 325 MG
325 TABLET ORAL ONCE
Status: DISCONTINUED | OUTPATIENT
Start: 2021-10-06 | End: 2021-10-06

## 2021-10-06 NOTE — ED PROVIDER NOTES
EMERGENCY DEPARTMENT ENCOUNTER    Room Number:  22/22  Date of encounter:  10/6/2021  PCP: Provider, No Known  Patient Care Team:  Provider, No Known as PCP - General   Historian: Patient    HPI:  Chief Complaint: Chest pain  A complete HPI/ROS/PMH/PSH/SH/FH are unobtainable due to: Nothing    Context: Krupa Mcmanus is a 65 y.o. female who presents to the ED c/o chest pain.  She reports that it started yesterday afternoon.  She states that it comes and goes.  Is associated with nausea and lightheadedness.  She denies room spinning dizziness.  She denies prior similar episodes however prior records show that she has been here 3 times in the last 2 weeks for chest pain.  She states she has not followed up with her primary care doctor but has an appointment on Friday.  She states she has not followed up with her GI doctor.  She states she has 2 GI doctors and they cannot see her until November.  Her pain does not radiate.  She states it always starts after eating.  She states she thinks it is related to acid reflux.    Prior record review: ER visit 10/3/2021, a second ER visit 10/3/2021, third ER visit 9/26/2021 for chest pain.  All with negative work-ups.  She has been evaluated for PE as well.    PAST MEDICAL HISTORY  Active Ambulatory Problems     Diagnosis Date Noted   • Affective psychosis, bipolar (HCC) 07/11/2018   • Essential hypertension 07/12/2018   • Mild intermittent asthma without complication 07/12/2018   • Hypokalemia 07/12/2018   • Leukocytosis 07/12/2018   • Elevated liver enzymes 07/12/2018   • Type 2 diabetes mellitus (HCC) 07/12/2018     Resolved Ambulatory Problems     Diagnosis Date Noted   • No Resolved Ambulatory Problems     Past Medical History:   Diagnosis Date   • Alzheimer disease (CMS/HCC)    • Anxiety    • Bipolar 1 disorder (CMS/HCC)    • Depression    • Diabetes mellitus (CMS/HCC)    • GERD (gastroesophageal reflux disease)    • Rheumatoid arthritis (CMS/HCC)        The patient has  a COVID HM Topic on their chart, and they are fully vaccinated.    PAST SURGICAL HISTORY  Past Surgical History:   Procedure Laterality Date   • CHOLECYSTECTOMY     • HYSTERECTOMY           FAMILY HISTORY  No family history on file.      SOCIAL HISTORY  Social History     Socioeconomic History   • Marital status:      Spouse name: Not on file   • Number of children: Not on file   • Years of education: Not on file   • Highest education level: Not on file         ALLERGIES  Penicillins, Amoxicillin, Benadryl [diphenhydramine], Ceclor [cefaclor], and Zyprexa [olanzapine]        REVIEW OF SYSTEMS  Review of Systems   Positive chest pain, negative shortness of breath, positive nausea, positive lightheadedness, negative fever, negative chills, negative cough  All systems reviewed and negative except for those discussed in HPI.       PHYSICAL EXAM    I have reviewed the triage vital signs and nursing notes.    ED Triage Vitals [10/05/21 5335]   Temp Heart Rate Resp BP SpO2   98.4 °F (36.9 °C) 105 16 -- 96 %      Temp src Heart Rate Source Patient Position BP Location FiO2 (%)   Temporal Monitor -- -- --       Physical Exam  GENERAL: Awake, alert, no acute distress  SKIN: Warm, dry  HENT: Normocephalic, atraumatic  EYES: no scleral icterus  CV: regular rhythm, regular rate  RESPIRATORY: normal effort, lungs clear  ABDOMEN: soft, nontender, nondistended  MUSCULOSKELETAL: no deformity, no calf tenderness or swelling  NEURO: alert, moves all extremities, follows commands          LAB RESULTS  Recent Results (from the past 24 hour(s))   ECG 12 Lead    Collection Time: 10/05/21 10:59 PM   Result Value Ref Range    QT Interval 355 ms   Comprehensive Metabolic Panel    Collection Time: 10/06/21 12:32 AM    Specimen: Blood   Result Value Ref Range    Glucose 128 (H) 65 - 99 mg/dL    BUN 16 8 - 23 mg/dL    Creatinine 0.67 0.57 - 1.00 mg/dL    Sodium 140 136 - 145 mmol/L    Potassium 4.2 3.5 - 5.2 mmol/L    Chloride 103 98 -  107 mmol/L    CO2 26.3 22.0 - 29.0 mmol/L    Calcium 9.5 8.6 - 10.5 mg/dL    Total Protein 6.7 6.0 - 8.5 g/dL    Albumin 4.00 3.50 - 5.20 g/dL    ALT (SGPT) 33 1 - 33 U/L    AST (SGOT) 32 1 - 32 U/L    Alkaline Phosphatase 78 39 - 117 U/L    Total Bilirubin 0.2 0.0 - 1.2 mg/dL    eGFR Non African Amer 88 >60 mL/min/1.73    Globulin 2.7 gm/dL    A/G Ratio 1.5 g/dL    BUN/Creatinine Ratio 23.9 7.0 - 25.0    Anion Gap 10.7 5.0 - 15.0 mmol/L   Troponin    Collection Time: 10/06/21 12:32 AM    Specimen: Blood   Result Value Ref Range    Troponin T <0.010 0.000 - 0.030 ng/mL   Green Top (Gel)    Collection Time: 10/06/21 12:32 AM   Result Value Ref Range    Extra Tube Hold for add-ons.    Lavender Top    Collection Time: 10/06/21 12:32 AM   Result Value Ref Range    Extra Tube hold for add-on    Gold Top - SST    Collection Time: 10/06/21 12:32 AM   Result Value Ref Range    Extra Tube Hold for add-ons.    Light Blue Top    Collection Time: 10/06/21 12:32 AM   Result Value Ref Range    Extra Tube hold for add-on    CBC Auto Differential    Collection Time: 10/06/21 12:32 AM    Specimen: Blood   Result Value Ref Range    WBC 9.50 3.40 - 10.80 10*3/mm3    RBC 4.12 3.77 - 5.28 10*6/mm3    Hemoglobin 11.4 (L) 12.0 - 15.9 g/dL    Hematocrit 36.4 34.0 - 46.6 %    MCV 88.3 79.0 - 97.0 fL    MCH 27.7 26.6 - 33.0 pg    MCHC 31.3 (L) 31.5 - 35.7 g/dL    RDW 14.5 12.3 - 15.4 %    RDW-SD 46.0 37.0 - 54.0 fl    MPV 10.0 6.0 - 12.0 fL    Platelets 316 140 - 450 10*3/mm3    Neutrophil % 62.1 42.7 - 76.0 %    Lymphocyte % 26.5 19.6 - 45.3 %    Monocyte % 8.5 5.0 - 12.0 %    Eosinophil % 2.1 0.3 - 6.2 %    Basophil % 0.6 0.0 - 1.5 %    Immature Grans % 0.2 0.0 - 0.5 %    Neutrophils, Absolute 5.89 1.70 - 7.00 10*3/mm3    Lymphocytes, Absolute 2.52 0.70 - 3.10 10*3/mm3    Monocytes, Absolute 0.81 0.10 - 0.90 10*3/mm3    Eosinophils, Absolute 0.20 0.00 - 0.40 10*3/mm3    Basophils, Absolute 0.06 0.00 - 0.20 10*3/mm3    Immature Grans,  Absolute 0.02 0.00 - 0.05 10*3/mm3    nRBC 0.0 0.0 - 0.2 /100 WBC       Ordered the above labs and independently reviewed the results.        RADIOLOGY  XR Chest 2 View    Result Date: 10/6/2021  PA AND LATERAL CHEST RADIOGRAPH  HISTORY: Chest pain  COMPARISON: 10/03/2021  FINDINGS: Heart size is within normal limits. Lungs appear clear. No pneumothorax, pleural effusion, or acute infiltrate is seen.      No acute findings.  This report was finalized on 10/6/2021 1:09 AM by Dr. Clau Mcmanus M.D.        I ordered the above noted radiological studies. Reviewed by me and discussed with radiologist.  See dictation for official radiology interpretation.      PROCEDURES    Procedures      MEDICATIONS GIVEN IN ER    Medications   sodium chloride 0.9 % flush 10 mL (has no administration in time range)   aspirin tablet 325 mg (has no administration in time range)         PROGRESS, DATA ANALYSIS, CONSULTS, AND MEDICAL DECISION MAKING    All labs have been independently reviewed by me.  All radiology studies have been reviewed by me and discussed with radiologist dictating the report.   EKG's independently viewed and interpreted by me.  Discussion below represents my analysis of pertinent findings related to patient's condition, differential diagnosis, treatment plan and final disposition.    Differential diagnosis includes but is not limited to PE, non-STEMI, STEMI, pneumothorax, unstable angina, GERD, gastritis, musculoskeletal pain.    ED Course as of Oct 06 0202   Wed Oct 06, 2021   0105 EKG          EKG time: 2259  Rhythm/Rate: Normal sinus, rate 87  P waves and TN: Normal P, normal TN  QRS, axis: Narrow QRS, normal axis  ST and T waves: Normal ST and T waves    Interpreted Contemporaneously by me, independently viewed  Not significantly changed compared to prior 10/3/2021      [TR]   0106 Her cardiac work-up is again negative.  She has follow-up with her primary care doctor on Friday.  I will also refer her to  cardiology although suspect that her symptoms are more related to GI.    [TR]   0202 Heart Score Calculation:History slightly suspicious: 0History moderately suspicious: +1History highly suspicious: +2EKG normal: 0EKG nonspecific repolarization disturbance: +1EKG significant ST deviation: +2Age less than 45: 0Age 45-64: +1Age greater than 65: +2No known risk factors: 01-2 risk factors: +1Greater than 3 risk factors: +2Initial troponin less than the normal limit: 0Initial troponin I-III times normal limit: +1Initial troponin greater than 3 times normal limit: +2Total score: 3    [TR]      ED Course User Index  [TR] Rudy Dominguez MD           PPE: The patient wore a mask and I wore a CAPR mask throughout the entire patient encounter.       AS OF 02:02 EDT VITALS:    BP - 121/69  HR - 61  TEMP - 98.4 °F (36.9 °C) (Temporal)  O2 SATS - 96%        DIAGNOSIS  Final diagnoses:   Chest pain, unspecified type         DISPOSITION  ED Disposition     ED Disposition Condition Comment    Discharge Stable                 Rudy Dominguez MD  10/06/21 0202

## 2021-10-06 NOTE — ED NOTES
I wore full protective equipment throughout this patient encounter including a face mask, goggles, and gloves. Hand hygiene was performed before donning protective equipment and after removal when leaving the room.       Feli Smallwood RN  10/06/21 0028     Aleja Delgadillo(NP)

## 2021-10-06 NOTE — ED NOTES
Pt arrives from home via PV with c/o chest pain, ringing in her ear, nausea, and dizziness since this afternoon. Pt a&ox4, abc's intact, NAD noted, ambulatory to triage.     Patient wearing mask during triage. RN wearing appropriate PPE during triage. Hand hygiene performed.       Paige Florez, RN  10/05/21 4020

## 2021-10-07 ENCOUNTER — HOSPITAL ENCOUNTER (EMERGENCY)
Facility: HOSPITAL | Age: 66
Discharge: LEFT BEFORE DISCHARGE | End: 2021-10-07

## 2021-10-07 ENCOUNTER — APPOINTMENT (OUTPATIENT)
Dept: GENERAL RADIOLOGY | Facility: HOSPITAL | Age: 66
End: 2021-10-07

## 2021-10-07 VITALS — RESPIRATION RATE: 18 BRPM | TEMPERATURE: 97.8 F | OXYGEN SATURATION: 97 % | HEART RATE: 70 BPM

## 2021-10-07 LAB — QT INTERVAL: 425 MS

## 2021-10-07 PROCEDURE — 93005 ELECTROCARDIOGRAM TRACING: CPT

## 2021-10-07 PROCEDURE — 99211 OFF/OP EST MAY X REQ PHY/QHP: CPT

## 2021-10-07 PROCEDURE — 93010 ELECTROCARDIOGRAM REPORT: CPT | Performed by: INTERNAL MEDICINE

## 2021-10-07 RX ORDER — SODIUM CHLORIDE 0.9 % (FLUSH) 0.9 %
10 SYRINGE (ML) INJECTION AS NEEDED
Status: DISCONTINUED | OUTPATIENT
Start: 2021-10-07 | End: 2021-10-07 | Stop reason: HOSPADM

## 2021-10-07 NOTE — ED NOTES
All triage performed with this RN wearing appropriate PPE.  Pt placed in mask upon arrival to ED.  Patient to ED with c/o CP, SOA, and nausea since last night. She is fully vaccinated.      Narda Caldwell, RN  10/07/21 0946

## 2021-10-09 ENCOUNTER — APPOINTMENT (OUTPATIENT)
Dept: GENERAL RADIOLOGY | Facility: HOSPITAL | Age: 66
End: 2021-10-09

## 2021-10-09 ENCOUNTER — HOSPITAL ENCOUNTER (EMERGENCY)
Facility: HOSPITAL | Age: 66
Discharge: HOME | End: 2021-10-09
Attending: EMERGENCY MEDICINE | Admitting: EMERGENCY MEDICINE

## 2021-10-09 VITALS
SYSTOLIC BLOOD PRESSURE: 147 MMHG | OXYGEN SATURATION: 97 % | HEART RATE: 94 BPM | DIASTOLIC BLOOD PRESSURE: 84 MMHG | TEMPERATURE: 97.8 F | RESPIRATION RATE: 18 BRPM

## 2021-10-09 DIAGNOSIS — T17.320A CHOKING DUE TO FOOD (REGURGITATED), INITIAL ENCOUNTER: Primary | ICD-10-CM

## 2021-10-09 LAB
GLUCOSE BLDC GLUCOMTR-MCNC: 158 MG/DL (ref 70–130)
SARS-COV-2 RNA PNL SPEC NAA+PROBE: NOT DETECTED

## 2021-10-09 PROCEDURE — 63710000001 ONDANSETRON ODT 4 MG TABLET DISPERSIBLE: Performed by: PHYSICIAN ASSISTANT

## 2021-10-09 PROCEDURE — 73030 X-RAY EXAM OF SHOULDER: CPT

## 2021-10-09 PROCEDURE — 82962 GLUCOSE BLOOD TEST: CPT

## 2021-10-09 PROCEDURE — 99283 EMERGENCY DEPT VISIT LOW MDM: CPT

## 2021-10-09 PROCEDURE — 87635 SARS-COV-2 COVID-19 AMP PRB: CPT | Performed by: EMERGENCY MEDICINE

## 2021-10-09 PROCEDURE — 71046 X-RAY EXAM CHEST 2 VIEWS: CPT

## 2021-10-09 PROCEDURE — 70360 X-RAY EXAM OF NECK: CPT

## 2021-10-09 RX ORDER — ONDANSETRON 4 MG/1
4 TABLET, ORALLY DISINTEGRATING ORAL ONCE
Status: COMPLETED | OUTPATIENT
Start: 2021-10-09 | End: 2021-10-09

## 2021-10-09 RX ADMIN — ONDANSETRON 4 MG: 4 TABLET, ORALLY DISINTEGRATING ORAL at 06:54

## 2021-10-09 NOTE — ED PROVIDER NOTES
EMERGENCY DEPARTMENT ENCOUNTER    Room Number:  08/08  Date seen:  10/9/2021  Time seen: 06:12 EDT  PCP: Kadi Wilder APRN  Historian: Patient    HPI:  Chief complaint: Food bolus  A complete HPI/ROS/PMH/PSH/SH/FH are unobtainable due to: None  Context:Krupa Mcmanus is a 65 y.o. female, who presents to the ED with c/o eating fish last night at home with her  when she started to choke on a piece a fish.  Pt's spouse did the Heimlich maneuver and pt coughed out the piece of fish.  She reports that afterward she developed nausea and dizziness but reports the dizziness is similar to her dizziness when she has vertigo.  She is also complaining of right shoulder pain that started last night.  She denies any recent falls recent injury to the shoulder.  Patient has been able to drink since last night.  Denies any chest pain or shortness of breath.    Patient was placed in face mask in first look. Patient was wearing facemask when I entered the room and throughout our encounter. I wore full protective equipment throughout this patient encounter including a n95 face mask, goggles, and gloves. Hand hygiene was performed before donning protective equipment and after removal when leaving the room.      MEDICAL RECORD REVIEW      ALLERGIES  Penicillins, Amoxicillin, Benadryl [diphenhydramine], Ceclor [cefaclor], and Zyprexa [olanzapine]    PAST MEDICAL HISTORY  Active Ambulatory Problems     Diagnosis Date Noted   • Affective psychosis, bipolar (HCC) 07/11/2018   • Essential hypertension 07/12/2018   • Mild intermittent asthma without complication 07/12/2018   • Hypokalemia 07/12/2018   • Leukocytosis 07/12/2018   • Elevated liver enzymes 07/12/2018   • Type 2 diabetes mellitus (HCC) 07/12/2018     Resolved Ambulatory Problems     Diagnosis Date Noted   • No Resolved Ambulatory Problems     Past Medical History:   Diagnosis Date   • Alzheimer disease (CMS/HCC)    • Anxiety    • Bipolar 1 disorder (CMS/HCC)    •  Depression    • Diabetes mellitus (CMS/HCC)    • GERD (gastroesophageal reflux disease)    • Rheumatoid arthritis (CMS/HCC)        PAST SURGICAL HISTORY  Past Surgical History:   Procedure Laterality Date   • CHOLECYSTECTOMY     • HYSTERECTOMY         FAMILY HISTORY  No family history on file.    SOCIAL HISTORY  Social History     Socioeconomic History   • Marital status:        REVIEW OF SYSTEMS  Review of Systems    All systems reviewed and negative except for those discussed in HPI.     PHYSICAL EXAM    ED Triage Vitals   Temp Heart Rate Resp BP SpO2   10/09/21 0019 10/09/21 0019 10/09/21 0019 10/09/21 0025 10/09/21 0019   97.8 °F (36.6 °C) 94 18 147/84 97 %      Temp src Heart Rate Source Patient Position BP Location FiO2 (%)   10/09/21 0019 10/09/21 0019 -- -- --   Tympanic Monitor        Physical Exam    I have reviewed the triage vital signs and nursing notes.      GENERAL: not distressed  HENT: nares patent; no difficulty swallowing secretions, oropharynx is clear  EYES: no scleral icterus  NECK: no ROM limitations  CV: regular rhythm, regular rate  RESPIRATORY: normal effort; clear to auscultation bilaterally  ABDOMEN: soft nontender  : deferred  MUSCULOSKELETAL: no deformity  NEURO: alert, moves all extremities, follows commands  SKIN: warm, dry    LAB RESULTS  Recent Results (from the past 24 hour(s))   POC Glucose Once    Collection Time: 10/09/21 12:22 AM    Specimen: Blood   Result Value Ref Range    Glucose 158 (H) 70 - 130 mg/dL   COVID-19, [facility] IN-HOUSE CEPHEID/MERCEDES NP SWAB IN TRANSPORT MEDIA 8-12 HR TAT - Swab, Nasopharynx    Collection Time: 10/09/21  6:56 AM    Specimen: Nasopharynx; Swab   Result Value Ref Range    COVID19 Not Detected Not Detected - Ref. Range         RADIOLOGY RESULTS  XR Shoulder 2+ View Right   Final Result       As described.       This report was finalized on 10/9/2021 6:57 AM by Dr. Marko Hess M.D.          XR Neck Soft Tissue   Final Result   1. No  evidence for active disease in the chest.   2. No radiodense foreign body demonstrated on neck soft tissues. There   is degenerative disc disease within the cervical spine.       This report was finalized on 10/9/2021 1:15 AM by Dr. Nathaniel Flood M.D.          XR Chest 2 View   Final Result   1. No evidence for active disease in the chest.   2. No radiodense foreign body demonstrated on neck soft tissues. There   is degenerative disc disease within the cervical spine.       This report was finalized on 10/9/2021 1:15 AM by Dr. Nathaniel Flood M.D.                PROGRESS, DATA ANALYSIS, CONSULTS AND MEDICAL DECISION MAKING  All labs have been independently reviewed by me.  All radiology studies have been reviewed by me and discussed with radiologist dictating the report. Discussion below represents my analysis of pertinent findings related to patient's condition, differential diagnosis, treatment plan and final disposition.     ED Course as of 10/09/21 1048   Sat Oct 09, 2021   0731 I gave patient water and she was able to drink the water with no complications.  She tolerated it well. [SS]      ED Course User Index  [SS] Karin Abel PA-C       The differential diagnosis include but are not limited to food bolus, shoulder fracture, Monia.       Reviewed pt's history and workup with Dr. Hernandez.  After a bedside evaluation, Dr. Hernandez agrees with the plan of care.    (FOR DISCHARGE)The patient's history, physical exam, and lab findings were discussed with the physician, who also performed a face to face history and physical exam.  I discussed all results and noted any abnormalities with patient.  Discussed absoute need to recheck abnormalities with their family physician.  I answered any of the patient's questions.  Discussed plan for discharge, as there is no emergent indication for admission.  Pt is agreeable and understands need for follow up and repeat testing.  Pt is aware that discharge does not  mean that nothing is wrong but it indicates no emergency is present and they must continue care with their family physician.  Pt is discharged with instructions to follow up with primary care doctor to have their blood pressure rechecked.           Disposition vitals:  /84   Pulse 94   Temp 97.8 °F (36.6 °C) (Tympanic)   Resp 18   LMP 07/11/2018 Comment: postmenopausal  SpO2 97%       DIAGNOSIS  Final diagnoses:   Choking due to food (regurgitated), initial encounter       FOLLOW UP   Kadi Wilder, APRN  [address] [id number] [phone]    Call in 1 day  For close follow-up    [facility]  [address] [facility] [address] [phone]             Karin Abel PA-C  10/09/21 1044

## 2021-10-09 NOTE — ED TRIAGE NOTES
Patient to ed from home with complaints of a food bolus. States she was eating fish from [employer] when she started choking and they had to perform the hemlick. Patient states she still feels like there is food stuck but denies SOA and is able to swallow her saliva.     I wore full protective equipment throughout this patient encounter including a face mask, goggles, and gloves. Hand hygiene was performed before donning protective equipment and after removal when leaving the room.

## 2021-10-09 NOTE — DISCHARGE INSTRUCTIONS
Please call your primary care doctor early next week for close follow-up.  Return to the ER if develop any concerning worsening symptoms.

## 2021-10-09 NOTE — ED PROVIDER NOTES
I have supervised the care provided by the midlevel provider.    We have discussed this patient's history, physical exam, and treatment plan.   I have reviewed the note and have personally examined the patient and agree with the plan of care.  See attached attending note.  My personal findings are below:    Patient choked while eating fish last night.  Her  performed the Heimlich maneuver and she then coughed the piece of fish out.  After that, she had some nausea and dizziness.  Denies chest pain or shortness of breath.  She has been able to drink fluids since then.  Also complains of right shoulder pain but denies recent injury.    On exam: Awake and alert.  Drinking water without difficulty.  Heart is regular rate and rhythm.  Lungs are clear bilaterally.  Respiratory effort is normal.  Abdomen is soft and nontender.  Extremities are nontender with full range of motion.  Follows commands.  Moves all extremities.  Speech is clear and fluent.    Imaging studies are negative acute.  Patient is resting comfortably.  She is able to drink without difficulty.  Patient will be discharged.     Reggie Hernandez MD  10/09/21 3669

## 2021-10-17 ENCOUNTER — APPOINTMENT (OUTPATIENT)
Dept: GENERAL RADIOLOGY | Facility: HOSPITAL | Age: 66
End: 2021-10-17

## 2021-10-17 ENCOUNTER — HOSPITAL ENCOUNTER (EMERGENCY)
Facility: HOSPITAL | Age: 66
Discharge: HOME | End: 2021-10-17
Attending: EMERGENCY MEDICINE | Admitting: EMERGENCY MEDICINE

## 2021-10-17 VITALS
RESPIRATION RATE: 18 BRPM | HEIGHT: 65 IN | TEMPERATURE: 98 F | HEART RATE: 63 BPM | WEIGHT: 235.2 LBS | SYSTOLIC BLOOD PRESSURE: 121 MMHG | DIASTOLIC BLOOD PRESSURE: 67 MMHG | BODY MASS INDEX: 39.18 KG/M2 | OXYGEN SATURATION: 100 %

## 2021-10-17 DIAGNOSIS — R07.89 ATYPICAL CHEST PAIN: ICD-10-CM

## 2021-10-17 DIAGNOSIS — R42 VERTIGO: Primary | ICD-10-CM

## 2021-10-17 LAB
ALBUMIN SERPL-MCNC: 3.8 G/DL (ref 3.5–5.2)
ALBUMIN/GLOB SERPL: 1.5 G/DL
ALP SERPL-CCNC: 71 U/L (ref 39–117)
ALT SERPL W P-5'-P-CCNC: 24 U/L (ref 1–33)
ANION GAP SERPL CALCULATED.3IONS-SCNC: 10.2 MMOL/L (ref 5–15)
AST SERPL-CCNC: 24 U/L (ref 1–32)
BACTERIA UR QL AUTO: ABNORMAL /HPF
BASOPHILS # BLD AUTO: 0.05 10*3/MM3 (ref 0–0.2)
BASOPHILS NFR BLD AUTO: 0.7 % (ref 0–1.5)
BILIRUB SERPL-MCNC: 0.4 MG/DL (ref 0–1.2)
BILIRUB UR QL STRIP: NEGATIVE
BUN SERPL-MCNC: 15 MG/DL (ref 8–23)
BUN/CREAT SERPL: 22.7 (ref 7–25)
CALCIUM SPEC-SCNC: 8.7 MG/DL (ref 8.6–10.5)
CHLORIDE SERPL-SCNC: 104 MMOL/L (ref 98–107)
CLARITY UR: CLEAR
CO2 SERPL-SCNC: 25.8 MMOL/L (ref 22–29)
COLOR UR: YELLOW
CREAT SERPL-MCNC: 0.66 MG/DL (ref 0.57–1)
DEPRECATED RDW RBC AUTO: 48 FL (ref 37–54)
EOSINOPHIL # BLD AUTO: 0.18 10*3/MM3 (ref 0–0.4)
EOSINOPHIL NFR BLD AUTO: 2.6 % (ref 0.3–6.2)
ERYTHROCYTE [DISTWIDTH] IN BLOOD BY AUTOMATED COUNT: 14.8 % (ref 12.3–15.4)
GFR SERPL CREATININE-BSD FRML MDRD: 89 ML/MIN/1.73
GLOBULIN UR ELPH-MCNC: 2.5 GM/DL
GLUCOSE SERPL-MCNC: 97 MG/DL (ref 65–99)
GLUCOSE UR STRIP-MCNC: NEGATIVE MG/DL
HCT VFR BLD AUTO: 34.9 % (ref 34–46.6)
HGB BLD-MCNC: 11 G/DL (ref 12–15.9)
HGB UR QL STRIP.AUTO: NEGATIVE
HOLD SPECIMEN: NORMAL
HOLD SPECIMEN: NORMAL
HYALINE CASTS UR QL AUTO: ABNORMAL /LPF
IMM GRANULOCYTES # BLD AUTO: 0.02 10*3/MM3 (ref 0–0.05)
IMM GRANULOCYTES NFR BLD AUTO: 0.3 % (ref 0–0.5)
KETONES UR QL STRIP: NEGATIVE
LEUKOCYTE ESTERASE UR QL STRIP.AUTO: ABNORMAL
LIPASE SERPL-CCNC: 28 U/L (ref 13–60)
LYMPHOCYTES # BLD AUTO: 1.94 10*3/MM3 (ref 0.7–3.1)
LYMPHOCYTES NFR BLD AUTO: 28 % (ref 19.6–45.3)
MCH RBC QN AUTO: 28 PG (ref 26.6–33)
MCHC RBC AUTO-ENTMCNC: 31.5 G/DL (ref 31.5–35.7)
MCV RBC AUTO: 88.8 FL (ref 79–97)
MONOCYTES # BLD AUTO: 0.62 10*3/MM3 (ref 0.1–0.9)
MONOCYTES NFR BLD AUTO: 9 % (ref 5–12)
NEUTROPHILS NFR BLD AUTO: 4.11 10*3/MM3 (ref 1.7–7)
NEUTROPHILS NFR BLD AUTO: 59.4 % (ref 42.7–76)
NITRITE UR QL STRIP: NEGATIVE
NRBC BLD AUTO-RTO: 0 /100 WBC (ref 0–0.2)
PH UR STRIP.AUTO: 7 [PH] (ref 5–8)
PLATELET # BLD AUTO: 246 10*3/MM3 (ref 140–450)
PMV BLD AUTO: 10.1 FL (ref 6–12)
POTASSIUM SERPL-SCNC: 4.1 MMOL/L (ref 3.5–5.2)
PROT SERPL-MCNC: 6.3 G/DL (ref 6–8.5)
PROT UR QL STRIP: NEGATIVE
QT INTERVAL: 421 MS
RBC # BLD AUTO: 3.93 10*6/MM3 (ref 3.77–5.28)
RBC # UR: ABNORMAL /HPF
REF LAB TEST METHOD: ABNORMAL
SODIUM SERPL-SCNC: 140 MMOL/L (ref 136–145)
SP GR UR STRIP: 1.02 (ref 1–1.03)
SQUAMOUS #/AREA URNS HPF: ABNORMAL /HPF
TROPONIN T SERPL-MCNC: <0.01 NG/ML (ref 0–0.03)
UROBILINOGEN UR QL STRIP: ABNORMAL
WBC # BLD AUTO: 6.92 10*3/MM3 (ref 3.4–10.8)
WBC UR QL AUTO: ABNORMAL /HPF
WHOLE BLOOD HOLD SPECIMEN: NORMAL
WHOLE BLOOD HOLD SPECIMEN: NORMAL

## 2021-10-17 PROCEDURE — 83690 ASSAY OF LIPASE: CPT | Performed by: EMERGENCY MEDICINE

## 2021-10-17 PROCEDURE — 85025 COMPLETE CBC W/AUTO DIFF WBC: CPT | Performed by: EMERGENCY MEDICINE

## 2021-10-17 PROCEDURE — 93010 ELECTROCARDIOGRAM REPORT: CPT | Performed by: INTERNAL MEDICINE

## 2021-10-17 PROCEDURE — 81001 URINALYSIS AUTO W/SCOPE: CPT | Performed by: EMERGENCY MEDICINE

## 2021-10-17 PROCEDURE — 96360 HYDRATION IV INFUSION INIT: CPT

## 2021-10-17 PROCEDURE — 99284 EMERGENCY DEPT VISIT MOD MDM: CPT

## 2021-10-17 PROCEDURE — 71045 X-RAY EXAM CHEST 1 VIEW: CPT

## 2021-10-17 PROCEDURE — 80053 COMPREHEN METABOLIC PANEL: CPT | Performed by: EMERGENCY MEDICINE

## 2021-10-17 PROCEDURE — 84484 ASSAY OF TROPONIN QUANT: CPT | Performed by: EMERGENCY MEDICINE

## 2021-10-17 PROCEDURE — 93005 ELECTROCARDIOGRAM TRACING: CPT | Performed by: EMERGENCY MEDICINE

## 2021-10-17 RX ORDER — PANTOPRAZOLE SODIUM 40 MG/1
40 TABLET, DELAYED RELEASE ORAL DAILY
Qty: 30 TABLET | Refills: 0 | Status: ON HOLD | OUTPATIENT
Start: 2021-10-17 | End: 2023-03-07

## 2021-10-17 RX ORDER — SODIUM CHLORIDE 0.9 % (FLUSH) 0.9 %
10 SYRINGE (ML) INJECTION AS NEEDED
Status: DISCONTINUED | OUTPATIENT
Start: 2021-10-17 | End: 2021-10-17 | Stop reason: HOSPADM

## 2021-10-17 RX ORDER — SUCRALFATE 1 G/1
1 TABLET ORAL 4 TIMES DAILY
Qty: 20 TABLET | Refills: 0 | Status: ON HOLD | OUTPATIENT
Start: 2021-10-17 | End: 2022-08-19

## 2021-10-17 RX ORDER — MECLIZINE HYDROCHLORIDE 25 MG/1
25 TABLET ORAL ONCE AS NEEDED
Status: DISCONTINUED | OUTPATIENT
Start: 2021-10-17 | End: 2021-10-17 | Stop reason: HOSPADM

## 2021-10-17 RX ORDER — MECLIZINE HYDROCHLORIDE 25 MG/1
25 TABLET ORAL 4 TIMES DAILY PRN
Qty: 20 TABLET | Refills: 0 | Status: ON HOLD | OUTPATIENT
Start: 2021-10-17 | End: 2023-03-07

## 2021-10-17 RX ORDER — MECLIZINE HYDROCHLORIDE 25 MG/1
25 TABLET ORAL ONCE
Status: COMPLETED | OUTPATIENT
Start: 2021-10-17 | End: 2021-10-17

## 2021-10-17 RX ADMIN — MECLIZINE HYDROCHLORIDE 25 MG: 25 TABLET ORAL at 08:09

## 2021-10-17 RX ADMIN — SODIUM CHLORIDE 500 ML: 9 INJECTION, SOLUTION INTRAVENOUS at 08:09

## 2021-10-17 NOTE — ED PROVIDER NOTES
EMERGENCY DEPARTMENT ENCOUNTER  Patient was placed in face mask in first look and the following protective measures were taken unless  documented below in the ED course. Patient was wearing facemask when I entered the room and throughout our encounter. I wore full protective equipment throughout this patient encounter including a N95 mask, eye shield, gown and gloves. Hand hygiene was performed before donning protective equipment and after removal when leaving the room.    Room Number:  13/13  Date of encounter:  10/17/2021  PCP: Kadi Wilder APRN    HPI:  Context: Krupa Mcmanus is a 66 y.o. female who presents to the ED c/o chief complaint of vertigo.  Patient reports history of vertigo, has meclizine at home.  Patient complains of vertigo that began after dinner last night.  Patient complains of room spinning sensation, coming and going.  Patient reports that if she stays still for a while it will resolve, reoccurs with position changes or head movements.  Patient denies any double vision, no difficulty swallowing, no difficulty with balance or coordination.  Patient denies any headache, no facial droop, no difficulty with speech, no weakness or numbness.  Patient reports that this vertigo is similar to symptoms that she has had in the past.  Patient is also complaining of chest pain.  Patient reports that she has had intermittent chest pain for last 2 days.  Patient complains of dull burning pain at the bottom of her sternum that occurs after eating, last for short period of time and then resolves, unable to say exactly how long.  Patient does report that the pain is worsened especially after eating dinner and laying down at night.  Patient denies any radiation of the pain to her neck or extremities, denies any acute shortness of breath but does state that occasionally she gets short of breath while walking up the stairs at her house but this is unchanged.  Patient denies any nausea vomiting, no  diaphoresis.  Patient denies any chest pain at present, reports last time she had chest pain was after dinner last night.  Patient denies any abdominal pain, reports normal bowel movements.  Patient denies any cardiac history.    MEDICAL HISTORY REVIEW  Reviewed in [facility]    PAST MEDICAL HISTORY  Active Ambulatory Problems     Diagnosis Date Noted   • Affective psychosis, bipolar (HCC) 07/11/2018   • Essential hypertension 07/12/2018   • Mild intermittent asthma without complication 07/12/2018   • Hypokalemia 07/12/2018   • Leukocytosis 07/12/2018   • Elevated liver enzymes 07/12/2018   • Type 2 diabetes mellitus (HCC) 07/12/2018     Resolved Ambulatory Problems     Diagnosis Date Noted   • No Resolved Ambulatory Problems     Past Medical History:   Diagnosis Date   • Alzheimer disease (HCC)    • Anxiety    • Bipolar 1 disorder (HCC)    • Depression    • Diabetes mellitus (HCC)    • GERD (gastroesophageal reflux disease)    • Rheumatoid arthritis (HCC)        PAST SURGICAL HISTORY  Past Surgical History:   Procedure Laterality Date   • CHOLECYSTECTOMY     • HYSTERECTOMY         FAMILY HISTORY  History reviewed. No pertinent family history.    SOCIAL HISTORY  Social History     Socioeconomic History   • Marital status:    Tobacco Use   • Smoking status: Former Smoker   Substance and Sexual Activity   • Alcohol use: Never       ALLERGIES  Penicillins, Amoxicillin, Benadryl [diphenhydramine], Ceclor [cefaclor], and Zyprexa [olanzapine]    The patient's allergies have been reviewed    REVIEW OF SYSTEMS  All systems reviewed and negative except for those discussed in HPI.     PHYSICAL EXAM  I have reviewed the triage vital signs and nursing notes.  ED Triage Vitals [10/17/21 0709]   Temp Heart Rate Resp BP SpO2   98 °F (36.7 °C) 69 18 -- 98 %      Temp src Heart Rate Source Patient Position BP Location FiO2 (%)   Infrared -- -- -- --       General: No acute distress.  HENT: NCAT, PERRL, Nares patent.  Eyes: no  scleral icterus.  Neck: trachea midline, no ROM limitations.  CV: regular rhythm, regular rate.  Respiratory: normal effort, CTAB.  Abdomen: soft, nondistended, NTTP, no right upper quadrant tenderness, negative Aldana's, no rebound tenderness, no guarding or rigidity.  : deferred.  Musculoskeletal: no deformity.  Neuro: Alert and oriented x3, face symmetric, no facial droop, eyebrows raise equally bilaterally, tongue midline, no dysarthria, no aphasia, extraocular motion intact, no nystagmus, visual acuity grossly normal, cranial nerves II through XII intact, moves all extremities well, 5 out of 5 strength in all extremities, sensation intact light touch all extremities, no ataxia, no tremor.  Skin: warm, dry.    LAB RESULTS  Recent Results (from the past 24 hour(s))   ECG 12 Lead    Collection Time: 10/17/21  7:20 AM   Result Value Ref Range    QT Interval 421 ms   Green Top (Gel)    Collection Time: 10/17/21  7:34 AM   Result Value Ref Range    Extra Tube Hold for add-ons.    Lavender Top    Collection Time: 10/17/21  7:34 AM   Result Value Ref Range    Extra Tube hold for add-on    Gold Top - SST    Collection Time: 10/17/21  7:34 AM   Result Value Ref Range    Extra Tube Hold for add-ons.    Light Blue Top    Collection Time: 10/17/21  7:34 AM   Result Value Ref Range    Extra Tube hold for add-on    Comprehensive Metabolic Panel    Collection Time: 10/17/21  7:34 AM    Specimen: Blood   Result Value Ref Range    Glucose 97 65 - 99 mg/dL    BUN 15 8 - 23 mg/dL    Creatinine 0.66 0.57 - 1.00 mg/dL    Sodium 140 136 - 145 mmol/L    Potassium 4.1 3.5 - 5.2 mmol/L    Chloride 104 98 - 107 mmol/L    CO2 25.8 22.0 - 29.0 mmol/L    Calcium 8.7 8.6 - 10.5 mg/dL    Total Protein 6.3 6.0 - 8.5 g/dL    Albumin 3.80 3.50 - 5.20 g/dL    ALT (SGPT) 24 1 - 33 U/L    AST (SGOT) 24 1 - 32 U/L    Alkaline Phosphatase 71 39 - 117 U/L    Total Bilirubin 0.4 0.0 - 1.2 mg/dL    eGFR Non African Amer 89 >60 mL/min/1.73    Globulin  2.5 gm/dL    A/G Ratio 1.5 g/dL    BUN/Creatinine Ratio 22.7 7.0 - 25.0    Anion Gap 10.2 5.0 - 15.0 mmol/L   Lipase    Collection Time: 10/17/21  7:34 AM    Specimen: Blood   Result Value Ref Range    Lipase 28 13 - 60 U/L   Troponin    Collection Time: 10/17/21  7:34 AM    Specimen: Blood   Result Value Ref Range    Troponin T <0.010 0.000 - 0.030 ng/mL   CBC Auto Differential    Collection Time: 10/17/21  7:34 AM    Specimen: Blood   Result Value Ref Range    WBC 6.92 3.40 - 10.80 10*3/mm3    RBC 3.93 3.77 - 5.28 10*6/mm3    Hemoglobin 11.0 (L) 12.0 - 15.9 g/dL    Hematocrit 34.9 34.0 - 46.6 %    MCV 88.8 79.0 - 97.0 fL    MCH 28.0 26.6 - 33.0 pg    MCHC 31.5 31.5 - 35.7 g/dL    RDW 14.8 12.3 - 15.4 %    RDW-SD 48.0 37.0 - 54.0 fl    MPV 10.1 6.0 - 12.0 fL    Platelets 246 140 - 450 10*3/mm3    Neutrophil % 59.4 42.7 - 76.0 %    Lymphocyte % 28.0 19.6 - 45.3 %    Monocyte % 9.0 5.0 - 12.0 %    Eosinophil % 2.6 0.3 - 6.2 %    Basophil % 0.7 0.0 - 1.5 %    Immature Grans % 0.3 0.0 - 0.5 %    Neutrophils, Absolute 4.11 1.70 - 7.00 10*3/mm3    Lymphocytes, Absolute 1.94 0.70 - 3.10 10*3/mm3    Monocytes, Absolute 0.62 0.10 - 0.90 10*3/mm3    Eosinophils, Absolute 0.18 0.00 - 0.40 10*3/mm3    Basophils, Absolute 0.05 0.00 - 0.20 10*3/mm3    Immature Grans, Absolute 0.02 0.00 - 0.05 10*3/mm3    nRBC 0.0 0.0 - 0.2 /100 WBC   Urinalysis With Microscopic If Indicated (No Culture) - Urine, Clean Catch    Collection Time: 10/17/21  8:08 AM    Specimen: Urine, Clean Catch   Result Value Ref Range    Color, UA Yellow Yellow, Straw    Appearance, UA Clear Clear    pH, UA 7.0 5.0 - 8.0    Specific Gravity, UA 1.023 1.005 - 1.030    Glucose, UA Negative Negative    Ketones, UA Negative Negative    Bilirubin, UA Negative Negative    Blood, UA Negative Negative    Protein, UA Negative Negative    Leuk Esterase, UA Trace (A) Negative    Nitrite, UA Negative Negative    Urobilinogen, UA 1.0 E.U./dL 0.2 - 1.0 E.U./dL   Urinalysis,  Microscopic Only - Urine, Clean Catch    Collection Time: 10/17/21  8:08 AM    Specimen: Urine, Clean Catch   Result Value Ref Range    RBC, UA None Seen None Seen, 0-2 /HPF    WBC, UA 0-2 None Seen, 0-2 /HPF    Bacteria, UA 1+ (A) None Seen /HPF    Squamous Epithelial Cells, UA 0-2 None Seen, 0-2 /HPF    Hyaline Casts, UA 0-2 None Seen /LPF    Methodology Manual Light Microscopy        I ordered the above labs and reviewed the results.    RADIOLOGY  XR Chest 1 View    Result Date: 10/17/2021  ONE VIEW PORTABLE CHEST  HISTORY: Chest pain.  FINDINGS: The lungs are well-expanded and clear. The heart is top normal in size without change from 10/09/2021. There is a moderately large hiatus hernia measuring 10.9 cm that is unchanged.  This report was finalized on 10/17/2021 8:17 AM by Dr. Taj Lambert M.D.        I ordered the above noted radiological studies. I reviewed the images and results. I agree with the radiologist interpretation.    PROCEDURES  Procedures    MEDICATIONS GIVEN IN ER  Medications   sodium chloride 0.9 % flush 10 mL (has no administration in time range)   meclizine (ANTIVERT) tablet 25 mg (has no administration in time range)   meclizine (ANTIVERT) tablet 25 mg (25 mg Oral Given 10/17/21 0809)   sodium chloride 0.9 % bolus 500 mL (500 mL Intravenous New Bag 10/17/21 0809)       PROGRESS, DATA ANALYSIS, CONSULTS, AND MEDICAL DECISION MAKING  A complete history and physical exam have been performed.  All available laboratory and imaging results have been reviewed by myself prior to disposition.    MDM  After the initial H&P, I discussed pertinent information from history and physical exam with patient/family.  Discussed differential diagnosis.  Discussed plan for ED evaluation/work-up/treatment.  All questions answered.  Patient/family is agreeable with plan.  ED Course as of 10/17/21 1031   Sun Oct 17, 2021   0725 EKG independently viewed and contemporaneously interpreted by ED physician. Time:  7:20 AM.  Rate 61.  Interpretation: Normal sinus rhythm, normal axis, normal QRS, no acute ST changes. [JG]   0737 My differential diagnosis for syncope includes but is not limited to:  Vasovagal reflex - situational stimulus, micturition, defecation, cough, sneezing, swallowing, postprandial state, react sinus hypersensitivity  Vascular-prolonged recumbency, sudden postural change, prolonged standing, hypovolemia, vasodilator drugs, autonomic neuropathy, adrenal insufficiency, subclavian steal, pulmonary embolism  Cardiac -arrhythmia, heart block, myocardial infarction, aortic stenosis, cardiac myxoma, cardiac, LV Dysfunction, Aortic Dissection, Pulmonary Hypertension, Pulmonary Stenosis, Pacemaker Failure  CNS-seizure, hypoxia, hypoglycemia, TIA,(basal vertebral), hydrocephalus     [JG]   0737 My differential diagnosis for chest pain includes but is not limited to:  Muscle strain, costochondritis, myositis, pleurisy, rib fracture, intercostal neuritis, herpes zoster, tumor, myocardial infarction, coronary syndrome, unstable angina, angina, aortic dissection, mitral valve prolapse, pericarditis, palpitations, pulmonary embolus, pneumonia, pneumothorax, lung cancer, GERD, esophagitis, esophageal spasm     [JG]   0915 Orthostatics negative. [JG]   0930 HEART SCORE:    History #0  (Highly suspicious 2, Moderately suspicious 1, Slightly or non-suspicious 0)    ECG #0  (Significant ST depression 2,  Nonspecific repol disturbance 1, Normal 0)    Age #2  (> or = 65 2, 46-65 1,  < or = 45 0)    Risk factors #1  (hypercholesterolemia, HTN, DM, smoking, pos fam hx, obesity)  (> or = to 3 RF 2, 1 or 2 1, No risk factors 0)    Troponin #0  (> or = 3x normal limit 2, 1-3x normal limit 1, < or = Normal limit 0)    HEART Score Key:  Scores 0-3: 0.9-1.7% risk of adverse cardiac event. In the HEART Score study, these patients were discharged (0.99% in the retrospective study, 1.7% in the prospective study)  Scores 4-6: 12-16.6%  risk of adverse cardiac event. In the HEART Score study, these patients were admitted to the hospital. (11.6% retrospective, 16.6% prospective)  Scores =7: 50-65% risk of adverse cardiac event. In the HEART Score study, these patients were candidates for early invasive measures. (65.2% retrospective, 50.1% prospective)      This patient's HEART score is 3     [JG]   0943 Patient reassessed.  Patient reports vertigo has resolved.  Patient reports that she is starving and is requesting something to eat.  Will feed patient. [JG]   0945 Patient ambulated without difficulty. [JG]   1027 Patient has tolerated oral intake without difficulty. [JG]   1028 Vertigo has resolved.  Patient has remained without chest pain throughout ED stay.  Chest pain seems more likely to be GI related given pain occurs after eating.  Will prescribe acid medication and give referral to cardiology and GI.  Patient currently asymptomatic, well-appearing, appears appropriate for discharge with outpatient follow-up.  Given extensive discussion return precautions, discharging. [JG]   1028 The patient was reexamined.  They have had symptomatic improvement during their ED stay.  I discussed today's findings with the patient, explaining the pertinent positives and negatives from today's visit, and the plan of care.  Discussed plan for discharge as there is no emergent indication for admission.  Discussed limitation of the ED work-up and that this is to rule out life-threatening emergencies but that they could require further testing as determined by their primary care and or any referred specialist patient is agreeable and understands need for follow-up and repeat exam/testing.  Patient is aware that discharge does not mean there is nothing wrong, indicates no emergency is present, and that they must continue their care with their primary care physician and/or any referred specialist.  They were given appropriate follow-up with their primary care  physician and/or specialist.  I had an extensive discussion on the expected clinical course and return precautions.  Patient understands to return to the emergency department for continuation, worsening, or new symptoms.  I answered any of the patient's questions. Patient was discharged home in a stable condition.     [JG]      ED Course User Index  [JG] Jhon Perdomo MD       AS OF 10:31 EDT VITALS:    BP - 104/60  HR - 60  TEMP - 98 °F (36.7 °C) (Infrared)  O2 SATS - 97%    DIAGNOSIS  Final diagnoses:   Vertigo   Atypical chest pain         DISPOSITION  DISCHARGE    Patient discharged in stable condition.    Reviewed implications of results, diagnosis, meds, responsibility to follow up, warning signs and symptoms of possible worsening, potential complications and reasons to return to ER.    Patient/Family voiced understanding of above instructions.    Discussed plan for discharge, as there is no emergent indication for admission. Patient referred to primary care provider for BP management due to today's BP. Pt/family is agreeable and understands need for follow up and repeat testing.  Pt is aware that discharge does not mean that nothing is wrong but it indicates no emergency is present that requires admission and they must continue care with follow-up as given below or physician of their choice.     FOLLOW-UP  Kadi Wilder, APRN  [address]  [place] [id number]  [phone]    Schedule an appointment as soon as possible for a visit in 2 days  even if well    [facility] CARDIOLOGY  [address]  [facility] [address] [phone]  Schedule an appointment as soon as possible for a visit in 2 days  for further evaluation of your chest pain    [facility] GASTROENTEROLOGY  [address]  [facility] [place] [facility] [address] [phone]  Schedule an appointment as soon as possible for a visit   as needed         Medication List       New Prescriptions    meclizine 25 MG tablet  Commonly known as: ANTIVERT  Take 1 tablet by mouth 4 (Four) Times a Day As Needed for Dizziness.     sucralfate 1 g tablet  Commonly known as: CARAFATE  Take 1 tablet by mouth 4 (Four) Times a Day.           Where to Get Your Medications      These medications were sent to [facility] AT [facility] & ([address] [facility]) - [phone] [facility] [phone] [place] [address]    Phone: [phone]   · meclizine 25 MG tablet  · pantoprazole 40 MG EC tablet  · sucralfate 1 g tablet          Jhon Perdomo MD  10/17/21 4357

## 2021-10-17 NOTE — ED TRIAGE NOTES
Pt to ER from home via PV with c/o dizziness and intermittent chest pain. Pt states vertigo began after eating dinner last night along with n/v. Pt reports dizziness even when standing still. Pt states when chest pain is present it is midsternal, non radiating. Pt denies chest pain currently.       Pt masked in triage, staff in appropriate ppe.

## 2021-10-18 ENCOUNTER — HOSPITAL ENCOUNTER (EMERGENCY)
Facility: HOSPITAL | Age: 66
Discharge: HOME | End: 2021-10-18
Attending: EMERGENCY MEDICINE | Admitting: EMERGENCY MEDICINE

## 2021-10-18 ENCOUNTER — APPOINTMENT (OUTPATIENT)
Dept: GENERAL RADIOLOGY | Facility: HOSPITAL | Age: 66
End: 2021-10-18

## 2021-10-18 VITALS
BODY MASS INDEX: 36.99 KG/M2 | HEIGHT: 65 IN | OXYGEN SATURATION: 98 % | HEART RATE: 59 BPM | RESPIRATION RATE: 18 BRPM | DIASTOLIC BLOOD PRESSURE: 90 MMHG | TEMPERATURE: 98.7 F | WEIGHT: 222 LBS | SYSTOLIC BLOOD PRESSURE: 111 MMHG

## 2021-10-18 DIAGNOSIS — R42 VERTIGO: Primary | ICD-10-CM

## 2021-10-18 LAB
BASOPHILS # BLD AUTO: 0.08 10*3/MM3 (ref 0–0.2)
BASOPHILS NFR BLD AUTO: 1 % (ref 0–1.5)
BILIRUB UR QL STRIP: NEGATIVE
CLARITY UR: CLEAR
COLOR UR: ABNORMAL
DEPRECATED RDW RBC AUTO: 48.7 FL (ref 37–54)
EOSINOPHIL # BLD AUTO: 0.24 10*3/MM3 (ref 0–0.4)
EOSINOPHIL NFR BLD AUTO: 3.1 % (ref 0.3–6.2)
ERYTHROCYTE [DISTWIDTH] IN BLOOD BY AUTOMATED COUNT: 15 % (ref 12.3–15.4)
GLUCOSE UR STRIP-MCNC: NEGATIVE MG/DL
HCT VFR BLD AUTO: 35.9 % (ref 34–46.6)
HGB BLD-MCNC: 11.4 G/DL (ref 12–15.9)
HGB UR QL STRIP.AUTO: NEGATIVE
HOLD SPECIMEN: NORMAL
IMM GRANULOCYTES # BLD AUTO: 0.02 10*3/MM3 (ref 0–0.05)
IMM GRANULOCYTES NFR BLD AUTO: 0.3 % (ref 0–0.5)
KETONES UR QL STRIP: ABNORMAL
LEUKOCYTE ESTERASE UR QL STRIP.AUTO: NEGATIVE
LYMPHOCYTES # BLD AUTO: 1.93 10*3/MM3 (ref 0.7–3.1)
LYMPHOCYTES NFR BLD AUTO: 24.9 % (ref 19.6–45.3)
MAGNESIUM SERPL-MCNC: 2.4 MG/DL (ref 1.6–2.4)
MCH RBC QN AUTO: 28 PG (ref 26.6–33)
MCHC RBC AUTO-ENTMCNC: 31.8 G/DL (ref 31.5–35.7)
MCV RBC AUTO: 88.2 FL (ref 79–97)
MONOCYTES # BLD AUTO: 0.59 10*3/MM3 (ref 0.1–0.9)
MONOCYTES NFR BLD AUTO: 7.6 % (ref 5–12)
NEUTROPHILS NFR BLD AUTO: 4.88 10*3/MM3 (ref 1.7–7)
NEUTROPHILS NFR BLD AUTO: 63.1 % (ref 42.7–76)
NITRITE UR QL STRIP: NEGATIVE
NRBC BLD AUTO-RTO: 0 /100 WBC (ref 0–0.2)
PH UR STRIP.AUTO: 5.5 [PH] (ref 5–8)
PLATELET # BLD AUTO: 264 10*3/MM3 (ref 140–450)
PMV BLD AUTO: 10.4 FL (ref 6–12)
PROT UR QL STRIP: NEGATIVE
QT INTERVAL: 407 MS
RBC # BLD AUTO: 4.07 10*6/MM3 (ref 3.77–5.28)
SP GR UR STRIP: >=1.03 (ref 1–1.03)
TROPONIN T SERPL-MCNC: <0.01 NG/ML (ref 0–0.03)
UROBILINOGEN UR QL STRIP: ABNORMAL
WBC # BLD AUTO: 7.74 10*3/MM3 (ref 3.4–10.8)
WHOLE BLOOD HOLD SPECIMEN: NORMAL

## 2021-10-18 PROCEDURE — 93010 ELECTROCARDIOGRAM REPORT: CPT | Performed by: INTERNAL MEDICINE

## 2021-10-18 PROCEDURE — 81003 URINALYSIS AUTO W/O SCOPE: CPT

## 2021-10-18 PROCEDURE — 93005 ELECTROCARDIOGRAM TRACING: CPT | Performed by: EMERGENCY MEDICINE

## 2021-10-18 PROCEDURE — 84484 ASSAY OF TROPONIN QUANT: CPT

## 2021-10-18 PROCEDURE — 99284 EMERGENCY DEPT VISIT MOD MDM: CPT

## 2021-10-18 PROCEDURE — 83735 ASSAY OF MAGNESIUM: CPT

## 2021-10-18 PROCEDURE — 71045 X-RAY EXAM CHEST 1 VIEW: CPT

## 2021-10-18 PROCEDURE — 85025 COMPLETE CBC W/AUTO DIFF WBC: CPT

## 2021-10-18 PROCEDURE — 93005 ELECTROCARDIOGRAM TRACING: CPT

## 2021-10-18 RX ORDER — SODIUM CHLORIDE 0.9 % (FLUSH) 0.9 %
10 SYRINGE (ML) INJECTION AS NEEDED
Status: DISCONTINUED | OUTPATIENT
Start: 2021-10-18 | End: 2021-10-18 | Stop reason: HOSPADM

## 2021-10-18 NOTE — ED TRIAGE NOTES
"Pt to ER from home via PV with c/o vertigo, n/v and intermittent chest pain. Pt denies chest pain presently, instead stating that it \"feels funny.\"    Pt seen on 10/17 for same.      Pt masked in triage, staff in appropriate ppe.    "

## 2021-10-18 NOTE — DISCHARGE INSTRUCTIONS
Continue current home medications  Increase fluids  Change positions slowly  Keep scheduled appointment with MD today  Return to the ER for worsening or uncontrolled symptoms, worsening headache, chest pain, shortness of breath, persistent vomiting, visual changes, fainting or any other concerning symptoms

## 2021-10-18 NOTE — ED NOTES
"Patient coming out of the room speaking loudly to nurse practitioner, Rena.  Escorted back into room.  \"I called my  to get me because you all aren't going to do anything for me\"  Provider had a shared decision making conversation with patient and she will be discharged.  Await paperwork.  Patient was placed in face mask during first look triage.  Patient was wearing a face mask throughout encounter.  I wore personal protective equipment throughout the encounter.  Hand hygiene was performed before and after patient encounter.        Narda Flores RN  10/18/21 0733    "

## 2021-10-18 NOTE — ED PROVIDER NOTES
EMERGENCY DEPARTMENT ENCOUNTER    Room Number:  02/02  Date of encounter:  10/18/2021  PCP: Kadi Wilder APRN  Historian: Patient      PPE    Patient was placed in face mask in first look. Patient was wearing facemask when I entered the room and throughout our encounter. I wore full protective equipment throughout this patient encounter including a face mask, and gloves. Hand hygiene was performed before donning protective equipment and after removal when leaving the room.        HPI:  Chief Complaint: Vertigo  A complete HPI/ROS/PMH/PSH/SH/FH are unobtainable due to: Nothing    Context: Krupa Mcmanus is a 66 y.o. female who arrives to the ED via private vehicle.  Patient presents with c/o intermittent episodes of vertigo been going on for several weeks.  Patient states that she was seen here yesterday, however she had another episode last night and decided to come in this morning.  She states while she was eating a cheeseburger she started coughing and then everything started spinning and she had mild chest pain that is currently resolved.  Patient denies fever, chills, chest pain, shortness of breath, dizziness, weakness.  Patient states she has an appointment with her PMD today at 1030.  Patient states that nothing makes the symptoms better and thing worsens symptoms.          PAST MEDICAL HISTORY  Active Ambulatory Problems     Diagnosis Date Noted   • Affective psychosis, bipolar (HCC) 07/11/2018   • Essential hypertension 07/12/2018   • Mild intermittent asthma without complication 07/12/2018   • Hypokalemia 07/12/2018   • Leukocytosis 07/12/2018   • Elevated liver enzymes 07/12/2018   • Type 2 diabetes mellitus (HCC) 07/12/2018     Resolved Ambulatory Problems     Diagnosis Date Noted   • No Resolved Ambulatory Problems     Past Medical History:   Diagnosis Date   • Alzheimer disease (HCC)    • Anxiety    • Bipolar 1 disorder (HCC)    • Depression    • Diabetes mellitus (HCC)    • GERD  (gastroesophageal reflux disease)    • Rheumatoid arthritis (HCC)          PAST SURGICAL HISTORY  Past Surgical History:   Procedure Laterality Date   • CHOLECYSTECTOMY     • HYSTERECTOMY           FAMILY HISTORY  History reviewed. No pertinent family history.      SOCIAL HISTORY  Social History     Socioeconomic History   • Marital status:    Tobacco Use   • Smoking status: Former Smoker   Substance and Sexual Activity   • Alcohol use: Never         ALLERGIES  Penicillins, Amoxicillin, Benadryl [diphenhydramine], Ceclor [cefaclor], and Zyprexa [olanzapine]        REVIEW OF SYSTEMS  Review of Systems     All systems reviewed and negative except for those discussed in HPI.        PHYSICAL EXAM    ED Triage Vitals   Temp Heart Rate Resp BP SpO2   10/18/21 0413 10/18/21 0413 10/18/21 0413 10/18/21 0413 10/18/21 0413   98.7 °F (37.1 °C) 68 18 126/73 95 %       Physical Exam  GENERAL: Well appearing, nontoxic appearing, not distressed  HENT: normocephalic, atraumatic  EYES: no scleral icterus, PERRL  CV: regular rhythm, regular rate, no murmur  RESPIRATORY: normal effort, CTAB  ABDOMEN: soft, nontender  MUSCULOSKELETAL: no deformity  NEURO: alert, moves all extremities, follows commands, mental status normal/baseline  Recent and remote memory functions are normal  Patient is attentive with normal concentration  Language is fluent  Speech is clear  Speech is non-dysarthric  Symmetric smile with no facial droop  Eyes close shut strongly bilaterally  Symmetric eyebrow raise bilaterally  EOMI, PERRL  CN II-XII grossly normal otherwise  5/5 strength to bilateral upper and lower extremities  No pronator drift  Intact FNF     SKIN: warm, dry, no rash   Psych: Appropriate mood and affect  Nursing notes and vital signs reviewed      LAB RESULTS  Recent Results (from the past 24 hour(s))   ECG 12 Lead    Collection Time: 10/17/21  7:20 AM   ECG 12 Lead    Collection Time: 10/18/21  5:12 AM   Result Value Ref Range    QT  Interval 407 ms   Troponin    Collection Time: 10/18/21  5:35 AM    Specimen: Blood   Result Value Ref Range    Troponin T <0.010 0.000 - 0.030 ng/mL   Magnesium    Collection Time: 10/18/21  5:35 AM    Specimen: Blood   Result Value Ref Range    Magnesium 2.4 1.6 - 2.4 mg/dL   CBC Auto Differential    Collection Time: 10/18/21  5:35 AM    Specimen: Blood   Result Value Ref Range    WBC 7.74 3.40 - 10.80 10*3/mm3    RBC 4.07 3.77 - 5.28 10*6/mm3    Hemoglobin 11.4 (L) 12.0 - 15.9 g/dL    Hematocrit 35.9 34.0 - 46.6 %    MCV 88.2 79.0 - 97.0 fL    MCH 28.0 26.6 - 33.0 pg    MCHC 31.8 31.5 - 35.7 g/dL    RDW 15.0 12.3 - 15.4 %    RDW-SD 48.7 37.0 - 54.0 fl    MPV 10.4 6.0 - 12.0 fL    Platelets 264 140 - 450 10*3/mm3    Neutrophil % 63.1 42.7 - 76.0 %    Lymphocyte % 24.9 19.6 - 45.3 %    Monocyte % 7.6 5.0 - 12.0 %    Eosinophil % 3.1 0.3 - 6.2 %    Basophil % 1.0 0.0 - 1.5 %    Immature Grans % 0.3 0.0 - 0.5 %    Neutrophils, Absolute 4.88 1.70 - 7.00 10*3/mm3    Lymphocytes, Absolute 1.93 0.70 - 3.10 10*3/mm3    Monocytes, Absolute 0.59 0.10 - 0.90 10*3/mm3    Eosinophils, Absolute 0.24 0.00 - 0.40 10*3/mm3    Basophils, Absolute 0.08 0.00 - 0.20 10*3/mm3    Immature Grans, Absolute 0.02 0.00 - 0.05 10*3/mm3    nRBC 0.0 0.0 - 0.2 /100 WBC   Green Top (Gel)    Collection Time: 10/18/21  5:35 AM   Result Value Ref Range    Extra Tube Hold for add-ons.    Lavender Top    Collection Time: 10/18/21  5:35 AM   Result Value Ref Range    Extra Tube hold for add-on    Urinalysis With Microscopic If Indicated (No Culture) - Urine, Clean Catch    Collection Time: 10/18/21  5:36 AM    Specimen: Urine, Clean Catch   Result Value Ref Range    Color, UA Dark Yellow (A) Yellow, Straw    Appearance, UA Clear Clear    pH, UA 5.5 5.0 - 8.0    Specific Gravity, UA >=1.030 1.005 - 1.030    Glucose, UA Negative Negative    Ketones, UA Trace (A) Negative    Bilirubin, UA Negative Negative    Blood, UA Negative Negative    Protein, UA  Negative Negative    Leuk Esterase, UA Negative Negative    Nitrite, UA Negative Negative    Urobilinogen, UA 1.0 E.U./dL 0.2 - 1.0 E.U./dL       Ordered the above labs and independently reviewed the results.      RADIOLOGY  XR Chest 1 View    Result Date: 10/18/2021  SINGLE VIEW OF THE CHEST HISTORY: weakness and dizziness   COMPARISON: 10/17/2021  FINDINGS: Heart size is within normal limits. No pneumothorax, pleural effusion, or acute infiltrate is seen. Patient does appear to have a hiatal hernia.      No acute findings.  This report was finalized on 10/18/2021 6:01 AM by Dr. Clau Mcmanus M.D.        I ordered the above noted radiological studies and viewed the images on the PACS system.       EKG      Independently viewed by me and interpreted by Dr Marquez         MEDICAL RECORD REVIEW  Medical records reviewed in [facility], patient was seen here yesterday for the same complaints and had an unremarkable work-up done here at that time.  Patient has numerous other ER visits for numerous other complaints.      PROCEDURES    Procedures        DIFFERENTIAL DIAGNOSIS  Differential diagnosis include but are not limited to the following: Vertigo, dizziness, electrolyte abnormality, MI, atypical chest pain, anxiety      PROGRESS, DATA ANALYSIS, CONSULTS, AND MEDICAL DECISION MAKING        ED Course as of 10/18/21 0827   Mon Oct 18, 2021   0708 Discussed pertinent information from history and physical exam with patient.  Discussed differential diagnosis and plan for ED evaluation/work-up and treatment including labs, EKG and chest x-ray.  All questions answered.  Patient is agreeable with this plan.     [MS]   0731 Patient is up out of bed and dressed and has called her  to come and get her.  She states that she is ready to be discharged, she did not sleep a lot last night and she is tired.  We discussed with patient that we were awaiting 1 lab result to come back, she states that she is ready to be discharged  she is going to go to her appointment at 1030 today.  Patient is walking with steady gait. [MS]      ED Course User Index  [MS] Amber Rashid APRN     Discussed plan for discharge, as there is no emergent indication for admission. Pt/family is agreeable and understands need for follow up and repeat testing.  Pt is aware that discharge does not mean that nothing is wrong but it indicates no emergency is present that requires admission and they must continue care with follow-up as given below or physician of their choice.   Patient/Family voiced understanding of above instructions.  Patient discharged in stable condition.    DIAGNOSIS  Final diagnoses:   Vertigo       FOLLOW UP   Kadi Wilder, DERRICK  [address]  [place] [address]  [phone]    Go today  keep scheduled appointment for today at 1030      RX     Medication List      No changes were made to your prescriptions during this visit.         MEDICATIONS GIVEN IN ED    Medications   sodium chloride 0.9 % flush 10 mL (has no administration in time range)           COURSE & MEDICAL DECISION MAKING  Any/All labs and Any/All Imaging studies that were ordered were reviewed and are noted above.  Results were reviewed/discussed with the patient and they were also made aware of online access.    Pt also made aware that some labs, such as cultures, will not be resulted during ER visit and follow up with PMD is necessary.        Amber Rashid APRN  10/18/21 0877

## 2021-10-20 LAB — QT INTERVAL: 418 MS

## 2021-11-25 ENCOUNTER — HOSPITAL ENCOUNTER (EMERGENCY)
Facility: HOSPITAL | Age: 66
Discharge: HOME | End: 2021-11-25
Attending: EMERGENCY MEDICINE | Admitting: EMERGENCY MEDICINE

## 2021-11-25 VITALS
WEIGHT: 239 LBS | RESPIRATION RATE: 16 BRPM | OXYGEN SATURATION: 98 % | BODY MASS INDEX: 39.82 KG/M2 | SYSTOLIC BLOOD PRESSURE: 121 MMHG | TEMPERATURE: 98.8 F | HEART RATE: 59 BPM | DIASTOLIC BLOOD PRESSURE: 61 MMHG | HEIGHT: 65 IN

## 2021-11-25 DIAGNOSIS — R11.0 NAUSEA: ICD-10-CM

## 2021-11-25 DIAGNOSIS — R42 VERTIGO: Primary | ICD-10-CM

## 2021-11-25 LAB
ALBUMIN SERPL-MCNC: 3.6 G/DL (ref 3.5–5.2)
ALBUMIN/GLOB SERPL: 1.5 G/DL
ALP SERPL-CCNC: 64 U/L (ref 39–117)
ALT SERPL W P-5'-P-CCNC: 27 U/L (ref 1–33)
ANION GAP SERPL CALCULATED.3IONS-SCNC: 13.3 MMOL/L (ref 5–15)
AST SERPL-CCNC: 28 U/L (ref 1–32)
BASOPHILS # BLD AUTO: 0.04 10*3/MM3 (ref 0–0.2)
BASOPHILS NFR BLD AUTO: 0.5 % (ref 0–1.5)
BILIRUB SERPL-MCNC: <0.2 MG/DL (ref 0–1.2)
BILIRUB UR QL STRIP: NEGATIVE
BUN SERPL-MCNC: 15 MG/DL (ref 8–23)
BUN/CREAT SERPL: 20.8 (ref 7–25)
CALCIUM SPEC-SCNC: 8.4 MG/DL (ref 8.6–10.5)
CHLORIDE SERPL-SCNC: 103 MMOL/L (ref 98–107)
CLARITY UR: CLEAR
CO2 SERPL-SCNC: 21.7 MMOL/L (ref 22–29)
COLOR UR: YELLOW
CREAT SERPL-MCNC: 0.72 MG/DL (ref 0.57–1)
DEPRECATED RDW RBC AUTO: 46.3 FL (ref 37–54)
EOSINOPHIL # BLD AUTO: 0.24 10*3/MM3 (ref 0–0.4)
EOSINOPHIL NFR BLD AUTO: 3 % (ref 0.3–6.2)
ERYTHROCYTE [DISTWIDTH] IN BLOOD BY AUTOMATED COUNT: 14.4 % (ref 12.3–15.4)
GFR SERPL CREATININE-BSD FRML MDRD: 81 ML/MIN/1.73
GLOBULIN UR ELPH-MCNC: 2.4 GM/DL
GLUCOSE BLDC GLUCOMTR-MCNC: 176 MG/DL (ref 70–130)
GLUCOSE SERPL-MCNC: 182 MG/DL (ref 65–99)
GLUCOSE UR STRIP-MCNC: NEGATIVE MG/DL
HCT VFR BLD AUTO: 33.4 % (ref 34–46.6)
HGB BLD-MCNC: 10.7 G/DL (ref 12–15.9)
HGB UR QL STRIP.AUTO: NEGATIVE
IMM GRANULOCYTES # BLD AUTO: 0.02 10*3/MM3 (ref 0–0.05)
IMM GRANULOCYTES NFR BLD AUTO: 0.3 % (ref 0–0.5)
KETONES UR QL STRIP: NEGATIVE
LEUKOCYTE ESTERASE UR QL STRIP.AUTO: NEGATIVE
LIPASE SERPL-CCNC: 33 U/L (ref 13–60)
LYMPHOCYTES # BLD AUTO: 1.77 10*3/MM3 (ref 0.7–3.1)
LYMPHOCYTES NFR BLD AUTO: 22.3 % (ref 19.6–45.3)
MCH RBC QN AUTO: 28.4 PG (ref 26.6–33)
MCHC RBC AUTO-ENTMCNC: 32 G/DL (ref 31.5–35.7)
MCV RBC AUTO: 88.6 FL (ref 79–97)
MONOCYTES # BLD AUTO: 0.67 10*3/MM3 (ref 0.1–0.9)
MONOCYTES NFR BLD AUTO: 8.4 % (ref 5–12)
NEUTROPHILS NFR BLD AUTO: 5.19 10*3/MM3 (ref 1.7–7)
NEUTROPHILS NFR BLD AUTO: 65.5 % (ref 42.7–76)
NITRITE UR QL STRIP: NEGATIVE
NRBC BLD AUTO-RTO: 0 /100 WBC (ref 0–0.2)
PH UR STRIP.AUTO: <=5 [PH] (ref 5–8)
PLATELET # BLD AUTO: 196 10*3/MM3 (ref 140–450)
PMV BLD AUTO: 10.7 FL (ref 6–12)
POTASSIUM SERPL-SCNC: 4.3 MMOL/L (ref 3.5–5.2)
PROT SERPL-MCNC: 6 G/DL (ref 6–8.5)
PROT UR QL STRIP: NEGATIVE
RBC # BLD AUTO: 3.77 10*6/MM3 (ref 3.77–5.28)
SODIUM SERPL-SCNC: 138 MMOL/L (ref 136–145)
SP GR UR STRIP: 1.01 (ref 1–1.03)
TROPONIN T SERPL-MCNC: <0.01 NG/ML (ref 0–0.03)
UROBILINOGEN UR QL STRIP: NORMAL
WBC NRBC COR # BLD: 7.93 10*3/MM3 (ref 3.4–10.8)

## 2021-11-25 PROCEDURE — 81003 URINALYSIS AUTO W/O SCOPE: CPT | Performed by: EMERGENCY MEDICINE

## 2021-11-25 PROCEDURE — 80053 COMPREHEN METABOLIC PANEL: CPT | Performed by: EMERGENCY MEDICINE

## 2021-11-25 PROCEDURE — 99283 EMERGENCY DEPT VISIT LOW MDM: CPT

## 2021-11-25 PROCEDURE — 93010 ELECTROCARDIOGRAM REPORT: CPT | Performed by: INTERNAL MEDICINE

## 2021-11-25 PROCEDURE — 83690 ASSAY OF LIPASE: CPT | Performed by: EMERGENCY MEDICINE

## 2021-11-25 PROCEDURE — 93005 ELECTROCARDIOGRAM TRACING: CPT | Performed by: EMERGENCY MEDICINE

## 2021-11-25 PROCEDURE — 85025 COMPLETE CBC W/AUTO DIFF WBC: CPT | Performed by: EMERGENCY MEDICINE

## 2021-11-25 PROCEDURE — 84484 ASSAY OF TROPONIN QUANT: CPT | Performed by: EMERGENCY MEDICINE

## 2021-11-25 PROCEDURE — 96374 THER/PROPH/DIAG INJ IV PUSH: CPT

## 2021-11-25 PROCEDURE — 25010000002 PROCHLORPERAZINE 10 MG/2ML SOLUTION: Performed by: EMERGENCY MEDICINE

## 2021-11-25 PROCEDURE — 36415 COLL VENOUS BLD VENIPUNCTURE: CPT

## 2021-11-25 PROCEDURE — 82962 GLUCOSE BLOOD TEST: CPT

## 2021-11-25 RX ORDER — MECLIZINE HYDROCHLORIDE 25 MG/1
25 TABLET ORAL ONCE
Status: COMPLETED | OUTPATIENT
Start: 2021-11-25 | End: 2021-11-25

## 2021-11-25 RX ORDER — SODIUM CHLORIDE 0.9 % (FLUSH) 0.9 %
10 SYRINGE (ML) INJECTION AS NEEDED
Status: DISCONTINUED | OUTPATIENT
Start: 2021-11-25 | End: 2021-11-25 | Stop reason: HOSPADM

## 2021-11-25 RX ORDER — PROCHLORPERAZINE EDISYLATE 5 MG/ML
10 INJECTION INTRAMUSCULAR; INTRAVENOUS EVERY 6 HOURS PRN
Status: DISCONTINUED | OUTPATIENT
Start: 2021-11-25 | End: 2021-11-25 | Stop reason: HOSPADM

## 2021-11-25 RX ADMIN — PROCHLORPERAZINE EDISYLATE 10 MG: 5 INJECTION INTRAMUSCULAR; INTRAVENOUS at 20:12

## 2021-11-25 RX ADMIN — MECLIZINE HYDROCHLORIDE 25 MG: 25 TABLET ORAL at 20:12

## 2021-11-25 RX ADMIN — SODIUM CHLORIDE 500 ML: 9 INJECTION, SOLUTION INTRAVENOUS at 20:10

## 2021-11-26 ENCOUNTER — HOSPITAL ENCOUNTER (EMERGENCY)
Facility: HOSPITAL | Age: 66
Discharge: LEFT BEFORE DISCHARGE | End: 2021-11-26
Attending: EMERGENCY MEDICINE | Admitting: EMERGENCY MEDICINE

## 2021-11-26 ENCOUNTER — HOSPITAL ENCOUNTER (EMERGENCY)
Facility: HOSPITAL | Age: 66
Discharge: HOME | End: 2021-11-27
Attending: EMERGENCY MEDICINE | Admitting: EMERGENCY MEDICINE

## 2021-11-26 ENCOUNTER — HOSPITAL ENCOUNTER (EMERGENCY)
Facility: HOSPITAL | Age: 66
Discharge: HOME | End: 2021-11-26
Attending: EMERGENCY MEDICINE | Admitting: EMERGENCY MEDICINE

## 2021-11-26 ENCOUNTER — NURSE TRIAGE (OUTPATIENT)
Dept: CALL CENTER | Facility: HOSPITAL | Age: 66
End: 2021-11-26

## 2021-11-26 VITALS
SYSTOLIC BLOOD PRESSURE: 105 MMHG | DIASTOLIC BLOOD PRESSURE: 83 MMHG | RESPIRATION RATE: 16 BRPM | TEMPERATURE: 97.1 F | OXYGEN SATURATION: 94 % | HEART RATE: 65 BPM

## 2021-11-26 VITALS — TEMPERATURE: 98.4 F | OXYGEN SATURATION: 87 % | RESPIRATION RATE: 18 BRPM | HEART RATE: 80 BPM

## 2021-11-26 DIAGNOSIS — R10.84 GENERALIZED ABDOMINAL PAIN: Primary | ICD-10-CM

## 2021-11-26 DIAGNOSIS — H81.10 BENIGN PAROXYSMAL POSITIONAL VERTIGO, UNSPECIFIED LATERALITY: Primary | ICD-10-CM

## 2021-11-26 DIAGNOSIS — R42 VERTIGO: ICD-10-CM

## 2021-11-26 DIAGNOSIS — R10.84 GENERALIZED ABDOMINAL PAIN: ICD-10-CM

## 2021-11-26 LAB
ALBUMIN SERPL-MCNC: 3.8 G/DL (ref 3.5–5.2)
ALBUMIN/GLOB SERPL: 1.4 G/DL
ALP SERPL-CCNC: 69 U/L (ref 39–117)
ALT SERPL W P-5'-P-CCNC: 26 U/L (ref 1–33)
ANION GAP SERPL CALCULATED.3IONS-SCNC: 12.9 MMOL/L (ref 5–15)
AST SERPL-CCNC: 26 U/L (ref 1–32)
BASOPHILS # BLD AUTO: 0.03 10*3/MM3 (ref 0–0.2)
BASOPHILS NFR BLD AUTO: 0.4 % (ref 0–1.5)
BILIRUB SERPL-MCNC: 0.2 MG/DL (ref 0–1.2)
BUN SERPL-MCNC: 12 MG/DL (ref 8–23)
BUN/CREAT SERPL: 19.7 (ref 7–25)
CALCIUM SPEC-SCNC: 8.5 MG/DL (ref 8.6–10.5)
CHLORIDE SERPL-SCNC: 103 MMOL/L (ref 98–107)
CO2 SERPL-SCNC: 23.1 MMOL/L (ref 22–29)
CREAT SERPL-MCNC: 0.61 MG/DL (ref 0.57–1)
DEPRECATED RDW RBC AUTO: 47.5 FL (ref 37–54)
EOSINOPHIL # BLD AUTO: 0.22 10*3/MM3 (ref 0–0.4)
EOSINOPHIL NFR BLD AUTO: 3.1 % (ref 0.3–6.2)
ERYTHROCYTE [DISTWIDTH] IN BLOOD BY AUTOMATED COUNT: 14.5 % (ref 12.3–15.4)
GFR SERPL CREATININE-BSD FRML MDRD: 98 ML/MIN/1.73
GLOBULIN UR ELPH-MCNC: 2.7 GM/DL
GLUCOSE SERPL-MCNC: 138 MG/DL (ref 65–99)
HCT VFR BLD AUTO: 35.5 % (ref 34–46.6)
HGB BLD-MCNC: 11.3 G/DL (ref 12–15.9)
IMM GRANULOCYTES # BLD AUTO: 0.02 10*3/MM3 (ref 0–0.05)
IMM GRANULOCYTES NFR BLD AUTO: 0.3 % (ref 0–0.5)
LIPASE SERPL-CCNC: 30 U/L (ref 13–60)
LYMPHOCYTES # BLD AUTO: 1.66 10*3/MM3 (ref 0.7–3.1)
LYMPHOCYTES NFR BLD AUTO: 23.3 % (ref 19.6–45.3)
MCH RBC QN AUTO: 28.7 PG (ref 26.6–33)
MCHC RBC AUTO-ENTMCNC: 31.8 G/DL (ref 31.5–35.7)
MCV RBC AUTO: 90.1 FL (ref 79–97)
MONOCYTES # BLD AUTO: 0.71 10*3/MM3 (ref 0.1–0.9)
MONOCYTES NFR BLD AUTO: 10 % (ref 5–12)
NEUTROPHILS NFR BLD AUTO: 4.48 10*3/MM3 (ref 1.7–7)
NEUTROPHILS NFR BLD AUTO: 62.9 % (ref 42.7–76)
NRBC BLD AUTO-RTO: 0 /100 WBC (ref 0–0.2)
PLATELET # BLD AUTO: 237 10*3/MM3 (ref 140–450)
PMV BLD AUTO: 10.2 FL (ref 6–12)
POTASSIUM SERPL-SCNC: 4.4 MMOL/L (ref 3.5–5.2)
PROT SERPL-MCNC: 6.5 G/DL (ref 6–8.5)
QT INTERVAL: 432 MS
RBC # BLD AUTO: 3.94 10*6/MM3 (ref 3.77–5.28)
SODIUM SERPL-SCNC: 139 MMOL/L (ref 136–145)
TROPONIN T SERPL-MCNC: <0.01 NG/ML (ref 0–0.03)
WBC NRBC COR # BLD: 7.12 10*3/MM3 (ref 3.4–10.8)

## 2021-11-26 PROCEDURE — 36415 COLL VENOUS BLD VENIPUNCTURE: CPT

## 2021-11-26 PROCEDURE — 99282 EMERGENCY DEPT VISIT SF MDM: CPT

## 2021-11-26 PROCEDURE — 96374 THER/PROPH/DIAG INJ IV PUSH: CPT

## 2021-11-26 PROCEDURE — 93005 ELECTROCARDIOGRAM TRACING: CPT | Performed by: EMERGENCY MEDICINE

## 2021-11-26 PROCEDURE — 25010000002 ONDANSETRON PER 1 MG: Performed by: EMERGENCY MEDICINE

## 2021-11-26 PROCEDURE — 99283 EMERGENCY DEPT VISIT LOW MDM: CPT

## 2021-11-26 PROCEDURE — 93010 ELECTROCARDIOGRAM REPORT: CPT | Performed by: INTERNAL MEDICINE

## 2021-11-26 PROCEDURE — 83690 ASSAY OF LIPASE: CPT | Performed by: EMERGENCY MEDICINE

## 2021-11-26 PROCEDURE — 85025 COMPLETE CBC W/AUTO DIFF WBC: CPT | Performed by: EMERGENCY MEDICINE

## 2021-11-26 PROCEDURE — 80053 COMPREHEN METABOLIC PANEL: CPT | Performed by: EMERGENCY MEDICINE

## 2021-11-26 PROCEDURE — 84484 ASSAY OF TROPONIN QUANT: CPT | Performed by: EMERGENCY MEDICINE

## 2021-11-26 RX ORDER — SODIUM CHLORIDE 0.9 % (FLUSH) 0.9 %
10 SYRINGE (ML) INJECTION AS NEEDED
Status: DISCONTINUED | OUTPATIENT
Start: 2021-11-26 | End: 2021-11-26 | Stop reason: HOSPADM

## 2021-11-26 RX ORDER — ALUMINA, MAGNESIA, AND SIMETHICONE 2400; 2400; 240 MG/30ML; MG/30ML; MG/30ML
15 SUSPENSION ORAL ONCE
Status: COMPLETED | OUTPATIENT
Start: 2021-11-26 | End: 2021-11-26

## 2021-11-26 RX ORDER — MECLIZINE HYDROCHLORIDE 25 MG/1
25 TABLET ORAL ONCE
Status: COMPLETED | OUTPATIENT
Start: 2021-11-26 | End: 2021-11-26

## 2021-11-26 RX ORDER — MECLIZINE HYDROCHLORIDE 25 MG/1
25 TABLET ORAL ONCE
Status: COMPLETED | OUTPATIENT
Start: 2021-11-27 | End: 2021-11-27

## 2021-11-26 RX ORDER — ONDANSETRON 2 MG/ML
4 INJECTION INTRAMUSCULAR; INTRAVENOUS ONCE
Status: COMPLETED | OUTPATIENT
Start: 2021-11-26 | End: 2021-11-26

## 2021-11-26 RX ORDER — LORAZEPAM 2 MG/ML
0.5 INJECTION INTRAMUSCULAR ONCE
Status: DISCONTINUED | OUTPATIENT
Start: 2021-11-26 | End: 2021-11-26 | Stop reason: HOSPADM

## 2021-11-26 RX ORDER — LIDOCAINE HYDROCHLORIDE 20 MG/ML
15 SOLUTION OROPHARYNGEAL ONCE
Status: COMPLETED | OUTPATIENT
Start: 2021-11-26 | End: 2021-11-26

## 2021-11-26 RX ADMIN — MAGNESIUM HYDROXIDE,ALUMINUM HYDROXICE,SIMETHICONE 15 ML: 240; 2400; 2400 SUSPENSION ORAL at 05:30

## 2021-11-26 RX ADMIN — MECLIZINE HYDROCHLORIDE 25 MG: 25 TABLET ORAL at 05:30

## 2021-11-26 RX ADMIN — LIDOCAINE HYDROCHLORIDE 15 ML: 20 SOLUTION ORAL; TOPICAL at 05:30

## 2021-11-26 RX ADMIN — ONDANSETRON 4 MG: 2 INJECTION INTRAMUSCULAR; INTRAVENOUS at 05:30

## 2021-11-26 RX ADMIN — SODIUM CHLORIDE 1000 ML: 9 INJECTION, SOLUTION INTRAVENOUS at 05:29

## 2021-11-26 NOTE — TELEPHONE ENCOUNTER
"Caller states not sleeping abdominal pain lower rates ten. Caller states pain will come and go since last night. Caller reporting nausea. Caller advised per guideline.    Reason for Disposition  • [1] SEVERE pain AND [2] age > 60 years    Additional Information  • Negative: Shock suspected (e.g., cold/pale/clammy skin, too weak to stand, low BP, rapid pulse)  • Negative: Difficult to awaken or acting confused (e.g., disoriented, slurred speech)  • Negative: Passed out (i.e., lost consciousness, collapsed and was not responding)  • Negative: Sounds like a life-threatening emergency to the triager  • Negative: Chest pain  • Negative: Pain is mainly in upper abdomen  (if needed ask: \"is it mainly above the belly button?\")  • Negative: Followed an abdomen (stomach) injury  • Negative: [1] Abdominal pain AND [2] pregnant < 20 weeks  • Negative: [1] Abdominal pain AND [2] pregnant 20 or more weeks  • Negative: [1] Abdominal pain AND [2] postpartum (from 0 to 6 weeks after delivery)  • Negative: [1] SEVERE pain (e.g., excruciating) AND [2] present > 1 hour    Answer Assessment - Initial Assessment Questions  1. LOCATION: \"Where does it hurt?\"       Lower abdominal area   2. RADIATION: \"Does the pain shoot anywhere else?\" (e.g., chest, back)      Denies   3. ONSET: \"When did the pain begin?\" (e.g., minutes, hours or days ago)       All night   4. SUDDEN: \"Gradual or sudden onset?\"         5. PATTERN \"Does the pain come and go, or is it constant?\"     - If constant: \"Is it getting better, staying the same, or worsening?\"       (Note: Constant means the pain never goes away completely; most serious pain is constant and it progresses)      - If intermittent: \"How long does it last?\" \"Do you have pain now?\"      (Note: Intermittent means the pain goes away completely between bouts)      Comes and goes   6. SEVERITY: \"How bad is the pain?\"  (e.g., Scale 1-10; mild, moderate, or severe)    - MILD (1-3): doesn't interfere with " "normal activities, abdomen soft and not tender to touch     - MODERATE (4-7): interferes with normal activities or awakens from sleep, tender to touch     - SEVERE (8-10): excruciating pain, doubled over, unable to do any normal activities       10  7. RECURRENT SYMPTOM: \"Have you ever had this type of stomach pain before?\" If Yes, ask: \"When was the last time?\" and \"What happened that time?\"         8. CAUSE: \"What do you think is causing the stomach pain?\"        9. RELIEVING/AGGRAVATING FACTORS: \"What makes it better or worse?\" (e.g., movement, antacids, bowel movement)        10. OTHER SYMPTOMS: \"Has there been any vomiting, diarrhea, constipation, or urine problems?\"        Nausea and depressed   11. PREGNANCY: \"Is there any chance you are pregnant?\" \"When was your last menstrual period?\"    Protocols used: ABDOMINAL PAIN - FEMALE-ADULT-AH      "

## 2021-11-27 ENCOUNTER — NURSE TRIAGE (OUTPATIENT)
Dept: CALL CENTER | Facility: HOSPITAL | Age: 66
End: 2021-11-27

## 2021-11-27 VITALS
HEART RATE: 80 BPM | BODY MASS INDEX: 39.77 KG/M2 | HEIGHT: 65 IN | RESPIRATION RATE: 18 BRPM | OXYGEN SATURATION: 97 % | DIASTOLIC BLOOD PRESSURE: 68 MMHG | TEMPERATURE: 98.4 F | SYSTOLIC BLOOD PRESSURE: 111 MMHG

## 2021-11-27 LAB
ALBUMIN SERPL-MCNC: 3.7 G/DL (ref 3.5–5.2)
ALBUMIN/GLOB SERPL: 1.5 G/DL
ALP SERPL-CCNC: 66 U/L (ref 39–117)
ALT SERPL W P-5'-P-CCNC: 26 U/L (ref 1–33)
ANION GAP SERPL CALCULATED.3IONS-SCNC: 9.6 MMOL/L (ref 5–15)
AST SERPL-CCNC: 20 U/L (ref 1–32)
BASOPHILS # BLD AUTO: 0.03 10*3/MM3 (ref 0–0.2)
BASOPHILS NFR BLD AUTO: 0.4 % (ref 0–1.5)
BILIRUB SERPL-MCNC: <0.2 MG/DL (ref 0–1.2)
BUN SERPL-MCNC: 13 MG/DL (ref 8–23)
BUN/CREAT SERPL: 20 (ref 7–25)
CALCIUM SPEC-SCNC: 8.8 MG/DL (ref 8.6–10.5)
CHLORIDE SERPL-SCNC: 104 MMOL/L (ref 98–107)
CO2 SERPL-SCNC: 28.4 MMOL/L (ref 22–29)
CREAT SERPL-MCNC: 0.65 MG/DL (ref 0.57–1)
DEPRECATED RDW RBC AUTO: 46.6 FL (ref 37–54)
EOSINOPHIL # BLD AUTO: 0.24 10*3/MM3 (ref 0–0.4)
EOSINOPHIL NFR BLD AUTO: 3.3 % (ref 0.3–6.2)
ERYTHROCYTE [DISTWIDTH] IN BLOOD BY AUTOMATED COUNT: 14.5 % (ref 12.3–15.4)
GFR SERPL CREATININE-BSD FRML MDRD: 91 ML/MIN/1.73
GLOBULIN UR ELPH-MCNC: 2.4 GM/DL
GLUCOSE SERPL-MCNC: 152 MG/DL (ref 65–99)
HCT VFR BLD AUTO: 33.3 % (ref 34–46.6)
HGB BLD-MCNC: 10.7 G/DL (ref 12–15.9)
IMM GRANULOCYTES # BLD AUTO: 0.02 10*3/MM3 (ref 0–0.05)
IMM GRANULOCYTES NFR BLD AUTO: 0.3 % (ref 0–0.5)
LIPASE SERPL-CCNC: 29 U/L (ref 13–60)
LYMPHOCYTES # BLD AUTO: 2.16 10*3/MM3 (ref 0.7–3.1)
LYMPHOCYTES NFR BLD AUTO: 29.4 % (ref 19.6–45.3)
MCH RBC QN AUTO: 28.4 PG (ref 26.6–33)
MCHC RBC AUTO-ENTMCNC: 32.1 G/DL (ref 31.5–35.7)
MCV RBC AUTO: 88.3 FL (ref 79–97)
MONOCYTES # BLD AUTO: 0.75 10*3/MM3 (ref 0.1–0.9)
MONOCYTES NFR BLD AUTO: 10.2 % (ref 5–12)
NEUTROPHILS NFR BLD AUTO: 4.14 10*3/MM3 (ref 1.7–7)
NEUTROPHILS NFR BLD AUTO: 56.4 % (ref 42.7–76)
NRBC BLD AUTO-RTO: 0 /100 WBC (ref 0–0.2)
PLATELET # BLD AUTO: 224 10*3/MM3 (ref 140–450)
PMV BLD AUTO: 10.1 FL (ref 6–12)
POTASSIUM SERPL-SCNC: 3.8 MMOL/L (ref 3.5–5.2)
PROT SERPL-MCNC: 6.1 G/DL (ref 6–8.5)
QT INTERVAL: 425 MS
RBC # BLD AUTO: 3.77 10*6/MM3 (ref 3.77–5.28)
SODIUM SERPL-SCNC: 142 MMOL/L (ref 136–145)
TROPONIN T SERPL-MCNC: <0.01 NG/ML (ref 0–0.03)
WBC NRBC COR # BLD: 7.34 10*3/MM3 (ref 3.4–10.8)

## 2021-11-27 PROCEDURE — 85025 COMPLETE CBC W/AUTO DIFF WBC: CPT | Performed by: EMERGENCY MEDICINE

## 2021-11-27 PROCEDURE — 36415 COLL VENOUS BLD VENIPUNCTURE: CPT

## 2021-11-27 PROCEDURE — 80053 COMPREHEN METABOLIC PANEL: CPT | Performed by: EMERGENCY MEDICINE

## 2021-11-27 PROCEDURE — 83690 ASSAY OF LIPASE: CPT | Performed by: EMERGENCY MEDICINE

## 2021-11-27 PROCEDURE — 93005 ELECTROCARDIOGRAM TRACING: CPT | Performed by: EMERGENCY MEDICINE

## 2021-11-27 PROCEDURE — 84484 ASSAY OF TROPONIN QUANT: CPT | Performed by: EMERGENCY MEDICINE

## 2021-11-27 PROCEDURE — 93010 ELECTROCARDIOGRAM REPORT: CPT | Performed by: INTERNAL MEDICINE

## 2021-11-27 RX ORDER — LIDOCAINE HYDROCHLORIDE 20 MG/ML
15 SOLUTION OROPHARYNGEAL ONCE
Status: COMPLETED | OUTPATIENT
Start: 2021-11-27 | End: 2021-11-27

## 2021-11-27 RX ORDER — ALUMINA, MAGNESIA, AND SIMETHICONE 2400; 2400; 240 MG/30ML; MG/30ML; MG/30ML
15 SUSPENSION ORAL ONCE
Status: COMPLETED | OUTPATIENT
Start: 2021-11-27 | End: 2021-11-27

## 2021-11-27 RX ADMIN — MAGNESIUM HYDROXIDE,ALUMINUM HYDROXICE,SIMETHICONE 15 ML: 240; 2400; 2400 SUSPENSION ORAL at 00:53

## 2021-11-27 RX ADMIN — LIDOCAINE HYDROCHLORIDE 15 ML: 20 SOLUTION ORAL; TOPICAL at 00:53

## 2021-11-27 RX ADMIN — MECLIZINE HYDROCHLORIDE 25 MG: 25 TABLET ORAL at 00:52

## 2021-11-28 ENCOUNTER — NURSE TRIAGE (OUTPATIENT)
Dept: CALL CENTER | Facility: HOSPITAL | Age: 66
End: 2021-11-28

## 2021-11-28 LAB — QT INTERVAL: 409 MS

## 2021-11-28 NOTE — TELEPHONE ENCOUNTER
Reason for Disposition  • [1] MILD swelling of both ankles (i.e., pedal edema) AND [2] is a chronic symptom (recurrent or ongoing AND present > 4 weeks)    Additional Information  • Negative: SEVERE difficulty breathing (e.g., struggling for each breath, speaks in single words)  • Negative: Looks like a broken bone or dislocated joint (e.g., crooked or deformed)  • Negative: Sounds like a life-threatening emergency to the triager  • Negative: Chest pain  • Negative: Followed a leg injury  • Negative: [1] Small area of swelling AND [2] followed an insect bite to the area  • Negative: Swelling of one ankle joint  • Negative: Swelling of knee is main symptom  • Negative: Pregnant  • Negative: Postpartum (from 0 to 6 weeks after delivery)  • Negative: Difficulty breathing at rest  • Negative: Entire foot is cool or blue in comparison to other side  • Negative: [1] Can't walk or can barely walk AND [2] new-onset  • Negative: [1] Difficulty breathing with exertion (e.g., walking) AND [2] new-onset or worsening  • Negative: [1] Red area or streak AND [2] fever  • Negative: [1] Swelling is painful to touch AND [2] fever  • Negative: [1] Cast on leg or ankle AND [2] now increased pain  • Negative: Patient sounds very sick or weak to the triager  • Negative: SEVERE leg swelling (e.g., swelling extends above knee, entire leg is swollen, weeping fluid)  • Negative: [1] Red area or streak [2] large (> 2 in. or 5 cm)  • Negative: [1] Thigh or calf pain AND [2] only 1 side AND [3] present > 1 hour  • Negative: [1] Thigh, calf, or ankle swelling AND [2] only 1 side  • Negative: [1] Thigh, calf, or ankle swelling AND [2] bilateral AND [3] 1 side is more swollen  • Negative: [1] MODERATE leg swelling (e.g., swelling extends up to knees) AND [2] new-onset or worsening  • Negative: Swelling of face, arm or hands  (Exception: slight puffiness of fingers occurring during hot weather)  • Negative: Looks like a boil, infected sore,  "deep ulcer or other infected rash (spreading redness, pus)  • Negative: [1] MILD swelling of both ankles (i.e., pedal edema) AND [2] new-onset or worsening  • Negative: [1] MILD swelling of both ankles (i.e., pedal edema) AND [2] varicose veins  • Negative: [1] MILD swelling of both ankles (i.e., pedal edema) AND [2] caused by hot weather    Answer Assessment - Initial Assessment Questions  1. ONSET: \"When did the swelling start?\" (e.g., minutes, hours, days)      Ongoing problem.    2. LOCATION: \"What part of the leg is swollen?\"  \"Are both legs swollen or just one leg?\"      Swelling starts in feet and goes up to knees.    3. SEVERITY: \"How bad is the swelling?\" (e.g., localized; mild, moderate, severe)   - Localized - small area of swelling localized to one leg   - MILD pedal edema - swelling limited to foot and ankle, pitting edema < 1/4 inch (6 mm) deep, rest and elevation eliminate most or all swelling   - MODERATE edema - swelling of lower leg to knee, pitting edema > 1/4 inch (6 mm) deep, rest and elevation only partially reduce swelling   - SEVERE edema - swelling extends above knee, facial or hand swelling present       Moderate.    4. REDNESS: \"Does the swelling look red or infected?\"      Left foot and leg was very red last night but okay today.    5. PAIN: \"Is the swelling painful to touch?\" If Yes, ask: \"How painful is it?\"   (Scale 1-10; mild, moderate or severe)      Legs are not painful to touch but has a tingling sensation.    6. FEVER: \"Do you have a fever?\" If Yes, ask: \"What is it, how was it measured, and when did it start?\"       No fever.    7. CAUSE: \"What do you think is causing the leg swelling?\"      Unknown    8. MEDICAL HISTORY: \"Do you have a history of heart failure, kidney disease, liver failure, or cancer?\"      Has leg edema.    9. RECURRENT SYMPTOM: \"Have you had leg swelling before?\" If Yes, ask: \"When was the last time?\" \"What happened that time?\"      Yes advised to wear " "compression socks and elevate feet.    10. OTHER SYMPTOMS: \"Do you have any other symptoms?\" (e.g., chest pain, difficulty breathing)        No.    11. PREGNANCY: \"Is there any chance you are pregnant?\" \"When was your last menstrual period?\"        No.    Protocols used: LEG SWELLING AND EDEMA-ADULT-AH      "

## 2021-11-28 NOTE — TELEPHONE ENCOUNTER
"Caller states having severe leg swelling and even having abdominal area tightness and swelling. Caller reporting some mild dyspnea. Caller denies fever or Chest Pain at this time. Caller advised per guideline.         Reason for Disposition  • [1] Difficulty breathing with exertion (e.g., walking) AND [2] new-onset or worsening    Additional Information  • Negative: SEVERE difficulty breathing (e.g., struggling for each breath, speaks in single words)  • Negative: Looks like a broken bone or dislocated joint (e.g., crooked or deformed)  • Negative: Sounds like a life-threatening emergency to the triager  • Negative: Chest pain  • Negative: Followed a leg injury  • Negative: [1] Small area of swelling AND [2] followed an insect bite to the area  • Negative: Swelling of one ankle joint  • Negative: Swelling of knee is main symptom  • Negative: Pregnant  • Negative: Postpartum (from 0 to 6 weeks after delivery)  • Negative: Difficulty breathing at rest  • Negative: Entire foot is cool or blue in comparison to other side  • Negative: [1] Can't walk or can barely walk AND [2] new-onset    Answer Assessment - Initial Assessment Questions  1. ONSET: \"When did the swelling start?\" (e.g., minutes, hours, days)      Last night   2. LOCATION: \"What part of the leg is swollen?\"  \"Are both legs swollen or just one leg?\"      Legs and even in abdominal area   3. SEVERITY: \"How bad is the swelling?\" (e.g., localized; mild, moderate, severe)   - Localized - small area of swelling localized to one leg   - MILD pedal edema - swelling limited to foot and ankle, pitting edema < 1/4 inch (6 mm) deep, rest and elevation eliminate most or all swelling   - MODERATE edema - swelling of lower leg to knee, pitting edema > 1/4 inch (6 mm) deep, rest and elevation only partially reduce swelling   - SEVERE edema - swelling extends above knee, facial or hand swelling present       Severe   4. REDNESS: \"Does the swelling look red or infected?\"     " "   5. PAIN: \"Is the swelling painful to touch?\" If Yes, ask: \"How painful is it?\"   (Scale 1-10; mild, moderate or severe)      Not painful but tingling   6. FEVER: \"Do you have a fever?\" If Yes, ask: \"What is it, how was it measured, and when did it start?\"       Denies   7. CAUSE: \"What do you think is causing the leg swelling?\"      Not sure   8. MEDICAL HISTORY: \"Do you have a history of heart failure, kidney disease, liver failure, or cancer?\"       Denies all   9. RECURRENT SYMPTOM: \"Have you had leg swelling before?\" If Yes, ask: \"When was the last time?\" \"What happened that time?\"      Yes   10. OTHER SYMPTOMS: \"Do you have any other symptoms?\" (e.g., chest pain, difficulty breathing)        Some dyspnea   11. PREGNANCY: \"Is there any chance you are pregnant?\" \"When was your last menstrual period?\"    Protocols used: LEG SWELLING AND EDEMA-ADULT-      "

## 2021-12-07 ENCOUNTER — HOSPITAL ENCOUNTER (EMERGENCY)
Facility: HOSPITAL | Age: 66
Discharge: HOME | End: 2021-12-07
Attending: EMERGENCY MEDICINE | Admitting: EMERGENCY MEDICINE

## 2021-12-07 ENCOUNTER — APPOINTMENT (OUTPATIENT)
Dept: GENERAL RADIOLOGY | Facility: HOSPITAL | Age: 66
End: 2021-12-07

## 2021-12-07 VITALS
HEART RATE: 60 BPM | DIASTOLIC BLOOD PRESSURE: 76 MMHG | RESPIRATION RATE: 18 BRPM | OXYGEN SATURATION: 94 % | SYSTOLIC BLOOD PRESSURE: 131 MMHG | TEMPERATURE: 97.6 F

## 2021-12-07 DIAGNOSIS — Z86.59 HISTORY OF DEMENTIA: ICD-10-CM

## 2021-12-07 DIAGNOSIS — F31.9 BIPOLAR AFFECTIVE DISORDER, REMISSION STATUS UNSPECIFIED (HCC): ICD-10-CM

## 2021-12-07 DIAGNOSIS — K59.00 CONSTIPATION, UNSPECIFIED CONSTIPATION TYPE: ICD-10-CM

## 2021-12-07 DIAGNOSIS — I10 PRIMARY HYPERTENSION: ICD-10-CM

## 2021-12-07 DIAGNOSIS — G89.29 CHRONIC ABDOMINAL PAIN: Primary | ICD-10-CM

## 2021-12-07 DIAGNOSIS — R07.89 ATYPICAL CHEST PAIN: ICD-10-CM

## 2021-12-07 DIAGNOSIS — R10.9 CHRONIC ABDOMINAL PAIN: Primary | ICD-10-CM

## 2021-12-07 DIAGNOSIS — E11.9 TYPE 2 DIABETES MELLITUS WITHOUT COMPLICATION, WITHOUT LONG-TERM CURRENT USE OF INSULIN (HCC): ICD-10-CM

## 2021-12-07 LAB
ALBUMIN SERPL-MCNC: 3.4 G/DL (ref 3.5–5.2)
ALBUMIN/GLOB SERPL: 1.1 G/DL
ALP SERPL-CCNC: 83 U/L (ref 39–117)
ALT SERPL W P-5'-P-CCNC: 39 U/L (ref 1–33)
ANION GAP SERPL CALCULATED.3IONS-SCNC: 10 MMOL/L (ref 5–15)
AST SERPL-CCNC: 35 U/L (ref 1–32)
BASOPHILS # BLD AUTO: 0.05 10*3/MM3 (ref 0–0.2)
BASOPHILS NFR BLD AUTO: 0.7 % (ref 0–1.5)
BILIRUB SERPL-MCNC: <0.2 MG/DL (ref 0–1.2)
BILIRUB UR QL STRIP: NEGATIVE
BUN SERPL-MCNC: 15 MG/DL (ref 8–23)
BUN/CREAT SERPL: 23.8 (ref 7–25)
CALCIUM SPEC-SCNC: 8.7 MG/DL (ref 8.6–10.5)
CHLORIDE SERPL-SCNC: 99 MMOL/L (ref 98–107)
CLARITY UR: CLEAR
CO2 SERPL-SCNC: 29 MMOL/L (ref 22–29)
COLOR UR: YELLOW
CREAT SERPL-MCNC: 0.63 MG/DL (ref 0.57–1)
DEPRECATED RDW RBC AUTO: 45.4 FL (ref 37–54)
EOSINOPHIL # BLD AUTO: 0.25 10*3/MM3 (ref 0–0.4)
EOSINOPHIL NFR BLD AUTO: 3.4 % (ref 0.3–6.2)
ERYTHROCYTE [DISTWIDTH] IN BLOOD BY AUTOMATED COUNT: 14.4 % (ref 12.3–15.4)
GFR SERPL CREATININE-BSD FRML MDRD: 94 ML/MIN/1.73
GLOBULIN UR ELPH-MCNC: 3 GM/DL
GLUCOSE SERPL-MCNC: 202 MG/DL (ref 65–99)
GLUCOSE UR STRIP-MCNC: NEGATIVE MG/DL
HCT VFR BLD AUTO: 34.8 % (ref 34–46.6)
HGB BLD-MCNC: 11.1 G/DL (ref 12–15.9)
HGB UR QL STRIP.AUTO: NEGATIVE
IMM GRANULOCYTES # BLD AUTO: 0.03 10*3/MM3 (ref 0–0.05)
IMM GRANULOCYTES NFR BLD AUTO: 0.4 % (ref 0–0.5)
KETONES UR QL STRIP: ABNORMAL
LEUKOCYTE ESTERASE UR QL STRIP.AUTO: NEGATIVE
LIPASE SERPL-CCNC: 32 U/L (ref 13–60)
LYMPHOCYTES # BLD AUTO: 2.08 10*3/MM3 (ref 0.7–3.1)
LYMPHOCYTES NFR BLD AUTO: 28 % (ref 19.6–45.3)
MCH RBC QN AUTO: 28.1 PG (ref 26.6–33)
MCHC RBC AUTO-ENTMCNC: 31.9 G/DL (ref 31.5–35.7)
MCV RBC AUTO: 88.1 FL (ref 79–97)
MONOCYTES # BLD AUTO: 0.74 10*3/MM3 (ref 0.1–0.9)
MONOCYTES NFR BLD AUTO: 10 % (ref 5–12)
NEUTROPHILS NFR BLD AUTO: 4.28 10*3/MM3 (ref 1.7–7)
NEUTROPHILS NFR BLD AUTO: 57.5 % (ref 42.7–76)
NITRITE UR QL STRIP: NEGATIVE
NRBC BLD AUTO-RTO: 0 /100 WBC (ref 0–0.2)
PH UR STRIP.AUTO: 5.5 [PH] (ref 5–8)
PLATELET # BLD AUTO: 244 10*3/MM3 (ref 140–450)
PMV BLD AUTO: 10.2 FL (ref 6–12)
POTASSIUM SERPL-SCNC: 3.6 MMOL/L (ref 3.5–5.2)
PROT SERPL-MCNC: 6.4 G/DL (ref 6–8.5)
PROT UR QL STRIP: NEGATIVE
RBC # BLD AUTO: 3.95 10*6/MM3 (ref 3.77–5.28)
SODIUM SERPL-SCNC: 138 MMOL/L (ref 136–145)
SP GR UR STRIP: 1.02 (ref 1–1.03)
TROPONIN T SERPL-MCNC: <0.01 NG/ML (ref 0–0.03)
UROBILINOGEN UR QL STRIP: ABNORMAL
WBC NRBC COR # BLD: 7.43 10*3/MM3 (ref 3.4–10.8)

## 2021-12-07 PROCEDURE — 80053 COMPREHEN METABOLIC PANEL: CPT | Performed by: PHYSICIAN ASSISTANT

## 2021-12-07 PROCEDURE — 74022 RADEX COMPL AQT ABD SERIES: CPT

## 2021-12-07 PROCEDURE — 93005 ELECTROCARDIOGRAM TRACING: CPT | Performed by: PHYSICIAN ASSISTANT

## 2021-12-07 PROCEDURE — 99284 EMERGENCY DEPT VISIT MOD MDM: CPT

## 2021-12-07 PROCEDURE — 93010 ELECTROCARDIOGRAM REPORT: CPT | Performed by: INTERNAL MEDICINE

## 2021-12-07 PROCEDURE — 81003 URINALYSIS AUTO W/O SCOPE: CPT | Performed by: EMERGENCY MEDICINE

## 2021-12-07 PROCEDURE — 83690 ASSAY OF LIPASE: CPT | Performed by: PHYSICIAN ASSISTANT

## 2021-12-07 PROCEDURE — 85025 COMPLETE CBC W/AUTO DIFF WBC: CPT | Performed by: PHYSICIAN ASSISTANT

## 2021-12-07 PROCEDURE — 84484 ASSAY OF TROPONIN QUANT: CPT | Performed by: PHYSICIAN ASSISTANT

## 2021-12-07 RX ORDER — LIDOCAINE HYDROCHLORIDE 20 MG/ML
15 SOLUTION OROPHARYNGEAL ONCE
Status: COMPLETED | OUTPATIENT
Start: 2021-12-07 | End: 2021-12-07

## 2021-12-07 RX ORDER — ALUMINA, MAGNESIA, AND SIMETHICONE 2400; 2400; 240 MG/30ML; MG/30ML; MG/30ML
15 SUSPENSION ORAL ONCE
Status: COMPLETED | OUTPATIENT
Start: 2021-12-07 | End: 2021-12-07

## 2021-12-07 RX ORDER — POLYETHYLENE GLYCOL 3350 17 G/17G
17 POWDER, FOR SOLUTION ORAL DAILY
Qty: 10 EACH | Refills: 0 | Status: ON HOLD | OUTPATIENT
Start: 2021-12-07 | End: 2023-03-07

## 2021-12-07 RX ADMIN — LIDOCAINE HYDROCHLORIDE 15 ML: 20 SOLUTION ORAL; TOPICAL at 22:32

## 2021-12-07 RX ADMIN — MAGNESIUM HYDROXIDE,ALUMINUM HYDROXICE,SIMETHICONE 15 ML: 240; 2400; 2400 SUSPENSION ORAL at 22:31

## 2021-12-08 LAB — QT INTERVAL: 434 MS

## 2021-12-08 NOTE — ED NOTES
Pt to er from home via ems c/o abdominal pain for past month. Pt has been seen at several hospitals in the past few days. Pt has hiatal hernia and has appt with GI in Feb, reports that pain was too much today. Reports constipation for 2 weeks but has not been taking prescribed miralax. This rn wearing mask and goggles. Pt wearing mask during encounter.        Kailey Wall, SHAHRIAR  12/07/21 1947

## 2021-12-08 NOTE — DISCHARGE INSTRUCTIONS
Home, rest, medicine as directed, keep well-hydrated.  Home medicine as prescribed, follow up with PCP for recheck. Return to care with further concerns.

## 2021-12-08 NOTE — ED PROVIDER NOTES
" EMERGENCY DEPARTMENT ENCOUNTER    Room Number:  04/04  Date of encounter:  12/7/2021  PCP: System, Provider Not In  Historian: Patient      HPI:  Chief Complaint: Abdominal pain       Context: Krupa Mcmanus is a 66 y.o. female with past medical history of early onset Alzheimer's dementia with behavioral disturbance, bipolar disorder, RA, HTN, T2DM, and GERD who presents to the ED c/o abdominal pain.  Patient states \"abdominal pains and chest pains\".  Patient has been having chronic abdominal pain with multiple ER visits for the same complaint.  Patient states that the pain is in the middle of her belly and radiates to her sides, states the pain is \"dull and sharp\".  It is associated with nausea.  Denies any vomiting, diarrhea, fever, dysuria, hematuria, frequency or urine Orestes urgency.  Patient states that her symptoms are worse with eating and nothing makes it better.  Patient also complains of central chest pain that she describes as dull and sharp.  The pain does not radiate, not exertional, not pleuritic is associated with some shortness of breath.      MEDICAL RECORD REVIEW    Patient was seen in the ER earlier today and yesterday at [facility], and admitted on 11/30/2021 from [facility] to [facility] for a geriatric psychiatric bed suicidal ideation, Alzheimer's dementia with behavioral disturbance, bipolar disorder.      PAST MEDICAL HISTORY  Active Ambulatory Problems     Diagnosis Date Noted   • Affective psychosis, bipolar (HCC) 07/11/2018   • Essential hypertension 07/12/2018   • Mild intermittent asthma without complication 07/12/2018   • Hypokalemia 07/12/2018   • Leukocytosis 07/12/2018   • Elevated liver enzymes 07/12/2018   • Type 2 diabetes mellitus (HCC) 07/12/2018     Resolved Ambulatory Problems     Diagnosis Date Noted   • No Resolved Ambulatory Problems     Past Medical History:   Diagnosis Date   • Alzheimer disease (HCC)    • Anxiety    • Bipolar 1 " disorder (HCC)    • Depression    • Diabetes mellitus (HCC)    • GERD (gastroesophageal reflux disease)    • Rheumatoid arthritis (HCC)          PAST SURGICAL HISTORY  Past Surgical History:   Procedure Laterality Date   • CHOLECYSTECTOMY     • HYSTERECTOMY           FAMILY HISTORY  No family history on file.      SOCIAL HISTORY  Social History     Socioeconomic History   • Marital status:    Tobacco Use   • Smoking status: Former Smoker   Substance and Sexual Activity   • Alcohol use: Never         ALLERGIES  Amoxicillin, Benadryl [diphenhydramine], Ceclor [cefaclor], Penicillins, and Zyprexa [olanzapine]        REVIEW OF SYSTEMS  Review of Systems     All systems reviewed and negative except for those discussed in HPI.       PHYSICAL EXAM    I have reviewed the triage vital signs and nursing notes.    ED Triage Vitals [12/07/21 1944]   Temp Heart Rate Resp BP SpO2   97.6 °F (36.4 °C) 76 18 135/72 97 %      Temp src Heart Rate Source Patient Position BP Location FiO2 (%)   -- -- -- -- --       Physical Exam  GENERAL: Alert and oriented x3, flat affect, not distressed  HENT: nares patent  EYES: no scleral icterus  CV: regular rhythm, regular rate  RESPIRATORY: normal effort  ABDOMEN: soft/nontender, no rebound or guarding, normal bowel sounds  MUSCULOSKELETAL: no deformity  NEURO: alert, moves all extremities, follows commands  SKIN: warm, dry        LAB RESULTS  Recent Results (from the past 24 hour(s))   ECG 12 Lead    Collection Time: 12/07/21  8:57 PM   Result Value Ref Range    QT Interval 434 ms   Comprehensive Metabolic Panel    Collection Time: 12/07/21  9:12 PM    Specimen: Blood   Result Value Ref Range    Glucose 202 (H) 65 - 99 mg/dL    BUN 15 8 - 23 mg/dL    Creatinine 0.63 0.57 - 1.00 mg/dL    Sodium 138 136 - 145 mmol/L    Potassium 3.6 3.5 - 5.2 mmol/L    Chloride 99 98 - 107 mmol/L    CO2 29.0 22.0 - 29.0 mmol/L    Calcium 8.7 8.6 - 10.5 mg/dL    Total Protein 6.4 6.0 - 8.5 g/dL    Albumin  3.40 (L) 3.50 - 5.20 g/dL    ALT (SGPT) 39 (H) 1 - 33 U/L    AST (SGOT) 35 (H) 1 - 32 U/L    Alkaline Phosphatase 83 39 - 117 U/L    Total Bilirubin <0.2 0.0 - 1.2 mg/dL    eGFR Non African Amer 94 >60 mL/min/1.73    Globulin 3.0 gm/dL    A/G Ratio 1.1 g/dL    BUN/Creatinine Ratio 23.8 7.0 - 25.0    Anion Gap 10.0 5.0 - 15.0 mmol/L   Lipase    Collection Time: 12/07/21  9:12 PM    Specimen: Blood   Result Value Ref Range    Lipase 32 13 - 60 U/L   Troponin    Collection Time: 12/07/21  9:12 PM    Specimen: Blood   Result Value Ref Range    Troponin T <0.010 0.000 - 0.030 ng/mL   CBC Auto Differential    Collection Time: 12/07/21  9:12 PM    Specimen: Blood   Result Value Ref Range    WBC 7.43 3.40 - 10.80 10*3/mm3    RBC 3.95 3.77 - 5.28 10*6/mm3    Hemoglobin 11.1 (L) 12.0 - 15.9 g/dL    Hematocrit 34.8 34.0 - 46.6 %    MCV 88.1 79.0 - 97.0 fL    MCH 28.1 26.6 - 33.0 pg    MCHC 31.9 31.5 - 35.7 g/dL    RDW 14.4 12.3 - 15.4 %    RDW-SD 45.4 37.0 - 54.0 fl    MPV 10.2 6.0 - 12.0 fL    Platelets 244 140 - 450 10*3/mm3    Neutrophil % 57.5 42.7 - 76.0 %    Lymphocyte % 28.0 19.6 - 45.3 %    Monocyte % 10.0 5.0 - 12.0 %    Eosinophil % 3.4 0.3 - 6.2 %    Basophil % 0.7 0.0 - 1.5 %    Immature Grans % 0.4 0.0 - 0.5 %    Neutrophils, Absolute 4.28 1.70 - 7.00 10*3/mm3    Lymphocytes, Absolute 2.08 0.70 - 3.10 10*3/mm3    Monocytes, Absolute 0.74 0.10 - 0.90 10*3/mm3    Eosinophils, Absolute 0.25 0.00 - 0.40 10*3/mm3    Basophils, Absolute 0.05 0.00 - 0.20 10*3/mm3    Immature Grans, Absolute 0.03 0.00 - 0.05 10*3/mm3    nRBC 0.0 0.0 - 0.2 /100 WBC   Urinalysis With Microscopic If Indicated (No Culture) - Urine, Clean Catch    Collection Time: 12/07/21  9:13 PM    Specimen: Urine, Clean Catch   Result Value Ref Range    Color, UA Yellow Yellow, Straw    Appearance, UA Clear Clear    pH, UA 5.5 5.0 - 8.0    Specific Gravity, UA 1.020 1.005 - 1.030    Glucose, UA Negative Negative    Ketones, UA Trace (A) Negative     Bilirubin, UA Negative Negative    Blood, UA Negative Negative    Protein, UA Negative Negative    Leuk Esterase, UA Negative Negative    Nitrite, UA Negative Negative    Urobilinogen, UA 0.2 E.U./dL 0.2 - 1.0 E.U./dL       Ordered the above labs and independently reviewed the results.        RADIOLOGY  XR Abdomen 2+ VW with Chest 1 VW    Result Date: 12/7/2021  ACUTE ABDOMEN SERIES  HISTORY: 66-year-old with chest and abdominal pain. Hypertension.  COMPARISON: AP chest 10/18/2021, 2 view chest 10/09/2021.  FINDINGS: Flat and upright abdomen demonstrate no bowel dilatation to suggest obstruction. There is moderate stool within the colon which may represent mild constipation. Cholecystectomy clips are present. AP chest demonstrates no evidence for active disease in the chest. Cardiac monitoring leads are noted. There are several phleboliths within the lower pelvis.      Moderate stool throughout the colon may represent constipation. No evidence for bowel obstruction.  This report was finalized on 12/7/2021 10:14 PM by Dr. Nathaniel Flood M.D.        I ordered the above noted radiological studies. Reviewed by me and discussed with radiologist.  See dictation for official radiology interpretation.      PROCEDURES    Procedures      MEDICATIONS GIVEN IN ER    Medications   aluminum-magnesium hydroxide-simethicone (MAALOX MAX) 400-400-40 MG/5ML suspension 15 mL (15 mL Oral Given 12/7/21 2231)   Lidocaine Viscous HCl (XYLOCAINE) 2 % solution 15 mL (15 mL Mouth/Throat Given 12/7/21 2232)         PROGRESS, DATA ANALYSIS, CONSULTS, AND MEDICAL DECISION MAKING    All labs have been independently reviewed by me.  All radiology studies have been reviewed by me and discussed with radiologist dictating the report.   EKG's independently viewed and interpreted by me.  Discussion below represents my analysis of pertinent findings related to patient's condition, differential diagnosis, treatment plan and final disposition.    DDx:  Includes but not limited to chronic abdominal pain, chronic chest pain, pancreatitis, GERD, gastritis, ACS, NSTEMI, constipation    ED Course as of 12/07/21 2334   Tue Dec 07, 2021   2215 EKG          EKG time: 20:57  Rhythm/Rate: Sinus rhythm at 60 bpm  P waves and MN: Normal  QRS, axis: Normal  ST and T waves: No acute ST/T wave changes    Interpreted Contemporaneously by me, independently viewed  Unchanged compared to prior EKG of 11/26/2021.   [FRANC]   0758 Patient rechecked, resting comfortably in bed, no acute distress.  Discussed results, diagnosis, and treatment plan.  Patient expressed understanding and is requesting discharge. [FRANC]      ED Course User Index  [FRANC] Chris Godoy, PA       HEARTSCORE    History  Highly suspicious              2    Moderately suspicious             1    Slightly or non-suspicious             0    ECG  Significant ST depression              2    Nonspecific repol disturbance            1    Normal                           0    Age  > or = 65                          2     46-65                           1    < or = 45                          0    Risk factors (hypercholesterolemia, HTN, DM, smoking, pos fam hx, obesity)                            > or = to 3 RF for atherosclerotic dx   2    1 or 2                 1    No risk factors                0    Troponin > or = 3x normal limit               2    1-3x normal limit    1    < or = Normal limit    0    Score  0 - 3 is low risk (0.9-1.7% risk of adverse cardiac event)  Score  4 - 6 is moderate risk (12-16.6% risk of adverse cardiac event)  Score  6 - 9 is high risk (50-65% risk of adverse cardiac event)    This patient's HEART score is:  3           MDM: Patient has history of chronic abdominal and chest pain and has had multiple ER visits for the same.  Patient's evaluation today is unremarkable.  EKG, chest x-ray, troponin are all normal, there are no concerning laboratory abnormalities and the patient's vital signs are  normal.  Patient is safe for discharge.  Patient's been given strict return to ER precautions.    PPE: The patient wore a surgical mask throughout the entire patient encounter. I wore an N95.    AS OF 23:34 EST VITALS:    BP - 131/76  HR - 60  TEMP - 97.6 °F (36.4 °C)  O2 SATS - 94%        DIAGNOSIS  Final diagnoses:   Chronic abdominal pain   Atypical chest pain   Constipation, unspecified constipation type   History of dementia   Bipolar affective disorder, remission status unspecified ([facility])   Type 2 diabetes mellitus without complication, without long-term current use of insulin ([facility])   Primary hypertension         DISPOSITION  DISCHARGE    Patient discharged in stable condition.    Reviewed implications of results, diagnosis, meds, responsibility to follow up, warning signs and symptoms of possible worsening, potential complications and reasons to return to ER.    Patient/Family voiced understanding of above instructions.    Discussed plan for discharge, as there is no emergent indication for admission. Patient referred to primary care provider for BP management due to today's BP. Pt/family is agreeable and understands need for follow up and repeat testing.  Pt is aware that discharge does not mean that nothing is wrong but it indicates no emergency is present that requires admission and they must continue care with follow-up as given below or physician of their choice.     FOLLOW-UP  PATIENT CONNECTION - [place] [id number] [phone]  Schedule an appointment as soon as possible for a visit in 1 week           Medication List      New Prescriptions    polyethylene glycol 17 g packet  Commonly known as: MIRALAX  Take 17 g by mouth Daily.           Where to Get Your Medications      These medications were sent to [facility] On license of [facility] AT [facility] & ([facility]AL [facility])  [phone] [facility] [phone]   [address] [place] [address]    Phone:  [phone]   · polyethylene glycol 17 g packet                    Chris Godoy, PA  12/07/21 4543

## 2021-12-08 NOTE — ED PROVIDER NOTES
Brief history of present illness: 66-year-old female presents with episode of infraumbilical abdominal discomfort and poor stooling today as well as some substernal sharp chest pains right after eating.  Symptoms improved at this time.  He was nauseated but no vomiting.    Physical exam:     ED Triage Vitals [12/07/21 1944]   Temp Heart Rate Resp BP SpO2   97.6 °F (36.4 °C) 76 18 135/72 97 %      Temp src Heart Rate Source Patient Position BP Location FiO2 (%)   -- -- -- -- --     Rather poor historian in general.  Evaluation consistent with history of dementia.  No acute distress noted.  Generally poor dentition is appreciated.  Mucous membranes are moist.  Neck is supple without meningismus.  Warm well perfused with strong radial pulse noted.  No respiratory distress or tachypnea.  Nontender chest wall is noted.  Suprapubic abdominal discomfort with palpation without rebound or rigidity.  Remainder of abdominal exam is soft and benign.    MDM: With plan for laboratory and radiologic evaluation of this patient to assess for acute life threats.    I have personally reviewed and interpreted the EKG obtained today in the emergency department at 2057 concomitant with treatment.  EKG reveals rhythm -sinus,  rate 60. IL-CLEMENTINA within normal limits.  QRS-narrow complex ST/T-wave -no STEMI; QTC within normal notes.  Unchanged from prior 11/27/2021.      I have seen and personally evaluated this patient, discussed the case with the treating advanced practice provider, and reviewed their note. I was involved in the medical decision making during the evaluation, testing and disposition planning for this patient.     Kermit Lee MD  12/07/21 2110

## 2021-12-08 NOTE — ED NOTES
Pt in mask throughout encounter. This ERT was in appropriate ppe.       Marc Ace, PCT  12/07/21 2038

## 2022-01-02 ENCOUNTER — HOSPITAL ENCOUNTER (EMERGENCY)
Facility: HOSPITAL | Age: 67
Discharge: LEFT BEFORE DISCHARGE | End: 2022-01-02

## 2022-01-02 VITALS
OXYGEN SATURATION: 97 % | TEMPERATURE: 97.4 F | DIASTOLIC BLOOD PRESSURE: 95 MMHG | SYSTOLIC BLOOD PRESSURE: 156 MMHG | HEART RATE: 77 BPM | HEIGHT: 65 IN | BODY MASS INDEX: 39.77 KG/M2 | RESPIRATION RATE: 18 BRPM

## 2022-01-02 LAB — QT INTERVAL: 410 MS

## 2022-01-02 PROCEDURE — 99211 OFF/OP EST MAY X REQ PHY/QHP: CPT

## 2022-01-02 PROCEDURE — 93010 ELECTROCARDIOGRAM REPORT: CPT | Performed by: INTERNAL MEDICINE

## 2022-01-02 PROCEDURE — 93005 ELECTROCARDIOGRAM TRACING: CPT

## 2022-01-02 NOTE — ED TRIAGE NOTES
Pt reports lower abd. Pain and nausea, pt reports dizziness and hx of vertigo. Pt reports dizziness worse with movement. Symptoms started last night. Pt went to [facility] this morning and [facility]BS due to wait time.     Pt arrives in triage with mask on. Triage staff wearing N95 masks and goggles.

## 2022-04-19 ENCOUNTER — HOSPITAL ENCOUNTER (EMERGENCY)
Facility: HOSPITAL | Age: 67
Discharge: HOME | End: 2022-04-19
Attending: EMERGENCY MEDICINE | Admitting: EMERGENCY MEDICINE

## 2022-04-19 VITALS
TEMPERATURE: 98.8 F | HEIGHT: 65 IN | DIASTOLIC BLOOD PRESSURE: 69 MMHG | RESPIRATION RATE: 16 BRPM | OXYGEN SATURATION: 90 % | WEIGHT: 259 LBS | HEART RATE: 72 BPM | SYSTOLIC BLOOD PRESSURE: 119 MMHG | BODY MASS INDEX: 43.15 KG/M2

## 2022-04-19 DIAGNOSIS — R10.9 ABDOMINAL PAIN, UNSPECIFIED ABDOMINAL LOCATION: ICD-10-CM

## 2022-04-19 DIAGNOSIS — R42 VERTIGO: Primary | ICD-10-CM

## 2022-04-19 DIAGNOSIS — I10 PRIMARY HYPERTENSION: ICD-10-CM

## 2022-04-19 DIAGNOSIS — E11.9 TYPE 2 DIABETES MELLITUS WITHOUT COMPLICATION, WITHOUT LONG-TERM CURRENT USE OF INSULIN: ICD-10-CM

## 2022-04-19 LAB
ALBUMIN SERPL-MCNC: 3.6 G/DL (ref 3.5–5.2)
ALBUMIN/GLOB SERPL: 1.4 G/DL
ALP SERPL-CCNC: 84 U/L (ref 39–117)
ALT SERPL W P-5'-P-CCNC: 25 U/L (ref 1–33)
ANION GAP SERPL CALCULATED.3IONS-SCNC: 11 MMOL/L (ref 5–15)
AST SERPL-CCNC: 27 U/L (ref 1–32)
BASOPHILS # BLD AUTO: 0.05 10*3/MM3 (ref 0–0.2)
BASOPHILS NFR BLD AUTO: 0.8 % (ref 0–1.5)
BILIRUB SERPL-MCNC: 0.3 MG/DL (ref 0–1.2)
BUN SERPL-MCNC: 15 MG/DL (ref 8–23)
BUN/CREAT SERPL: 18.1 (ref 7–25)
CALCIUM SPEC-SCNC: 8.7 MG/DL (ref 8.6–10.5)
CHLORIDE SERPL-SCNC: 104 MMOL/L (ref 98–107)
CO2 SERPL-SCNC: 26 MMOL/L (ref 22–29)
CREAT SERPL-MCNC: 0.83 MG/DL (ref 0.57–1)
DEPRECATED RDW RBC AUTO: 41.5 FL (ref 37–54)
EGFRCR SERPLBLD CKD-EPI 2021: 76.9 ML/MIN/1.73
EOSINOPHIL # BLD AUTO: 0.16 10*3/MM3 (ref 0–0.4)
EOSINOPHIL NFR BLD AUTO: 2.6 % (ref 0.3–6.2)
ERYTHROCYTE [DISTWIDTH] IN BLOOD BY AUTOMATED COUNT: 14.3 % (ref 12.3–15.4)
GLOBULIN UR ELPH-MCNC: 2.5 GM/DL
GLUCOSE SERPL-MCNC: 123 MG/DL (ref 65–99)
HCT VFR BLD AUTO: 32.3 % (ref 34–46.6)
HGB BLD-MCNC: 10.3 G/DL (ref 12–15.9)
IMM GRANULOCYTES # BLD AUTO: 0.01 10*3/MM3 (ref 0–0.05)
IMM GRANULOCYTES NFR BLD AUTO: 0.2 % (ref 0–0.5)
LIPASE SERPL-CCNC: 31 U/L (ref 13–60)
LYMPHOCYTES # BLD AUTO: 1.97 10*3/MM3 (ref 0.7–3.1)
LYMPHOCYTES NFR BLD AUTO: 31.8 % (ref 19.6–45.3)
MCH RBC QN AUTO: 26 PG (ref 26.6–33)
MCHC RBC AUTO-ENTMCNC: 31.9 G/DL (ref 31.5–35.7)
MCV RBC AUTO: 81.6 FL (ref 79–97)
MONOCYTES # BLD AUTO: 0.72 10*3/MM3 (ref 0.1–0.9)
MONOCYTES NFR BLD AUTO: 11.6 % (ref 5–12)
NEUTROPHILS NFR BLD AUTO: 3.28 10*3/MM3 (ref 1.7–7)
NEUTROPHILS NFR BLD AUTO: 53 % (ref 42.7–76)
NRBC BLD AUTO-RTO: 0 /100 WBC (ref 0–0.2)
PLATELET # BLD AUTO: 256 10*3/MM3 (ref 140–450)
PMV BLD AUTO: 10.1 FL (ref 6–12)
POTASSIUM SERPL-SCNC: 4.2 MMOL/L (ref 3.5–5.2)
PROT SERPL-MCNC: 6.1 G/DL (ref 6–8.5)
RBC # BLD AUTO: 3.96 10*6/MM3 (ref 3.77–5.28)
SODIUM SERPL-SCNC: 141 MMOL/L (ref 136–145)
WBC NRBC COR # BLD: 6.19 10*3/MM3 (ref 3.4–10.8)

## 2022-04-19 PROCEDURE — 96375 TX/PRO/DX INJ NEW DRUG ADDON: CPT

## 2022-04-19 PROCEDURE — 96374 THER/PROPH/DIAG INJ IV PUSH: CPT

## 2022-04-19 PROCEDURE — 25010000002 DIAZEPAM PER 5 MG: Performed by: EMERGENCY MEDICINE

## 2022-04-19 PROCEDURE — 99283 EMERGENCY DEPT VISIT LOW MDM: CPT

## 2022-04-19 PROCEDURE — 85025 COMPLETE CBC W/AUTO DIFF WBC: CPT | Performed by: EMERGENCY MEDICINE

## 2022-04-19 PROCEDURE — 25010000002 ONDANSETRON PER 1 MG: Performed by: EMERGENCY MEDICINE

## 2022-04-19 PROCEDURE — 80053 COMPREHEN METABOLIC PANEL: CPT | Performed by: EMERGENCY MEDICINE

## 2022-04-19 PROCEDURE — 83690 ASSAY OF LIPASE: CPT | Performed by: EMERGENCY MEDICINE

## 2022-04-19 RX ORDER — DIAZEPAM 5 MG/ML
5 INJECTION, SOLUTION INTRAMUSCULAR; INTRAVENOUS ONCE
Status: COMPLETED | OUTPATIENT
Start: 2022-04-19 | End: 2022-04-19

## 2022-04-19 RX ORDER — ONDANSETRON 2 MG/ML
4 INJECTION INTRAMUSCULAR; INTRAVENOUS ONCE
Status: COMPLETED | OUTPATIENT
Start: 2022-04-19 | End: 2022-04-19

## 2022-04-19 RX ORDER — SODIUM CHLORIDE 0.9 % (FLUSH) 0.9 %
10 SYRINGE (ML) INJECTION AS NEEDED
Status: DISCONTINUED | OUTPATIENT
Start: 2022-04-19 | End: 2022-04-19 | Stop reason: HOSPADM

## 2022-04-19 RX ORDER — ONDANSETRON 4 MG/1
4 TABLET, ORALLY DISINTEGRATING ORAL EVERY 8 HOURS PRN
Qty: 12 TABLET | Refills: 0 | Status: ON HOLD | OUTPATIENT
Start: 2022-04-19 | End: 2023-03-07

## 2022-04-19 RX ADMIN — SODIUM CHLORIDE 500 ML: 9 INJECTION, SOLUTION INTRAVENOUS at 04:01

## 2022-04-19 RX ADMIN — ONDANSETRON 4 MG: 2 INJECTION INTRAMUSCULAR; INTRAVENOUS at 04:02

## 2022-04-19 RX ADMIN — DIAZEPAM 5 MG: 5 INJECTION INTRAMUSCULAR; INTRAVENOUS at 04:33

## 2022-04-19 NOTE — ED NOTES
.Patient was placed in face mask in first look. Patient was wearing facemask when I entered the room and throughout our encounter. I wore full protective equipment throughout this patient encounter including a N95, eyewear  and gloves. Hand hygiene was performed before donning protective equipment and after removal when leaving the room.

## 2022-04-19 NOTE — ED PROVIDER NOTES
MD ATTESTATION NOTE    The JOSE and I have discussed this patient's history, physical exam, and treatment plan.    I provided a substantive portion of the care of this patient. I personally performed the physical exam, in its entirety. The attached note describes my personal findings.      Krupa Mcmanus is a 66 y.o. female who presents to the ED c/o diffuse abdominal pain as well as vertigo.  No shortness of breath no chest pain no fevers or chills.  States that current vertigo episode feels like prior episodes.  She took meclizine at home which did not improve her symptoms.      On exam:  GENERAL: not distressed  HENT: nares patent  EYES: no scleral icterus  CV: regular rhythm, regular rate  RESPIRATORY: normal effort  ABDOMEN: soft, bilateral lower quadrant tenderness there is no rebound or guarding  MUSCULOSKELETAL: no deformity  NEURO: alert, moves all extremities, follows commands  SKIN: warm, dry    Labs  Recent Results (from the past 24 hour(s))   POCT GLUCOSE FINGERSTICK    Collection Time: 04/18/22  7:50 PM    Specimen: Blood   Result Value Ref Range    Glucose 119 (H) 70 - 110 mg/dL    Performed by: 650,325,117     Device 160,012,914,115    Comprehensive Metabolic Panel    Collection Time: 04/19/22  3:59 AM    Specimen: Blood   Result Value Ref Range    Glucose 123 (H) 65 - 99 mg/dL    BUN 15 8 - 23 mg/dL    Creatinine 0.83 0.57 - 1.00 mg/dL    Sodium 141 136 - 145 mmol/L    Potassium 4.2 3.5 - 5.2 mmol/L    Chloride 104 98 - 107 mmol/L    CO2 26.0 22.0 - 29.0 mmol/L    Calcium 8.7 8.6 - 10.5 mg/dL    Total Protein 6.1 6.0 - 8.5 g/dL    Albumin 3.60 3.50 - 5.20 g/dL    ALT (SGPT) 25 1 - 33 U/L    AST (SGOT) 27 1 - 32 U/L    Alkaline Phosphatase 84 39 - 117 U/L    Total Bilirubin 0.3 0.0 - 1.2 mg/dL    Globulin 2.5 gm/dL    A/G Ratio 1.4 g/dL    BUN/Creatinine Ratio 18.1 7.0 - 25.0    Anion Gap 11.0 5.0 - 15.0 mmol/L    eGFR 76.9 >60.0 mL/min/1.73   Lipase    Collection Time: 04/19/22  3:59 AM     Specimen: Blood   Result Value Ref Range    Lipase 31 13 - 60 U/L   CBC Auto Differential    Collection Time: 04/19/22  3:59 AM    Specimen: Blood   Result Value Ref Range    WBC 6.19 3.40 - 10.80 10*3/mm3    RBC 3.96 3.77 - 5.28 10*6/mm3    Hemoglobin 10.3 (L) 12.0 - 15.9 g/dL    Hematocrit 32.3 (L) 34.0 - 46.6 %    MCV 81.6 79.0 - 97.0 fL    MCH 26.0 (L) 26.6 - 33.0 pg    MCHC 31.9 31.5 - 35.7 g/dL    RDW 14.3 12.3 - 15.4 %    RDW-SD 41.5 37.0 - 54.0 fl    MPV 10.1 6.0 - 12.0 fL    Platelets 256 140 - 450 10*3/mm3    Neutrophil % 53.0 42.7 - 76.0 %    Lymphocyte % 31.8 19.6 - 45.3 %    Monocyte % 11.6 5.0 - 12.0 %    Eosinophil % 2.6 0.3 - 6.2 %    Basophil % 0.8 0.0 - 1.5 %    Immature Grans % 0.2 0.0 - 0.5 %    Neutrophils, Absolute 3.28 1.70 - 7.00 10*3/mm3    Lymphocytes, Absolute 1.97 0.70 - 3.10 10*3/mm3    Monocytes, Absolute 0.72 0.10 - 0.90 10*3/mm3    Eosinophils, Absolute 0.16 0.00 - 0.40 10*3/mm3    Basophils, Absolute 0.05 0.00 - 0.20 10*3/mm3    Immature Grans, Absolute 0.01 0.00 - 0.05 10*3/mm3    nRBC 0.0 0.0 - 0.2 /100 WBC       Radiology  No Radiology Exams Resulted Within Past 24 Hours    Medical Decision Making:  ED Course as of 04/19/22 0545   Tue Apr 19, 2022   0511 WBC: 6.19 [FRANC]   0511 Hemoglobin(!): 10.3 [FRANC]   0511 Hematocrit(!): 32.3 [FRANC]   0511 Platelets: 256 [FRANC]   0511 Glucose(!): 123 [FRANC]   0511 BUN: 15 [FRANC]   0511 Creatinine: 0.83 [FRANC]   0511 Sodium: 141 [FRANC]   0511 Potassium: 4.2 [FRANC]   0511 Chloride: 104 [FRANC]   0511 CO2: 26.0 [FRANC]   0512 ALT (SGPT): 25 [FRANC]   0512 AST (SGOT): 27 [FRANC]   0512 Alkaline Phosphatase: 84 [FRANC]   0512 Lipase: 31 [FRANC]   0517 Patient rechecked, resting comfortably in bed, no acute distress.  Symptoms have improved. [FRANC]   0532 Patient at the door requesting discharge. [FRANC]      ED Course User Index  [FRANC] Chris Godoy PA           PPE: Both the patient and I wore a surgical mask throughout the entire patient encounter. I wore protective goggles.      Diagnosis  Final diagnoses:   Vertigo   Abdominal pain, unspecified abdominal location   Primary hypertension   Type 2 diabetes mellitus without complication, without long-term current use of insulin (HCC)        Alejandro Calles MD  04/19/22 9664

## 2022-04-19 NOTE — ED NOTES
Patient from home via EMS reporting generalized abdominal pain that started around 1700 yesterday after patient ate a [employer] sandwich.

## 2022-04-19 NOTE — ED NOTES
Pt reports abdominal pain that started after she ate dinner last night at about 1700. Pt reports nausea but no vomiting. She has also been feeling dizzy. Pt has a hx of vertigo. Pt reports a 9/10 pain in her lower abdomen. Pt says it is a stabbing pain. Pt denies any urinary symptoms. Pt's last bowel movement was yesterday. Pt fell asleep during the examination.

## 2022-04-19 NOTE — ED PROVIDER NOTES
" EMERGENCY DEPARTMENT ENCOUNTER    Room Number:  03/03  Date of encounter:  4/19/2022  PCP: Akosua Staley APRN  Historian: Patient      HPI:  Chief Complaint: Vertigo   A complete HPI/ROS/PMH/PSH/SH/FH are unobtainable due to: N/A    Context: Krupa Mcmanus is a 66 y.o. female past medical history of HTN, T2DM, GERD, anxiety, depression, and prior episodes of vertigo who presents to the ED c/o \"I just got real dizzy, my ear started ringing, and I became nauseated\".  Patient states that she took some \"mescaline\" and Maalox at home without relief.  Patient's symptoms are also associated with abdominal pain, but she denies any headache, shortness of breath, or chest pain.  This episode of vertigo is identical to previous episodes that she has had in the past.      PAST MEDICAL HISTORY  Active Ambulatory Problems     Diagnosis Date Noted   • Affective psychosis, bipolar (HCC) 07/11/2018   • Essential hypertension 07/12/2018   • Mild intermittent asthma without complication 07/12/2018   • Hypokalemia 07/12/2018   • Leukocytosis 07/12/2018   • Elevated liver enzymes 07/12/2018   • Type 2 diabetes mellitus (HCC) 07/12/2018     Resolved Ambulatory Problems     Diagnosis Date Noted   • No Resolved Ambulatory Problems     Past Medical History:   Diagnosis Date   • Alzheimer disease (HCC)    • Anxiety    • Bipolar 1 disorder (HCC)    • Dementia (HCC)    • Depression    • Diabetes mellitus (HCC)    • GERD (gastroesophageal reflux disease)    • OCD (obsessive compulsive disorder)    • Rheumatoid arthritis (HCC)    • UTI (urinary tract infection)          PAST SURGICAL HISTORY  Past Surgical History:   Procedure Laterality Date   • CHOLECYSTECTOMY     • HYSTERECTOMY           FAMILY HISTORY  Family History   Problem Relation Age of Onset   • Cancer Father         unknown         SOCIAL HISTORY  Social History     Socioeconomic History   • Marital status:    Tobacco Use   • Smoking status: Former Smoker   Substance " and Sexual Activity   • Alcohol use: Never         ALLERGIES  Amoxicillin, Benadryl [diphenhydramine], Ceclor [cefaclor], Penicillins, and Zyprexa [olanzapine]        REVIEW OF SYSTEMS  Review of Systems     All systems reviewed and negative except for those discussed in HPI.       PHYSICAL EXAM    I have reviewed the triage vital signs and nursing notes.    ED Triage Vitals   Temp Heart Rate Resp BP SpO2   04/19/22 0245 04/19/22 0245 04/19/22 0245 04/19/22 0245 04/19/22 0245   98.8 °F (37.1 °C) 65 16 129/69 95 %      Temp src Heart Rate Source Patient Position BP Location FiO2 (%)   -- 04/19/22 0300 04/19/22 0245 04/19/22 0245 --    Monitor Lying Left arm        Physical Exam  GENERAL: Alert and oriented x3, obese, not distressed  HENT: dry mucous membranes  EYES: no scleral icterus, PERRL, EOMI, no nystagmus  CV: regular rhythm, regular rate  RESPIRATORY: normal effort  ABDOMEN: soft/nontender, no rebound or guarding  MUSCULOSKELETAL: no deformity  NEURO: alert, moves all extremities, no focal neurodeficits, follows commands  SKIN: warm, dry        LAB RESULTS  Recent Results (from the past 24 hour(s))   POCT GLUCOSE FINGERSTICK    Collection Time: 04/18/22  7:50 PM    Specimen: Blood   Result Value Ref Range    Glucose 119 (H) 70 - 110 mg/dL    Performed by: 650,325,117     Device 160,012,914,115    Lipase    Collection Time: 04/19/22  3:59 AM    Specimen: Blood   Result Value Ref Range    Lipase 31 13 - 60 U/L   CBC Auto Differential    Collection Time: 04/19/22  3:59 AM    Specimen: Blood   Result Value Ref Range    WBC 6.19 3.40 - 10.80 10*3/mm3    RBC 3.96 3.77 - 5.28 10*6/mm3    Hemoglobin 10.3 (L) 12.0 - 15.9 g/dL    Hematocrit 32.3 (L) 34.0 - 46.6 %    MCV 81.6 79.0 - 97.0 fL    MCH 26.0 (L) 26.6 - 33.0 pg    MCHC 31.9 31.5 - 35.7 g/dL    RDW 14.3 12.3 - 15.4 %    RDW-SD 41.5 37.0 - 54.0 fl    MPV 10.1 6.0 - 12.0 fL    Platelets 256 140 - 450 10*3/mm3    Neutrophil % 53.0 42.7 - 76.0 %    Lymphocyte % 31.8  19.6 - 45.3 %    Monocyte % 11.6 5.0 - 12.0 %    Eosinophil % 2.6 0.3 - 6.2 %    Basophil % 0.8 0.0 - 1.5 %    Immature Grans % 0.2 0.0 - 0.5 %    Neutrophils, Absolute 3.28 1.70 - 7.00 10*3/mm3    Lymphocytes, Absolute 1.97 0.70 - 3.10 10*3/mm3    Monocytes, Absolute 0.72 0.10 - 0.90 10*3/mm3    Eosinophils, Absolute 0.16 0.00 - 0.40 10*3/mm3    Basophils, Absolute 0.05 0.00 - 0.20 10*3/mm3    Immature Grans, Absolute 0.01 0.00 - 0.05 10*3/mm3    nRBC 0.0 0.0 - 0.2 /100 WBC       Ordered the above labs and independently reviewed the results.        RADIOLOGY  No Radiology Exams Resulted Within Past 24 Hours    I ordered the above noted radiological studies. Reviewed by me and discussed with radiologist.  See dictation for official radiology interpretation.      PROCEDURES    Procedures      MEDICATIONS GIVEN IN ER    Medications   sodium chloride 0.9 % flush 10 mL (has no administration in time range)   sodium chloride 0.9 % bolus 500 mL (500 mL Intravenous New Bag 4/19/22 0401)   ondansetron (ZOFRAN) injection 4 mg (4 mg Intravenous Given 4/19/22 0402)   diazePAM (VALIUM) injection 5 mg (5 mg Intravenous Given 4/19/22 0433)         PROGRESS, DATA ANALYSIS, CONSULTS, AND MEDICAL DECISION MAKING    All labs have been independently reviewed by me.  All radiology studies have been reviewed by me and discussed with radiologist dictating the report.   EKG's independently viewed and interpreted by me.  Discussion below represents my analysis of pertinent findings related to patient's condition, differential diagnosis, treatment plan and final disposition.    DDx: Includes but not limited to vertigo, BPPV, labyrinthitis, gastroenteritis    ED Course as of 04/19/22 0543   Tue Apr 19, 2022   0511 WBC: 6.19 [FRANC]   0511 Hemoglobin(!): 10.3 [FRANC]   0511 Hematocrit(!): 32.3 [FRANC]   0511 Platelets: 256 [FRANC]   0511 Glucose(!): 123 [FRANC]   0511 BUN: 15 [FRANC]   0511 Creatinine: 0.83 [FRANC]   0511 Sodium: 141 [FRANC]   0511 Potassium: 4.2  [FRANC]   0511 Chloride: 104 [FRANC]   0511 CO2: 26.0 [FRANC]   0512 ALT (SGPT): 25 [FRANC]   0512 AST (SGOT): 27 [FRANC]   0512 Alkaline Phosphatase: 84 [FRANC]   0512 Lipase: 31 [FRANC]   0517 Patient rechecked, resting comfortably in bed, no acute distress.  Symptoms have improved. [FRANC]   0532 Patient at the door requesting discharge. [FRANC]      ED Course User Index  [FRANC] Chris Godoy PA       MDM: Patient presented to the ER with her typical vertigo episode, there is no focal neurodeficit, or other worrisome symptoms and her symptoms have improved with Valium and Zofran.  Blood work is unremarkable and shows no acute abnormality.  Vital signs are stable, patient is safe for discharge home.    PPE: The patient wore a surgical mask throughout the entire patient encounter. I wore an N95.    AS OF 04:34 EDT VITALS:    BP - 126/59  HR - 59  TEMP - 98.8 °F (37.1 °C)  O2 SATS - 97%        DIAGNOSIS  Final diagnoses:   Vertigo   Abdominal pain, unspecified abdominal location         DISPOSITION  DISCHARGE    Patient discharged in stable condition.    Reviewed implications of results, diagnosis, meds, responsibility to follow up, warning signs and symptoms of possible worsening, potential complications and reasons to return to ER.    Patient/Family voiced understanding of above instructions.    Discussed plan for discharge, as there is no emergent indication for admission. Patient referred to primary care provider for BP management due to today's BP. Pt/family is agreeable and understands need for follow up and repeat testing.  Pt is aware that discharge does not mean that nothing is wrong but it indicates no emergency is present that requires admission and they must continue care with follow-up as given below or physician of their choice.     FOLLOW-UP  Akosua Staley, DERRICK  [address] [facility] [address]  [place] [id number] [phone]    Schedule an appointment as soon as possible for a visit in 2 days           Medication List      New  Prescriptions    ondansetron ODT 4 MG disintegrating tablet  Commonly known as: Zofran ODT  Place 1 tablet on the tongue Every 8 (Eight) Hours As Needed for Nausea.           Where to Get Your Medications      These medications were sent to [facility] [address] [place] [address] [id number] [facility] ([facility]) - [phone]  - [phone]   [address] [place] [address], [place] [address]    Phone: [phone]   · ondansetron ODT 4 MG disintegrating tablet                    Chris Godoy PA  04/19/22 0552

## 2022-05-13 ENCOUNTER — APPOINTMENT (OUTPATIENT)
Dept: GENERAL RADIOLOGY | Facility: HOSPITAL | Age: 67
End: 2022-05-13

## 2022-05-13 ENCOUNTER — HOSPITAL ENCOUNTER (EMERGENCY)
Facility: HOSPITAL | Age: 67
Discharge: HOME | End: 2022-05-13
Attending: EMERGENCY MEDICINE | Admitting: EMERGENCY MEDICINE

## 2022-05-13 VITALS
TEMPERATURE: 98.1 F | SYSTOLIC BLOOD PRESSURE: 141 MMHG | OXYGEN SATURATION: 97 % | DIASTOLIC BLOOD PRESSURE: 89 MMHG | RESPIRATION RATE: 16 BRPM | HEART RATE: 60 BPM

## 2022-05-13 DIAGNOSIS — R10.9 ACUTE ABDOMINAL PAIN: Primary | ICD-10-CM

## 2022-05-13 DIAGNOSIS — K44.9 HIATAL HERNIA: ICD-10-CM

## 2022-05-13 DIAGNOSIS — R07.89 ATYPICAL CHEST PAIN: ICD-10-CM

## 2022-05-13 LAB
ALBUMIN SERPL-MCNC: 3.5 G/DL (ref 3.5–5.2)
ALBUMIN/GLOB SERPL: 1 G/DL
ALP SERPL-CCNC: 92 U/L (ref 39–117)
ALT SERPL W P-5'-P-CCNC: 32 U/L (ref 1–33)
ANION GAP SERPL CALCULATED.3IONS-SCNC: 8.8 MMOL/L (ref 5–15)
AST SERPL-CCNC: 32 U/L (ref 1–32)
BASOPHILS # BLD AUTO: 0.03 10*3/MM3 (ref 0–0.2)
BASOPHILS NFR BLD AUTO: 0.5 % (ref 0–1.5)
BILIRUB SERPL-MCNC: 0.4 MG/DL (ref 0–1.2)
BUN SERPL-MCNC: 16 MG/DL (ref 8–23)
BUN/CREAT SERPL: 22.5 (ref 7–25)
CALCIUM SPEC-SCNC: 9.4 MG/DL (ref 8.6–10.5)
CHLORIDE SERPL-SCNC: 101 MMOL/L (ref 98–107)
CO2 SERPL-SCNC: 28.2 MMOL/L (ref 22–29)
CREAT SERPL-MCNC: 0.71 MG/DL (ref 0.57–1)
DEPRECATED RDW RBC AUTO: 41.7 FL (ref 37–54)
EGFRCR SERPLBLD CKD-EPI 2021: 92.7 ML/MIN/1.73
EOSINOPHIL # BLD AUTO: 0.15 10*3/MM3 (ref 0–0.4)
EOSINOPHIL NFR BLD AUTO: 2.6 % (ref 0.3–6.2)
ERYTHROCYTE [DISTWIDTH] IN BLOOD BY AUTOMATED COUNT: 14.5 % (ref 12.3–15.4)
GLOBULIN UR ELPH-MCNC: 3.4 GM/DL
GLUCOSE SERPL-MCNC: 129 MG/DL (ref 65–99)
HCT VFR BLD AUTO: 32.5 % (ref 34–46.6)
HGB BLD-MCNC: 10.6 G/DL (ref 12–15.9)
IMM GRANULOCYTES # BLD AUTO: 0.02 10*3/MM3 (ref 0–0.05)
IMM GRANULOCYTES NFR BLD AUTO: 0.3 % (ref 0–0.5)
LIPASE SERPL-CCNC: 24 U/L (ref 13–60)
LYMPHOCYTES # BLD AUTO: 1.25 10*3/MM3 (ref 0.7–3.1)
LYMPHOCYTES NFR BLD AUTO: 21.9 % (ref 19.6–45.3)
MCH RBC QN AUTO: 25.9 PG (ref 26.6–33)
MCHC RBC AUTO-ENTMCNC: 32.6 G/DL (ref 31.5–35.7)
MCV RBC AUTO: 79.5 FL (ref 79–97)
MONOCYTES # BLD AUTO: 0.46 10*3/MM3 (ref 0.1–0.9)
MONOCYTES NFR BLD AUTO: 8 % (ref 5–12)
NEUTROPHILS NFR BLD AUTO: 3.81 10*3/MM3 (ref 1.7–7)
NEUTROPHILS NFR BLD AUTO: 66.7 % (ref 42.7–76)
NRBC BLD AUTO-RTO: 0 /100 WBC (ref 0–0.2)
NT-PROBNP SERPL-MCNC: 48.1 PG/ML (ref 0–900)
PLATELET # BLD AUTO: 309 10*3/MM3 (ref 140–450)
PMV BLD AUTO: 9.4 FL (ref 6–12)
POTASSIUM SERPL-SCNC: 4.2 MMOL/L (ref 3.5–5.2)
PROT SERPL-MCNC: 6.9 G/DL (ref 6–8.5)
QT INTERVAL: 425 MS
RBC # BLD AUTO: 4.09 10*6/MM3 (ref 3.77–5.28)
SODIUM SERPL-SCNC: 138 MMOL/L (ref 136–145)
TROPONIN T SERPL-MCNC: <0.01 NG/ML (ref 0–0.03)
WBC NRBC COR # BLD: 5.72 10*3/MM3 (ref 3.4–10.8)

## 2022-05-13 PROCEDURE — 84484 ASSAY OF TROPONIN QUANT: CPT | Performed by: EMERGENCY MEDICINE

## 2022-05-13 PROCEDURE — 93005 ELECTROCARDIOGRAM TRACING: CPT

## 2022-05-13 PROCEDURE — 99284 EMERGENCY DEPT VISIT MOD MDM: CPT

## 2022-05-13 PROCEDURE — 83880 ASSAY OF NATRIURETIC PEPTIDE: CPT | Performed by: EMERGENCY MEDICINE

## 2022-05-13 PROCEDURE — 83690 ASSAY OF LIPASE: CPT | Performed by: EMERGENCY MEDICINE

## 2022-05-13 PROCEDURE — 93010 ELECTROCARDIOGRAM REPORT: CPT | Performed by: INTERNAL MEDICINE

## 2022-05-13 PROCEDURE — 80053 COMPREHEN METABOLIC PANEL: CPT | Performed by: EMERGENCY MEDICINE

## 2022-05-13 PROCEDURE — 71045 X-RAY EXAM CHEST 1 VIEW: CPT

## 2022-05-13 PROCEDURE — 85025 COMPLETE CBC W/AUTO DIFF WBC: CPT | Performed by: EMERGENCY MEDICINE

## 2022-05-13 RX ORDER — LIDOCAINE HYDROCHLORIDE 20 MG/ML
15 SOLUTION OROPHARYNGEAL ONCE
Status: COMPLETED | OUTPATIENT
Start: 2022-05-13 | End: 2022-05-13

## 2022-05-13 RX ORDER — ALUMINA, MAGNESIA, AND SIMETHICONE 2400; 2400; 240 MG/30ML; MG/30ML; MG/30ML
15 SUSPENSION ORAL ONCE
Status: COMPLETED | OUTPATIENT
Start: 2022-05-13 | End: 2022-05-13

## 2022-05-13 RX ORDER — SODIUM CHLORIDE 0.9 % (FLUSH) 0.9 %
10 SYRINGE (ML) INJECTION AS NEEDED
Status: DISCONTINUED | OUTPATIENT
Start: 2022-05-13 | End: 2022-05-13 | Stop reason: HOSPADM

## 2022-05-13 RX ORDER — MECLIZINE HYDROCHLORIDE 25 MG/1
25 TABLET ORAL ONCE
Status: COMPLETED | OUTPATIENT
Start: 2022-05-13 | End: 2022-05-13

## 2022-05-13 RX ADMIN — LIDOCAINE HYDROCHLORIDE 15 ML: 20 SOLUTION ORAL; TOPICAL at 08:52

## 2022-05-13 RX ADMIN — MAGNESIUM HYDROXIDE,ALUMINUM HYDROXICE,SIMETHICONE 15 ML: 240; 2400; 2400 SUSPENSION ORAL at 08:52

## 2022-05-13 RX ADMIN — MECLIZINE HYDROCHLORIDE 25 MG: 25 TABLET ORAL at 08:51

## 2022-05-13 NOTE — ED PROVIDER NOTES
EMERGENCY DEPARTMENT ENCOUNTER    Room Number:  33/33  Date of encounter:  5/13/2022  PCP: Akosua Staley APRN  Patient Care Team:  Akosua Staley APRN as PCP - General (Nurse Practitioner)   Historian: Patient, EMS    HPI:  Chief Complaint: Abdominal pain, chest pain, vertigo  A complete HPI/ROS/PMH/PSH/SH/FH are unobtainable due to: Nothing    Context: Krupa Mcmanus is a 66 y.o. female who presents to the ED c/o having abdominal pain, chest pain, and vertigo.  She reports that around 3 AM she developed upper abdominal pain and some central chest pain.  She states is been constant ever since.  She denies nausea, shortness of breath, or diaphoresis.  She reports she has had these symptoms many times in the past.  She states in the past she has had to come to the hospital and received a GI cocktail.  She called her primary care doctor who told her to take her pantoprazole.  She did but it did not seem to improve her symptoms.  She also reports a history of vertigo.  She states she last took meclizine yesterday.  She states meclizine usually helps.  It helped last night but her symptoms have returned this morning.  She was given aspirin by EMS.  She states that she has had these symptoms many times in the past and is come to the hospital many times in the past for them.    Prior record review: Many prior visits for the same including 4/19/2022, 12/7/2021, 11/27/2021, 11/26/2021    PAST MEDICAL HISTORY  Active Ambulatory Problems     Diagnosis Date Noted   • Affective psychosis, bipolar (HCC) 07/11/2018   • Essential hypertension 07/12/2018   • Mild intermittent asthma without complication 07/12/2018   • Hypokalemia 07/12/2018   • Leukocytosis 07/12/2018   • Elevated liver enzymes 07/12/2018   • Type 2 diabetes mellitus (HCC) 07/12/2018     Resolved Ambulatory Problems     Diagnosis Date Noted   • No Resolved Ambulatory Problems     Past Medical History:   Diagnosis Date   • Alzheimer disease (HCC)    •  Anxiety    • Bipolar 1 disorder (HCC)    • Dementia (HCC)    • Depression    • Diabetes mellitus (HCC)    • GERD (gastroesophageal reflux disease)    • OCD (obsessive compulsive disorder)    • Rheumatoid arthritis (HCC)    • UTI (urinary tract infection)        The patient has started, but not completed, their COVID-19 vaccination series.    PAST SURGICAL HISTORY  Past Surgical History:   Procedure Laterality Date   • CHOLECYSTECTOMY     • HYSTERECTOMY           FAMILY HISTORY  Family History   Problem Relation Age of Onset   • Cancer Father         unknown         SOCIAL HISTORY  Social History     Socioeconomic History   • Marital status:    Tobacco Use   • Smoking status: Former Smoker   Substance and Sexual Activity   • Alcohol use: Never         ALLERGIES  Amoxicillin, Benadryl [diphenhydramine], Ceclor [cefaclor], Penicillins, and Zyprexa [olanzapine]        REVIEW OF SYSTEMS  Review of Systems   Other chest pain, positive Delfin pain, negative nausea, negative vomiting, negative fever, negative chills, negative shortness of breath, negative palpitations  All systems reviewed and negative except for those discussed in HPI.       PHYSICAL EXAM    I have reviewed the triage vital signs and nursing notes.    ED Triage Vitals [05/13/22 0803]   Temp Heart Rate Resp BP SpO2   98.1 °F (36.7 °C) 62 16 124/75 96 %      Temp src Heart Rate Source Patient Position BP Location FiO2 (%)   Oral Monitor -- -- --       Physical Exam  GENERAL: Awake, alert, no acute distress  SKIN: Warm, dry  HENT: Normocephalic, atraumatic  EYES: no scleral icterus  CV: regular rhythm, regular rate  RESPIRATORY: normal effort, lungs clear  ABDOMEN: soft, mild epigastric tenderness, nondistended  MUSCULOSKELETAL: no deformity no calf tenderness or swelling  NEURO: alert, moves all extremities, follows commands          LAB RESULTS  Recent Results (from the past 24 hour(s))   ECG 12 Lead    Collection Time: 05/13/22  8:13 AM   Result  Value Ref Range    QT Interval 425 ms   Comprehensive Metabolic Panel    Collection Time: 05/13/22  9:33 AM    Specimen: Blood   Result Value Ref Range    Glucose 129 (H) 65 - 99 mg/dL    BUN 16 8 - 23 mg/dL    Creatinine 0.71 0.57 - 1.00 mg/dL    Sodium 138 136 - 145 mmol/L    Potassium 4.2 3.5 - 5.2 mmol/L    Chloride 101 98 - 107 mmol/L    CO2 28.2 22.0 - 29.0 mmol/L    Calcium 9.4 8.6 - 10.5 mg/dL    Total Protein 6.9 6.0 - 8.5 g/dL    Albumin 3.50 3.50 - 5.20 g/dL    ALT (SGPT) 32 1 - 33 U/L    AST (SGOT) 32 1 - 32 U/L    Alkaline Phosphatase 92 39 - 117 U/L    Total Bilirubin 0.4 0.0 - 1.2 mg/dL    Globulin 3.4 gm/dL    A/G Ratio 1.0 g/dL    BUN/Creatinine Ratio 22.5 7.0 - 25.0    Anion Gap 8.8 5.0 - 15.0 mmol/L    eGFR 92.7 >60.0 mL/min/1.73   BNP    Collection Time: 05/13/22  9:33 AM    Specimen: Blood   Result Value Ref Range    proBNP 48.1 0.0 - 900.0 pg/mL   Troponin    Collection Time: 05/13/22  9:33 AM    Specimen: Blood   Result Value Ref Range    Troponin T <0.010 0.000 - 0.030 ng/mL   CBC Auto Differential    Collection Time: 05/13/22  9:33 AM    Specimen: Blood   Result Value Ref Range    WBC 5.72 3.40 - 10.80 10*3/mm3    RBC 4.09 3.77 - 5.28 10*6/mm3    Hemoglobin 10.6 (L) 12.0 - 15.9 g/dL    Hematocrit 32.5 (L) 34.0 - 46.6 %    MCV 79.5 79.0 - 97.0 fL    MCH 25.9 (L) 26.6 - 33.0 pg    MCHC 32.6 31.5 - 35.7 g/dL    RDW 14.5 12.3 - 15.4 %    RDW-SD 41.7 37.0 - 54.0 fl    MPV 9.4 6.0 - 12.0 fL    Platelets 309 140 - 450 10*3/mm3    Neutrophil % 66.7 42.7 - 76.0 %    Lymphocyte % 21.9 19.6 - 45.3 %    Monocyte % 8.0 5.0 - 12.0 %    Eosinophil % 2.6 0.3 - 6.2 %    Basophil % 0.5 0.0 - 1.5 %    Immature Grans % 0.3 0.0 - 0.5 %    Neutrophils, Absolute 3.81 1.70 - 7.00 10*3/mm3    Lymphocytes, Absolute 1.25 0.70 - 3.10 10*3/mm3    Monocytes, Absolute 0.46 0.10 - 0.90 10*3/mm3    Eosinophils, Absolute 0.15 0.00 - 0.40 10*3/mm3    Basophils, Absolute 0.03 0.00 - 0.20 10*3/mm3    Immature Grans, Absolute  0.02 0.00 - 0.05 10*3/mm3    nRBC 0.0 0.0 - 0.2 /100 WBC   Lipase    Collection Time: 05/13/22  9:33 AM    Specimen: Blood   Result Value Ref Range    Lipase 24 13 - 60 U/L       Ordered the above labs and independently reviewed the results.        RADIOLOGY  XR Chest 1 View    Result Date: 5/13/2022  Portable chest radiograph  HISTORY:Chest pain  TECHNIQUE: Single AP portable radiograph of the chest  COMPARISON:Multiple chest radiograph same back to 10/17/2021      FINDINGS AND IMPRESSION: Evaluation is suboptimal due to patient body habitus. No pulmonary consolidation, pleural effusion or pneumothorax is seen. Cardiac silhouette is at the upper limits of normal to mildly enlarged in size. Moderate to large hiatal hernia, as before.  This report was finalized on 5/13/2022 8:55 AM by Dr. Farshad Nance M.D.        I ordered the above noted radiological studies. Reviewed by me and discussed with radiologist.  See dictation for official radiology interpretation.      PROCEDURES    Procedures      MEDICATIONS GIVEN IN ER    Medications   sodium chloride 0.9 % flush 10 mL (has no administration in time range)   aluminum-magnesium hydroxide-simethicone (MAALOX MAX) 400-400-40 MG/5ML suspension 15 mL (15 mL Oral Given 5/13/22 0852)   Lidocaine Viscous HCl (XYLOCAINE) 2 % solution 15 mL (15 mL Mouth/Throat Given 5/13/22 0852)   meclizine (ANTIVERT) tablet 25 mg (25 mg Oral Given 5/13/22 0851)         PROGRESS, DATA ANALYSIS, CONSULTS, AND MEDICAL DECISION MAKING    All labs have been independently reviewed by me.  All radiology studies have been reviewed by me and discussed with radiologist dictating the report.   EKG's independently viewed and interpreted by me.  Discussion below represents my analysis of pertinent findings related to patient's condition, differential diagnosis, treatment plan and final disposition.    Differential diagnosis includes but is not limited to pancreatitis, GERD, esophagitis, atypical chest  pain, PE, acute aortic syndrome, non-STEMI, STEMI, unstable angina.    ED Course as of 05/13/22 1014   Fri May 13, 2022   0831 EKG          EKG time: 813  Rhythm/Rate: Normal sinus, rate 60  P waves and MS: Normal P, normal MS  QRS, axis: Narrow QRS, normal axis  ST and T waves: Normal ST and T waves    Interpreted Contemporaneously by me, independently viewed  Not significantly changed compared to prior 1/2/2022   [TR]   0957 WBC: 5.72 [TR]   1009 Lipase: 24 [TR]   1010 I reviewed the work-up and findings with the patient.  She immediately asked to leave.  She reports she feels entirely better.  Her pain was constant since early this morning.  I do not feel she needs a second troponin to rule out cardiac disease.  She has a hiatal hernia.  She states she has never talked to a surgeon about getting it fixed.  She has negative troponin, normal EKG, no abnormal abdominal laboratory values.  Plan discharge home.  I will refer her to thoracic surgery regarding her hiatal hernia repair. [TR]   1012 Heart Score Calculation:    History slightly suspicious: 0  History moderately suspicious: +1  History highly suspicious: +2    EKG normal: 0  EKG nonspecific repolarization disturbance: +1  EKG significant ST deviation: +2    Age less than 45: 0  Age 45-64: +1  Age greater than 65: +2    No known risk factors: 0  1-2 risk factors: +1  Greater than 3 risk factors: +2    Initial troponin less than the normal limit: 0  Initial troponin I-III times normal limit: +1  Initial troponin greater than 3 times normal limit: +2    Total score: 3 [TR]      ED Course User Index  [TR] Rudy Dominguez MD           PPE: The patient wore a mask and I wore an N95 mask throughout the entire patient encounter.       AS OF 10:14 EDT VITALS:    BP - 121/66  HR - 59  TEMP - 98.1 °F (36.7 °C) (Oral)  O2 SATS - 97%        DIAGNOSIS  Final diagnoses:   Acute abdominal pain   Atypical chest pain   Hiatal hernia         DISPOSITION  ED Disposition      ED Disposition   Discharge    Condition   Stable    Comment   --                   Rudy Dominguez MD  05/13/22 1766

## 2022-05-13 NOTE — DISCHARGE INSTRUCTIONS
Follow-up with Dr. Ortega regarding your hiatal hernia.  Return here immediately for any worsened pain, shortness of breath, nausea or vomiting.

## 2022-05-13 NOTE — ED TRIAGE NOTES
Pt arrived via ems from home with complaints of chest pain that started yesterday around 1800. Pt was given 324asa in route.     Pt was wearing a mask during assessment.  This RN wore appropriate PPE

## 2022-06-02 ENCOUNTER — APPOINTMENT (OUTPATIENT)
Dept: GENERAL RADIOLOGY | Facility: HOSPITAL | Age: 67
End: 2022-06-02

## 2022-06-02 ENCOUNTER — HOSPITAL ENCOUNTER (EMERGENCY)
Facility: HOSPITAL | Age: 67
Discharge: HOME | End: 2022-06-02
Attending: EMERGENCY MEDICINE | Admitting: EMERGENCY MEDICINE

## 2022-06-02 VITALS
DIASTOLIC BLOOD PRESSURE: 85 MMHG | SYSTOLIC BLOOD PRESSURE: 131 MMHG | HEART RATE: 80 BPM | RESPIRATION RATE: 19 BRPM | TEMPERATURE: 98.5 F | OXYGEN SATURATION: 94 %

## 2022-06-02 DIAGNOSIS — K22.2 ACUTE ESOPHAGEAL OBSTRUCTION: Primary | ICD-10-CM

## 2022-06-02 PROCEDURE — 71046 X-RAY EXAM CHEST 2 VIEWS: CPT

## 2022-06-02 PROCEDURE — 99283 EMERGENCY DEPT VISIT LOW MDM: CPT

## 2022-06-02 NOTE — ED PROVIDER NOTES
EMERGENCY DEPARTMENT ENCOUNTER    Room Number:  25/25  Date of encounter:  6/3/2022  PCP: Akosua Staley APRN  Historian: Patient      HPI:  Chief Complaint: Esophageal foreign body sensation/obstruction  A complete HPI/ROS/PMH/PSH/SH/FH are unobtainable due to: Nothing    Context: Krupa Mcmanus is a 66 y.o. female who presents to the ED c/o esophageal foreign body sensation/obstruction.  Per the patient she was eating a ham and cheese sandwich J PTA.  Patient became choked on the sandwich and the  had to perform the Heimlich maneuver.  Per the patient her chest is a little sore from the maneuver and she states she did not cough anything up.  She is concerned that there may still be a food bolus stuck in her esophagus.  She states she is able to handle her secretions at this time.  She is here for further evaluation.      PAST MEDICAL HISTORY  Active Ambulatory Problems     Diagnosis Date Noted   • Affective psychosis, bipolar (HCC) 07/11/2018   • Essential hypertension 07/12/2018   • Mild intermittent asthma without complication 07/12/2018   • Hypokalemia 07/12/2018   • Leukocytosis 07/12/2018   • Elevated liver enzymes 07/12/2018   • Type 2 diabetes mellitus (HCC) 07/12/2018     Resolved Ambulatory Problems     Diagnosis Date Noted   • No Resolved Ambulatory Problems     Past Medical History:   Diagnosis Date   • Alzheimer disease (HCC)    • Anxiety    • Bipolar 1 disorder (HCC)    • Dementia (HCC)    • Depression    • Diabetes mellitus (HCC)    • GERD (gastroesophageal reflux disease)    • OCD (obsessive compulsive disorder)    • Rheumatoid arthritis (HCC)    • UTI (urinary tract infection)          PAST SURGICAL HISTORY  Past Surgical History:   Procedure Laterality Date   • CHOLECYSTECTOMY     • HYSTERECTOMY           FAMILY HISTORY  Family History   Problem Relation Age of Onset   • Cancer Father         unknown         SOCIAL HISTORY  Social History     Socioeconomic History   • Marital status:     Tobacco Use   • Smoking status: Former Smoker   Substance and Sexual Activity   • Alcohol use: Never         ALLERGIES  Amoxicillin, Benadryl [diphenhydramine], Ceclor [cefaclor], Penicillins, and Zyprexa [olanzapine]        REVIEW OF SYSTEMS  Review of Systems   Constitutional: Negative for chills and fever.   HENT:        Foreign body sensation   Eyes: Negative.    Respiratory: Negative for cough and shortness of breath.    Cardiovascular: Negative for chest pain and palpitations.   Gastrointestinal:        Chest wall pain   Genitourinary: Negative.    Musculoskeletal: Negative.    Skin: Negative.    Neurological: Negative.    Psychiatric/Behavioral: Negative.      All systems reviewed and negative except for those discussed in HPI.       PHYSICAL EXAM    I have reviewed the triage vital signs and nursing notes.    ED Triage Vitals [06/02/22 1800]   Temp Heart Rate Resp BP SpO2   98.5 °F (36.9 °C) 74 16 122/72 95 %      Temp src Heart Rate Source Patient Position BP Location FiO2 (%)   Oral Monitor -- -- --       Physical Exam  GENERAL: Well-nourished, nontoxic  HENT: nares patent  EYES: no scleral icterus  CV: regular rhythm, regular rate, normal S1-S2  RESPIRATORY: normal effort, no wheezes rales or stridor  ABDOMEN: soft, nontender  MUSCULOSKELETAL: Chest wall slightly tender to palpation, no deformity  NEURO: alert and orient x4, moves all extremities, follows commands  SKIN: warm, dry        LAB RESULTS  No results found for this or any previous visit (from the past 24 hour(s)).    Ordered the above labs and independently reviewed the results.        RADIOLOGY  XR Chest 2 View    Result Date: 6/2/2022  XR CHEST 2 VW-  06/02/2022  HISTORY: Chest wall pain. Previous thymic maneuver.  Heart size is within normal limits. Lungs appear clear. No pneumothorax is seen. No fractures are seen. Moderate size hiatal hernia is seen.      1. Hiatal hernia. 2. No acute cardiopulmonary process.  This report was  finalized on 6/2/2022 8:23 PM by Dr. Chilo Woo M.D.        I ordered the above noted radiological studies. Reviewed by me and discussed with radiologist.  See dictation for official radiology interpretation.      PROCEDURES    Procedures      MEDICATIONS GIVEN IN ER    Medications - No data to display      PROGRESS, DATA ANALYSIS, CONSULTS, AND MEDICAL DECISION MAKING    All labs have been independently reviewed by me.  All radiology studies have been reviewed by me and discussed with radiologist dictating the report.   EKG's independently viewed and interpreted by me.  Discussion below represents my analysis of pertinent findings related to patient's condition, differential diagnosis, treatment plan and final disposition.    DDx includes but is not limited to: Food bolus with obstruction, aspiration injury, esophageal foreign body sensation secondary to trauma, chest wall injury.  Goal picture remains low for sternal fracture.  We will however go ahead and obtain a chest x-ray/two-view.  We will orally challenge the patient with water/clear liquid.  We will then reevaluate.        PPE: The patient wore a surgical mask throughout the entire patient encounter. I wore an N95.    AS OF 00:12 EDT VITALS:    BP - 131/85  HR - 80  TEMP - 98.5 °F (36.9 °C) (Oral)  O2 SATS - 94%        DIAGNOSIS  Final diagnoses:   Acute esophageal obstruction/resolved         DISPOSITION  DISCHARGE    Patient discharged in stable condition.    Reviewed implications of results, diagnosis, meds, responsibility to follow up, warning signs and symptoms of possible worsening, potential complications and reasons to return to ER.    Patient/Family voiced understanding of above instructions.    Discussed plan for discharge, as there is no emergent indication for admission. Patient referred to primary care provider for BP management due to today's BP. Pt/family is agreeable and understands need for follow up and repeat testing.  Pt is aware  that discharge does not mean that nothing is wrong but it indicates no emergency is present that requires admission and they must continue care with follow-up as given below or physician of their choice.     FOLLOW-UP  Al Ortiz MD  [address] [id number] [phone]    Schedule an appointment as soon as possible for a visit   For further evaluation and treatment         Medication List      No changes were made to your prescriptions during this visit.                Lonny Moreno III, PA  06/03/22 0012

## 2022-06-02 NOTE — ED TRIAGE NOTES
Pt choked on a piece of ham.  Her  did the heimlich and her airway cleared but nothing came out.  She wants to be checked     Patient was placed in face mask during first look triage.  Patient was wearing a face mask throughout encounter.  I wore personal protective equipment throughout the encounter.  Hand hygiene was performed before and after patient encounter.

## 2022-06-03 NOTE — ED PROVIDER NOTES
MD ATTESTATION NOTE    The JOSE and I have discussed this patient's history, physical exam, and treatment plan.    I provided a substantive portion of the care of this patient. I personally performed the physical exam, in its entirety. The attached note describes my personal findings.      Krupa Mcmanus is a 66 y.o. female who presents to the ED c/o esophageal foreign body sensation.  Stated she was eating a ham and cheese sandwich.  This was around 430 or 5 PM.  She started having a coughing fit and felt like food got stuck in her throat.  She states that she is now essentially asymptomatic.      On exam:  GENERAL: not distressed  HENT: nares patent  EYES: no scleral icterus  CV: regular rhythm, regular rate  RESPIRATORY: normal effort, clear to auscultation bilaterally  ABDOMEN: soft, nontender  MUSCULOSKELETAL: no deformity  NEURO: alert, moves all extremities, follows commands  SKIN: warm, dry    Labs  No results found for this or any previous visit (from the past 24 hour(s)).    Radiology  XR Chest 2 View    Result Date: 6/2/2022  XR CHEST 2 VW-  06/02/2022  HISTORY: Chest wall pain. Previous thymic maneuver.  Heart size is within normal limits. Lungs appear clear. No pneumothorax is seen. No fractures are seen. Moderate size hiatal hernia is seen.      1. Hiatal hernia. 2. No acute cardiopulmonary process.  This report was finalized on 6/2/2022 8:23 PM by Dr. Chilo Woo M.D.        Medical Decision Making:  ED Course as of 06/02/22 2230   Thu Jun 02, 2022 1938 Patient is able to swallow water without any difficulty.  Awaiting chest x-ray read. [RC]   2104 Chest x-ray shows no acute process.  Patient is well-appearing.  We will have the patient to practice a soft diet follow-up with GI.  We will have her return to the emergency department with any further symptoms consistent with food bolus/esophageal impaction. [RC]      ED Course User Index  [RC] Lonny Moreno III, PA           PPE: Both  the patient and I wore a surgical mask throughout the entire patient encounter. I wore protective goggles.     Diagnosis  Final diagnoses:   Acute esophageal obstruction/resolved        Alejandro Rhodes II, MD  06/02/22 1656

## 2022-06-03 NOTE — DISCHARGE INSTRUCTIONS
Call the GI doctor at the number above for further evaluation.  Recommend a soft diet until you are further evaluated by GI.  Avoid solid substances such as steak, chicken, sausage, hot dog, until cleared to do otherwise by GI.  Return to the emergency department should you have any further complications or feel you have something lodged in your esophagus.    Continue with your Protonix as previously directed.

## 2022-06-09 ENCOUNTER — APPOINTMENT (OUTPATIENT)
Dept: GENERAL RADIOLOGY | Facility: HOSPITAL | Age: 67
End: 2022-06-09

## 2022-06-09 ENCOUNTER — HOSPITAL ENCOUNTER (EMERGENCY)
Facility: HOSPITAL | Age: 67
Discharge: HOME | End: 2022-06-09
Attending: EMERGENCY MEDICINE | Admitting: EMERGENCY MEDICINE

## 2022-06-09 VITALS
RESPIRATION RATE: 20 BRPM | TEMPERATURE: 98.2 F | OXYGEN SATURATION: 96 % | SYSTOLIC BLOOD PRESSURE: 119 MMHG | DIASTOLIC BLOOD PRESSURE: 67 MMHG | HEART RATE: 65 BPM

## 2022-06-09 DIAGNOSIS — R11.0 NAUSEA: ICD-10-CM

## 2022-06-09 DIAGNOSIS — R07.89 ATYPICAL CHEST PAIN: ICD-10-CM

## 2022-06-09 DIAGNOSIS — R10.9 ABDOMINAL CRAMPS: ICD-10-CM

## 2022-06-09 DIAGNOSIS — R42 DIZZINESS: Primary | ICD-10-CM

## 2022-06-09 LAB
ALBUMIN SERPL-MCNC: 3.5 G/DL (ref 3.5–5.2)
ALBUMIN/GLOB SERPL: 1.2 G/DL
ALP SERPL-CCNC: 93 U/L (ref 39–117)
ALT SERPL W P-5'-P-CCNC: 29 U/L (ref 1–33)
ANION GAP SERPL CALCULATED.3IONS-SCNC: 10 MMOL/L (ref 5–15)
AST SERPL-CCNC: 27 U/L (ref 1–32)
BASOPHILS # BLD AUTO: 0.05 10*3/MM3 (ref 0–0.2)
BASOPHILS NFR BLD AUTO: 0.6 % (ref 0–1.5)
BILIRUB SERPL-MCNC: 0.2 MG/DL (ref 0–1.2)
BUN SERPL-MCNC: 13 MG/DL (ref 8–23)
BUN/CREAT SERPL: 17.8 (ref 7–25)
CALCIUM SPEC-SCNC: 8.7 MG/DL (ref 8.6–10.5)
CHLORIDE SERPL-SCNC: 105 MMOL/L (ref 98–107)
CO2 SERPL-SCNC: 27 MMOL/L (ref 22–29)
CREAT SERPL-MCNC: 0.73 MG/DL (ref 0.57–1)
DEPRECATED RDW RBC AUTO: 42.1 FL (ref 37–54)
EGFRCR SERPLBLD CKD-EPI 2021: 89.7 ML/MIN/1.73
EOSINOPHIL # BLD AUTO: 0.19 10*3/MM3 (ref 0–0.4)
EOSINOPHIL NFR BLD AUTO: 2.4 % (ref 0.3–6.2)
ERYTHROCYTE [DISTWIDTH] IN BLOOD BY AUTOMATED COUNT: 15 % (ref 12.3–15.4)
GLOBULIN UR ELPH-MCNC: 3 GM/DL
GLUCOSE SERPL-MCNC: 159 MG/DL (ref 65–99)
HCT VFR BLD AUTO: 31.9 % (ref 34–46.6)
HGB BLD-MCNC: 9.9 G/DL (ref 12–15.9)
IMM GRANULOCYTES # BLD AUTO: 0.02 10*3/MM3 (ref 0–0.05)
IMM GRANULOCYTES NFR BLD AUTO: 0.3 % (ref 0–0.5)
LYMPHOCYTES # BLD AUTO: 1.9 10*3/MM3 (ref 0.7–3.1)
LYMPHOCYTES NFR BLD AUTO: 23.9 % (ref 19.6–45.3)
MAGNESIUM SERPL-MCNC: 2.2 MG/DL (ref 1.6–2.4)
MCH RBC QN AUTO: 24.8 PG (ref 26.6–33)
MCHC RBC AUTO-ENTMCNC: 31 G/DL (ref 31.5–35.7)
MCV RBC AUTO: 79.8 FL (ref 79–97)
MONOCYTES # BLD AUTO: 0.59 10*3/MM3 (ref 0.1–0.9)
MONOCYTES NFR BLD AUTO: 7.4 % (ref 5–12)
NEUTROPHILS NFR BLD AUTO: 5.19 10*3/MM3 (ref 1.7–7)
NEUTROPHILS NFR BLD AUTO: 65.4 % (ref 42.7–76)
NRBC BLD AUTO-RTO: 0 /100 WBC (ref 0–0.2)
NT-PROBNP SERPL-MCNC: 74.9 PG/ML (ref 0–900)
PLATELET # BLD AUTO: 269 10*3/MM3 (ref 140–450)
PMV BLD AUTO: 9.7 FL (ref 6–12)
POTASSIUM SERPL-SCNC: 3.8 MMOL/L (ref 3.5–5.2)
PROT SERPL-MCNC: 6.5 G/DL (ref 6–8.5)
RBC # BLD AUTO: 4 10*6/MM3 (ref 3.77–5.28)
SODIUM SERPL-SCNC: 142 MMOL/L (ref 136–145)
TROPONIN T SERPL-MCNC: <0.01 NG/ML (ref 0–0.03)
WBC NRBC COR # BLD: 7.94 10*3/MM3 (ref 3.4–10.8)

## 2022-06-09 PROCEDURE — 83735 ASSAY OF MAGNESIUM: CPT | Performed by: EMERGENCY MEDICINE

## 2022-06-09 PROCEDURE — 93010 ELECTROCARDIOGRAM REPORT: CPT | Performed by: INTERNAL MEDICINE

## 2022-06-09 PROCEDURE — 84484 ASSAY OF TROPONIN QUANT: CPT | Performed by: EMERGENCY MEDICINE

## 2022-06-09 PROCEDURE — 99283 EMERGENCY DEPT VISIT LOW MDM: CPT

## 2022-06-09 PROCEDURE — 93005 ELECTROCARDIOGRAM TRACING: CPT | Performed by: EMERGENCY MEDICINE

## 2022-06-09 PROCEDURE — 71045 X-RAY EXAM CHEST 1 VIEW: CPT

## 2022-06-09 PROCEDURE — 83880 ASSAY OF NATRIURETIC PEPTIDE: CPT | Performed by: EMERGENCY MEDICINE

## 2022-06-09 PROCEDURE — 85025 COMPLETE CBC W/AUTO DIFF WBC: CPT | Performed by: EMERGENCY MEDICINE

## 2022-06-09 PROCEDURE — 80053 COMPREHEN METABOLIC PANEL: CPT | Performed by: EMERGENCY MEDICINE

## 2022-06-09 PROCEDURE — 99284 EMERGENCY DEPT VISIT MOD MDM: CPT

## 2022-06-09 RX ORDER — MECLIZINE HYDROCHLORIDE 25 MG/1
25 TABLET ORAL ONCE
Status: COMPLETED | OUTPATIENT
Start: 2022-06-09 | End: 2022-06-09

## 2022-06-09 RX ORDER — ALUMINA, MAGNESIA, AND SIMETHICONE 2400; 2400; 240 MG/30ML; MG/30ML; MG/30ML
15 SUSPENSION ORAL ONCE
Status: COMPLETED | OUTPATIENT
Start: 2022-06-09 | End: 2022-06-09

## 2022-06-09 RX ORDER — LIDOCAINE HYDROCHLORIDE 20 MG/ML
15 SOLUTION OROPHARYNGEAL ONCE
Status: COMPLETED | OUTPATIENT
Start: 2022-06-09 | End: 2022-06-09

## 2022-06-09 RX ORDER — SODIUM CHLORIDE 0.9 % (FLUSH) 0.9 %
10 SYRINGE (ML) INJECTION AS NEEDED
Status: DISCONTINUED | OUTPATIENT
Start: 2022-06-09 | End: 2022-06-09 | Stop reason: HOSPADM

## 2022-06-09 RX ADMIN — ALUMINUM HYDROXIDE, MAGNESIUM HYDROXIDE, AND DIMETHICONE 15 ML: 400; 400; 40 SUSPENSION ORAL at 19:33

## 2022-06-09 RX ADMIN — SODIUM CHLORIDE, POTASSIUM CHLORIDE, SODIUM LACTATE AND CALCIUM CHLORIDE 500 ML: 600; 310; 30; 20 INJECTION, SOLUTION INTRAVENOUS at 19:47

## 2022-06-09 RX ADMIN — LIDOCAINE HYDROCHLORIDE 15 ML: 20 SOLUTION ORAL; TOPICAL at 19:33

## 2022-06-09 RX ADMIN — MECLIZINE HYDROCHLORIDE 25 MG: 25 TABLET ORAL at 19:32

## 2022-06-09 NOTE — ED NOTES
Pt states they were eating a snack earlier and then started having pain in the mid upper to LUQ of their abdomen. Pt states they also have had some chest pain after eating. Pt is also complaining of nausea and possible constipation. Pt states they had a BM 3 days ago.

## 2022-06-09 NOTE — ED PROVIDER NOTES
EMERGENCY DEPARTMENT ENCOUNTER    Room Number:  30/30  Date of encounter:  6/9/2022  PCP: Akosua Staley APRN  Historian: Patient     I used full protective equipment while examining this patient.  This includes face mask, gloves and protective eyewear.  I washed my hands before entering the room and immediately upon leaving the room.  Patient was wearing a surgical mask.      HPI:  Chief Complaint: Dizziness  A complete HPI/ROS/PMH/PSH/SH/FH are unobtainable due to: None    Context: Krupa Mcmanus is a 66 y.o. female who presents to the ED c/o sudden onset of dizziness around 5 PM.  She felt like the room was spinning.  Symptoms are worse with movement.  Also reports ringing in both ears.  She then became nauseated and had some abdominal cramping but did not vomit.  She felt like her heart was racing.  Also reports mild shortness of breath and intermittent sharp chest pain.  She has a history of vertigo.  Denies earache, hearing loss, headache, fever, cough, vomiting, diarrhea, dysuria, vision loss, slurred speech, or unilateral numbness/tingling/weakness.  Patient had a normal bowel movement 2 to 3 days ago.  Patient was seen here in the ED on 5/13/2022 for similar complaints.  Work-up was unremarkable at that time.      PAST MEDICAL HISTORY  Active Ambulatory Problems     Diagnosis Date Noted   • Affective psychosis, bipolar (HCC) 07/11/2018   • Essential hypertension 07/12/2018   • Mild intermittent asthma without complication 07/12/2018   • Hypokalemia 07/12/2018   • Leukocytosis 07/12/2018   • Elevated liver enzymes 07/12/2018   • Type 2 diabetes mellitus (HCC) 07/12/2018     Resolved Ambulatory Problems     Diagnosis Date Noted   • No Resolved Ambulatory Problems     Past Medical History:   Diagnosis Date   • Alzheimer disease (HCC)    • Anxiety    • Bipolar 1 disorder (HCC)    • Dementia (HCC)    • Depression    • Diabetes mellitus (HCC)    • GERD (gastroesophageal reflux disease)    • OCD (obsessive  compulsive disorder)    • Rheumatoid arthritis (HCC)    • UTI (urinary tract infection)          PAST SURGICAL HISTORY  Past Surgical History:   Procedure Laterality Date   • CHOLECYSTECTOMY     • HYSTERECTOMY           FAMILY HISTORY  Family History   Problem Relation Age of Onset   • Cancer Father         unknown         SOCIAL HISTORY  Social History     Socioeconomic History   • Marital status:    Tobacco Use   • Smoking status: Former Smoker   Substance and Sexual Activity   • Alcohol use: Never         ALLERGIES  Amoxicillin, Benadryl [diphenhydramine], Ceclor [cefaclor], Penicillins, and Zyprexa [olanzapine]       REVIEW OF SYSTEMS  Review of Systems      All systems have been reviewed and are negative except as as discussed in the HPI    PHYSICAL EXAM    I have reviewed the triage vital signs and nursing notes.    ED Triage Vitals   Temp Heart Rate Resp BP SpO2   06/09/22 1843 06/09/22 1843 06/09/22 1843 06/09/22 1854 06/09/22 1843   98.2 °F (36.8 °C) 105 18 131/78 95 %      Temp src Heart Rate Source Patient Position BP Location FiO2 (%)   -- 06/09/22 1854 06/09/22 1854 06/09/22 1854 --    Monitor Sitting Right arm        Physical Exam  GENERAL: Awake, alert, oriented x3.  Well-developed, well-nourished elderly female.  Resting comfortably in no acute distress  HENT: NCAT, nares patent, moist mucous membranes, TMs are normal bilaterally  NECK: supple  EYES: Extraocular muscles intact, pupils reactive to light bilaterally, no nystagmus  CV: regular rhythm, regular rate  RESPIRATORY: normal effort, clear to auscultation bilaterally  ABDOMEN: soft, obese, nontender  MUSCULOSKELETAL: Extremities are nontender and without obvious deformity.  There is no calf tenderness.  There is trace pedal edema in both lower legs  NEURO: Speech is normal.  No aphasia.  No facial droop.  Normal strength and light touch sensation in all extremities.  Normal finger-to-nose and heel-to-shin testing bilaterally.  SKIN:  warm, dry, no rash  PSYCH: Normal mood and affect      LAB RESULTS  Recent Results (from the past 24 hour(s))   ECG 12 Lead    Collection Time: 06/09/22  6:55 PM   Result Value Ref Range    QT Interval 392 ms   Comprehensive Metabolic Panel    Collection Time: 06/09/22  7:39 PM    Specimen: Blood   Result Value Ref Range    Glucose 159 (H) 65 - 99 mg/dL    BUN 13 8 - 23 mg/dL    Creatinine 0.73 0.57 - 1.00 mg/dL    Sodium 142 136 - 145 mmol/L    Potassium 3.8 3.5 - 5.2 mmol/L    Chloride 105 98 - 107 mmol/L    CO2 27.0 22.0 - 29.0 mmol/L    Calcium 8.7 8.6 - 10.5 mg/dL    Total Protein 6.5 6.0 - 8.5 g/dL    Albumin 3.50 3.50 - 5.20 g/dL    ALT (SGPT) 29 1 - 33 U/L    AST (SGOT) 27 1 - 32 U/L    Alkaline Phosphatase 93 39 - 117 U/L    Total Bilirubin 0.2 0.0 - 1.2 mg/dL    Globulin 3.0 gm/dL    A/G Ratio 1.2 g/dL    BUN/Creatinine Ratio 17.8 7.0 - 25.0    Anion Gap 10.0 5.0 - 15.0 mmol/L    eGFR 89.7 >60.0 mL/min/1.73   BNP    Collection Time: 06/09/22  7:39 PM    Specimen: Blood   Result Value Ref Range    proBNP 74.9 0.0 - 900.0 pg/mL   Troponin    Collection Time: 06/09/22  7:39 PM    Specimen: Blood   Result Value Ref Range    Troponin T <0.010 0.000 - 0.030 ng/mL   Magnesium    Collection Time: 06/09/22  7:39 PM    Specimen: Blood   Result Value Ref Range    Magnesium 2.2 1.6 - 2.4 mg/dL   CBC Auto Differential    Collection Time: 06/09/22  7:39 PM    Specimen: Blood   Result Value Ref Range    WBC 7.94 3.40 - 10.80 10*3/mm3    RBC 4.00 3.77 - 5.28 10*6/mm3    Hemoglobin 9.9 (L) 12.0 - 15.9 g/dL    Hematocrit 31.9 (L) 34.0 - 46.6 %    MCV 79.8 79.0 - 97.0 fL    MCH 24.8 (L) 26.6 - 33.0 pg    MCHC 31.0 (L) 31.5 - 35.7 g/dL    RDW 15.0 12.3 - 15.4 %    RDW-SD 42.1 37.0 - 54.0 fl    MPV 9.7 6.0 - 12.0 fL    Platelets 269 140 - 450 10*3/mm3    Neutrophil % 65.4 42.7 - 76.0 %    Lymphocyte % 23.9 19.6 - 45.3 %    Monocyte % 7.4 5.0 - 12.0 %    Eosinophil % 2.4 0.3 - 6.2 %    Basophil % 0.6 0.0 - 1.5 %    Immature  Grans % 0.3 0.0 - 0.5 %    Neutrophils, Absolute 5.19 1.70 - 7.00 10*3/mm3    Lymphocytes, Absolute 1.90 0.70 - 3.10 10*3/mm3    Monocytes, Absolute 0.59 0.10 - 0.90 10*3/mm3    Eosinophils, Absolute 0.19 0.00 - 0.40 10*3/mm3    Basophils, Absolute 0.05 0.00 - 0.20 10*3/mm3    Immature Grans, Absolute 0.02 0.00 - 0.05 10*3/mm3    nRBC 0.0 0.0 - 0.2 /100 WBC       Ordered the above labs and independently reviewed the results.      RADIOLOGY  XR Chest 1 View    Result Date: 6/9/2022  PORTABLE CHEST X-RAY  HISTORY: Abdominal pain chest pain and shortness of breath.  Portable chest x-ray is provided. Correlation: Chest x-ray June 2, 2022.  FINDINGS: The cardiomediastinal silhouette is normal. The lungs are clear. The costophrenic sulci are dry and the bones appear normal. There is no pneumothorax.      Negative.  This report was finalized on 6/9/2022 7:12 PM by Dr. Chris Ontiveros M.D.        I ordered the above noted radiological studies. Reviewed by me and discussed with radiologist.  See dictation for official radiology interpretation.      PROCEDURES  Procedures      MEDICATIONS GIVEN IN ER    Medications   sodium chloride 0.9 % flush 10 mL (has no administration in time range)   lactated ringers bolus 500 mL (0 mL Intravenous Stopped 6/9/22 2120)   meclizine (ANTIVERT) tablet 25 mg (25 mg Oral Given 6/9/22 1932)   aluminum-magnesium hydroxide-simethicone (MAALOX MAX) 400-400-40 MG/5ML suspension 15 mL (15 mL Oral Given 6/9/22 1933)   Lidocaine Viscous HCl (XYLOCAINE) 2 % solution 15 mL (15 mL Mouth/Throat Given 6/9/22 1933)         PROGRESS, DATA ANALYSIS, CONSULTS, AND MEDICAL DECISION MAKING    All labs have been independently reviewed by me.  All radiology studies have been reviewed by me and discussed with radiologist dictating the report.   EKG's independently viewed and interpreted by me.  I have reviewed the nurse's notes, vital signs, past medical history, and medication list.  Discussion below represents  my analysis of pertinent findings related to patient's condition, differential diagnosis, treatment plan and final disposition.      ED Course as of 06/09/22 2201   Thu Jun 09, 2022   1852 Old records reviewed.  Patient has multiple ED visits here and elsewhere.  She has had at least 10 ED visits in the past 3 months.  She was last seen here 1 week ago for esophageal foreign body sensation.  She was seen here last month for acute abdominal pain.  CT abdomen/pelvis done on 5/10/2022 showed a large hiatal hernia with thickening of the distal esophagus.  There is moderate stool in the large bowel.  No bowel obstruction. [WH]   1905 EKG          EKG time: 1855  Rhythm/Rate: Sinus rhythm, rate 69  P waves and ME: Short ME interval  QRS, axis: Normal  ST and T waves: Normal    Interpreted Contemporaneously by me at 1856, independently viewed  EKG is not significantly changed compared to prior EKG done on 5/13/2022   [WH]   1951 Chest x-ray is negative acute [WH]   2058 Test results discussed with the patient.  States she is feeling much better.  She was able to ambulate to the bathroom without assistance and without dizziness.  Her work-up is unremarkable.  Chest pain is atypical.  EKG is unchanged.  Patient will be discharged. [WH]      ED Course User Index  [WH] Reggie Hernandez MD       AS OF 22:01 EDT VITALS:    BP - 119/67  HR - 65  TEMP - 98.2 °F (36.8 °C)  O2 SATS - 96%      DIAGNOSIS  Final diagnoses:   Dizziness   Atypical chest pain   Nausea   Abdominal cramps         DISPOSITION  DISCHARGE    Patient discharged in stable condition.    Reviewed implications of results, diagnosis, meds, responsibility to follow up, warning signs and symptoms of possible worsening, potential complications and reasons to return to ER, including worsening symptoms, trouble walking, vomiting, shortness of breath, persistent chest pain, headache, unilateral numbness/tingling/weakness, or other concern..    Patient/Family voiced  understanding of above instructions.    Discussed plan for discharge, as there is no emergent indication for admission. Patient referred to primary care provider for BP management due to today's BP. Pt/family is agreeable and understands need for follow up and repeat testing.  Pt is aware that discharge does not mean that nothing is wrong but it indicates no emergency is present that requires admission and they must continue care with follow-up as given below or physician of their choice.     FOLLOW-UP  Akosua Staley, APRN  [address] [id number] [phone]    Schedule an appointment as soon as possible for a visit            Medication List      No changes were made to your prescriptions during this visit.           Dictated utilizing Dragon dictation     Reggie Hernandez MD  06/09/22 [id number]

## 2022-06-10 ENCOUNTER — HOSPITAL ENCOUNTER (EMERGENCY)
Facility: HOSPITAL | Age: 67
Discharge: HOME | End: 2022-06-10
Attending: EMERGENCY MEDICINE | Admitting: EMERGENCY MEDICINE

## 2022-06-10 VITALS
TEMPERATURE: 98 F | HEART RATE: 67 BPM | WEIGHT: 250 LBS | RESPIRATION RATE: 18 BRPM | HEIGHT: 65 IN | DIASTOLIC BLOOD PRESSURE: 70 MMHG | SYSTOLIC BLOOD PRESSURE: 128 MMHG | BODY MASS INDEX: 41.65 KG/M2 | OXYGEN SATURATION: 98 %

## 2022-06-10 DIAGNOSIS — K59.00 CONSTIPATION, UNSPECIFIED CONSTIPATION TYPE: Primary | ICD-10-CM

## 2022-06-10 LAB — QT INTERVAL: 392 MS

## 2022-06-10 PROCEDURE — 99283 EMERGENCY DEPT VISIT LOW MDM: CPT

## 2022-06-10 NOTE — DISCHARGE INSTRUCTIONS
Follow-up with your primary care doctor if symptoms persist.  Return to emergency department for worsening symptoms, persistent chest pain, trouble breathing, fainting, vomiting, fever, or other concern.

## 2022-06-11 ENCOUNTER — HOSPITAL ENCOUNTER (EMERGENCY)
Facility: HOSPITAL | Age: 67
Discharge: HOME | End: 2022-06-11
Attending: EMERGENCY MEDICINE | Admitting: EMERGENCY MEDICINE

## 2022-06-11 VITALS
TEMPERATURE: 98.2 F | DIASTOLIC BLOOD PRESSURE: 59 MMHG | SYSTOLIC BLOOD PRESSURE: 97 MMHG | OXYGEN SATURATION: 96 % | HEART RATE: 66 BPM | RESPIRATION RATE: 19 BRPM

## 2022-06-11 DIAGNOSIS — Z86.59 HISTORY OF DEMENTIA: ICD-10-CM

## 2022-06-11 DIAGNOSIS — K59.00 CONSTIPATION, UNSPECIFIED CONSTIPATION TYPE: Primary | ICD-10-CM

## 2022-06-11 PROCEDURE — 99283 EMERGENCY DEPT VISIT LOW MDM: CPT

## 2022-06-11 NOTE — DISCHARGE INSTRUCTIONS
Continue MiraLAX as needed.  You may use this once or even twice daily if needed for constipation.  If you develop worsening abdominal pain, recurrent constipation, or vomiting, return to the ER for repeat evaluation.

## 2022-06-11 NOTE — ED PROVIDER NOTES
EMERGENCY DEPARTMENT ENCOUNTER    Room Number:  19/19  Date seen:  6/11/2022  PCP: Akosua Staley APRN  Historian: Patient      HPI:  Chief Complaint: Constipation  A complete HPI/ROS/PMH/PSH/SH/FH are unobtainable due to: Nothing  Context: Krupa Mcmanus is a 66 y.o. female who presents to the ED c/o constipation.  She reports that she has not had a bowel movement in 4 days.  She actually just had a bowel movement prior to me coming in to examine her.  She reports that it was a relatively good bowel movement.  She had taken some suppositories at home and had been using MiraLAX.  She denies vomiting.  She reports some mild crampy abdominal pain.  Her abdominal pain is improved after having had a bowel movement.  She denies fever or chills.  She was reportedly seen in the emergency department yesterday for dizziness and had not complained of constipation at that time.            PAST MEDICAL HISTORY  Active Ambulatory Problems     Diagnosis Date Noted   • Affective psychosis, bipolar (HCC) 07/11/2018   • Essential hypertension 07/12/2018   • Mild intermittent asthma without complication 07/12/2018   • Hypokalemia 07/12/2018   • Leukocytosis 07/12/2018   • Elevated liver enzymes 07/12/2018   • Type 2 diabetes mellitus (HCC) 07/12/2018     Resolved Ambulatory Problems     Diagnosis Date Noted   • No Resolved Ambulatory Problems     Past Medical History:   Diagnosis Date   • Alzheimer disease (HCC)    • Anxiety    • Bipolar 1 disorder (HCC)    • Dementia (HCC)    • Depression    • Diabetes mellitus (HCC)    • GERD (gastroesophageal reflux disease)    • OCD (obsessive compulsive disorder)    • Rheumatoid arthritis (HCC)    • UTI (urinary tract infection)          PAST SURGICAL HISTORY  Past Surgical History:   Procedure Laterality Date   • CHOLECYSTECTOMY     • HYSTERECTOMY           FAMILY HISTORY  Family History   Problem Relation Age of Onset   • Cancer Father         unknown         SOCIAL HISTORY  Social  History     Socioeconomic History   • Marital status:    Tobacco Use   • Smoking status: Former Smoker   Substance and Sexual Activity   • Alcohol use: Never         ALLERGIES  Amoxicillin, Benadryl [diphenhydramine], Ceclor [cefaclor], Penicillins, and Zyprexa [olanzapine]        REVIEW OF SYSTEMS  Review of Systems   Review of all 14 systems is negative other than stated in the HPI above.      PHYSICAL EXAM  ED Triage Vitals [06/10/22 2003]   Temp Heart Rate Resp BP SpO2   98 °F (36.7 °C) 65 22 131/73 98 %      Temp src Heart Rate Source Patient Position BP Location FiO2 (%)   Oral Monitor -- -- --         GENERAL: Awake and alert, no acute distress  HENT: nares patent  EYES: no scleral icterus, EOMI  CV: regular rhythm, normal rate  RESPIRATORY: normal effort  ABDOMEN: soft, nondistended, nontender throughout, bowel sounds normal  MUSCULOSKELETAL: no deformity  NEURO: alert, moves all extremities, follows commands  PSYCH:  calm, cooperative  SKIN: warm, dry    Vital signs and nursing notes reviewed.          LAB RESULTS  No results found for this or any previous visit (from the past 24 hour(s)).    Ordered the above labs and reviewed the results.        RADIOLOGY  No Radiology Exams Resulted Within Past 24 Hours    Ordered the above noted radiological studies. Reviewed by me in PACS.            PROCEDURES  Procedures              MEDICATIONS GIVEN IN ER  Medications - No data to display                MEDICAL DECISION MAKING, PROGRESS, and CONSULTS    All labs have been independently reviewed by me.  All radiology studies have been reviewed by me and discussed with radiologist dictating the report.   EKG's independently viewed and interpreted by me.  Discussion below represents my analysis of pertinent findings related to patient's condition, differential diagnosis, treatment plan and final disposition.      Differential diagnosis includes but is not limited to:  Constipation, fecal impaction, small bowel  obstruction, ileus    ED Course as of 06/11/22 0231   Fri Abhinav 10, 2022   2124 Patient had a bowel movement here in the emergency department prior to us initiating any treatment.  She did use a suppository at home.  She reports feeling better now.  She has a reassuring exam with nontender abdomen and normal bowel sounds.  She has had no vomiting.  He is appropriate discharge home with continued MiraLAX as needed and return precautions. [JR]      ED Course User Index  [JR] Canelo Heard MD              I wore an N95 mask, face shield, and gloves during this patient encounter.  Patient also wearing a surgical mask.  Hand hygeine performed before and after seeing the patient.    DIAGNOSIS  Final diagnoses:   Constipation, unspecified constipation type         DISPOSITION  DISCHARGE    Patient discharged in stable condition.    Reviewed implications of results, diagnosis, meds, responsibility to follow up, warning signs and symptoms of possible worsening, potential complications and reasons to return to ER.    Patient/Family voiced understanding of above instructions.    Discussed plan for discharge, as there is no emergent indication for admission. Patient referred to primary care provider for BP management due to today's BP. Pt/family is agreeable and understands need for follow up and repeat testing.  Pt is aware that discharge does not mean that nothing is wrong but it indicates no emergency is present that requires admission and they must continue care with follow-up as given below or physician of their choice.     FOLLOW-UP  Akosua Staley, DERRICK  [address] [id number] [phone]      As needed         Medication List      No changes were made to your prescriptions during this visit.                   Latest Documented Vital Signs:  As of 02:31 EDT  BP- 128/70 HR- 67 Temp- 98 °F (36.7 °C) (Oral) O2 sat- 98%        --    Please note that portions of this were completed with a voice  recognition program.            Canelo Heard MD  06/11/22 [id number]

## 2022-06-11 NOTE — DISCHARGE INSTRUCTIONS
Restart your MiraLAX as discussed, you can try magnesium citrate which can be obtained over-the-counter at the pharmacy if no improvement over the weekend.  Drink plenty of water, continue all other current medications, PCP follow-up on Monday as previously scheduled, ED return for worsening symptoms as needed.

## 2022-06-11 NOTE — ED PROVIDER NOTES
" EMERGENCY DEPARTMENT ENCOUNTER    Room Number:  03/03  Date of encounter:  6/11/2022  PCP: Akosua Staley APRN  Historian: Patient      HPI:  Chief Complaint: Stomach growling, constipation  A complete HPI/ROS/PMH/PSH/SH/FH are unobtainable due to: None    Context: Krupa Mcmanus is a 66 y.o. female who presents to the ED via [facility] from home for her stomach growling.  Patient has been constipated recently, was here earlier in the night for constipation, had a bowel movement here and was discharged.  States that she got home and her stomach started growling loudly.  No significant increased pain, nausea, vomiting.  No significant complaints currently.  Typically takes MiraLAX but has not taken it for couple days, states that she \"got careless and didn't take it\".       MEDICAL RECORD REVIEW    ER visit note reviewed from earlier today, patient had a bowel movement with improvement in the emergency department.  Was discharged.    PAST MEDICAL HISTORY  Active Ambulatory Problems     Diagnosis Date Noted   • Affective psychosis, bipolar (HCC) 07/11/2018   • Essential hypertension 07/12/2018   • Mild intermittent asthma without complication 07/12/2018   • Hypokalemia 07/12/2018   • Leukocytosis 07/12/2018   • Elevated liver enzymes 07/12/2018   • Type 2 diabetes mellitus (HCC) 07/12/2018     Resolved Ambulatory Problems     Diagnosis Date Noted   • No Resolved Ambulatory Problems     Past Medical History:   Diagnosis Date   • Alzheimer disease (HCC)    • Anxiety    • Bipolar 1 disorder (HCC)    • Dementia (HCC)    • Depression    • Diabetes mellitus (HCC)    • GERD (gastroesophageal reflux disease)    • OCD (obsessive compulsive disorder)    • Rheumatoid arthritis (HCC)    • UTI (urinary tract infection)          PAST SURGICAL HISTORY  Past Surgical History:   Procedure Laterality Date   • CHOLECYSTECTOMY     • HYSTERECTOMY           FAMILY HISTORY  Family History   Problem Relation Age of Onset   • " Cancer Father         unknown         SOCIAL HISTORY  Social History     Socioeconomic History   • Marital status:    Tobacco Use   • Smoking status: Former Smoker   Substance and Sexual Activity   • Alcohol use: Never         ALLERGIES  Amoxicillin, Benadryl [diphenhydramine], Ceclor [cefaclor], Penicillins, and Zyprexa [olanzapine]        REVIEW OF SYSTEMS  Review of Systems     All systems reviewed and negative except for those discussed in HPI.       PHYSICAL EXAM    I have reviewed the triage vital signs and nursing notes.    ED Triage Vitals [06/11/22 0224]   Temp Heart Rate Resp BP SpO2   98.2 °F (36.8 °C) 66 19 178/80 96 %      Temp src Heart Rate Source Patient Position BP Location FiO2 (%)   Oral -- -- -- --       Physical Exam  General: No acute distress, nontoxic  HEENT: Mucous membranes moist, atraumatic, EOMI  Neck: Full ROM  Pulm: Symmetric chest rise, nonlabored, lungs CTAB  Cardiovascular: Regular rate and rhythm, intact distal pulses  GI: Soft, nontender, nondistended, no rebound, no guarding, bowel sounds present  MSK: Full ROM, no deformity  Skin: Warm, dry  Neuro: Awake, alert, oriented x 4, GCS 15, moving all extremities, no focal deficits  Psych: Calm, cooperative      N95, protective eye goggles, and gloves used during this encounter. Patient in surgical mask.      LAB RESULTS  No results found for this or any previous visit (from the past 24 hour(s)).      RADIOLOGY  No Radiology Exams Resulted Within Past 24 Hours      PROCEDURES    Procedures      MEDICATIONS GIVEN IN ER    Medications - No data to display      PROGRESS, DATA ANALYSIS, CONSULTS, AND MEDICAL DECISION MAKING    All labs have been independently reviewed by me.  All radiology studies have been reviewed by me and discussed with radiologist dictating the report.   EKG's independently viewed and interpreted by me.  Discussion below represents my analysis of pertinent findings related to patient's condition, differential  diagnosis, treatment plan and final disposition.    No acute red flags on history or exam, patient does not need any acute emergent work-up at this time.  She is hemodynamically stable, reassuring and benign exam, safe for discharge with reassurance and recommendations to restart her MiraLAX, advised that she could add magnesium citrate if needed after couple of days.  Good hydration with plenty of water, PCP follow-up on Monday as previously scheduled.  All questions and concerns addressed.         AS OF 05:33 EDT VITALS:    BP - 97/59  HR - 66  TEMP - 98.2 °F (36.8 °C) (Oral)  02 SATS - 96%        DIAGNOSIS  Final diagnoses:   Constipation, unspecified constipation type   History of dementia         DISPOSITION  DISCHARGE    Patient discharged in stable condition.    Reviewed implications of results, diagnosis, meds, responsibility to follow up, warning signs and symptoms of possible worsening, potential complications and reasons to return to ER.    Patient/Family voiced understanding of above instructions.    Discussed plan for discharge, as there is no emergent indication for admission. Patient referred to primary care provider for BP management due to today's BP. Pt/family is agreeable and understands need for follow up and repeat testing.  Pt is aware that discharge does not mean that nothing is wrong but it indicates no emergency is present that requires admission and they must continue care with follow-up as given below or physician of their choice.     FOLLOW-UP  [facility]  [address]  [place] [address] [phone]    As needed, If symptoms worsen    Akosua Staley, APRN  [address] [facility] [address]  [place] [id number] [phone]    Schedule an appointment as soon as possible for a visit            Medication List      No changes were made to your prescriptions during this visit.                    Juan Miguel Montano MD  06/11/22 [id number]

## 2022-06-11 NOTE — ED NOTES
Chief Complaint   Patient presents with   • Constipation     Blood pressure 131/73, pulse 65, temperature 98 °F (36.7 °C), temperature source Oral, resp. rate 22, last menstrual period 07/11/2018, SpO2 98 %.    The patient presents to the ER with left sided abdominal pain. States she has not had a bowel movement for 4 days.   She states she attempted a suppository today with no results.  States she was here yesterday and they did not give her anything for the pain or constipation. I asked if she mentioned it to the doctor and she states she can't remember if she did or not.

## 2022-06-11 NOTE — ED NOTES
"Pt states she is constipated. Pt was here last night for n/v and \"heart racing.\" EMS found heart rate to be 65. Pt states she felt constipated last night but did not receive treatment for it. Pt states her last bowel movement was 4 days ago. Pt denies n/v tonight. Pt states she is not compliant with her Miralax  "

## 2022-06-11 NOTE — ED NOTES
"Pt arrives via EMS for \"stomach grumbling.\" Pt was discharged from here a few hours ago for constipation. Pt had a bowel movement since then but now is concerned because she can hear her stomach \"growling from across the room.\"   "

## 2022-06-14 ENCOUNTER — HOSPITAL ENCOUNTER (EMERGENCY)
Facility: HOSPITAL | Age: 67
Discharge: HOME | End: 2022-06-15
Attending: EMERGENCY MEDICINE | Admitting: EMERGENCY MEDICINE

## 2022-06-14 ENCOUNTER — APPOINTMENT (OUTPATIENT)
Dept: CT IMAGING | Facility: HOSPITAL | Age: 67
End: 2022-06-14

## 2022-06-14 DIAGNOSIS — K59.00 CONSTIPATION, UNSPECIFIED CONSTIPATION TYPE: ICD-10-CM

## 2022-06-14 DIAGNOSIS — R10.30 LOWER ABDOMINAL PAIN: Primary | ICD-10-CM

## 2022-06-14 LAB
BACTERIA UR QL AUTO: NORMAL /HPF
BASOPHILS # BLD AUTO: 0.04 10*3/MM3 (ref 0–0.2)
BASOPHILS NFR BLD AUTO: 0.5 % (ref 0–1.5)
BILIRUB UR QL STRIP: NEGATIVE
CLARITY UR: CLEAR
COLOR UR: YELLOW
DEPRECATED RDW RBC AUTO: 45.5 FL (ref 37–54)
EOSINOPHIL # BLD AUTO: 0.21 10*3/MM3 (ref 0–0.4)
EOSINOPHIL NFR BLD AUTO: 2.5 % (ref 0.3–6.2)
ERYTHROCYTE [DISTWIDTH] IN BLOOD BY AUTOMATED COUNT: 15.6 % (ref 12.3–15.4)
GLUCOSE UR STRIP-MCNC: NEGATIVE MG/DL
HCG SERPL QL: NEGATIVE
HCT VFR BLD AUTO: 32.3 % (ref 34–46.6)
HGB BLD-MCNC: 10 G/DL (ref 12–15.9)
HGB UR QL STRIP.AUTO: NEGATIVE
HYALINE CASTS UR QL AUTO: NORMAL /LPF
IMM GRANULOCYTES # BLD AUTO: 0.01 10*3/MM3 (ref 0–0.05)
IMM GRANULOCYTES NFR BLD AUTO: 0.1 % (ref 0–0.5)
KETONES UR QL STRIP: ABNORMAL
LEUKOCYTE ESTERASE UR QL STRIP.AUTO: ABNORMAL
LYMPHOCYTES # BLD AUTO: 2.52 10*3/MM3 (ref 0.7–3.1)
LYMPHOCYTES NFR BLD AUTO: 30.1 % (ref 19.6–45.3)
MAGNESIUM SERPL-MCNC: 2 MG/DL (ref 1.6–2.4)
MCH RBC QN AUTO: 25.3 PG (ref 26.6–33)
MCHC RBC AUTO-ENTMCNC: 31 G/DL (ref 31.5–35.7)
MCV RBC AUTO: 81.6 FL (ref 79–97)
MONOCYTES # BLD AUTO: 0.66 10*3/MM3 (ref 0.1–0.9)
MONOCYTES NFR BLD AUTO: 7.9 % (ref 5–12)
NEUTROPHILS NFR BLD AUTO: 4.94 10*3/MM3 (ref 1.7–7)
NEUTROPHILS NFR BLD AUTO: 58.9 % (ref 42.7–76)
NITRITE UR QL STRIP: NEGATIVE
NRBC BLD AUTO-RTO: 0 /100 WBC (ref 0–0.2)
PH UR STRIP.AUTO: 5.5 [PH] (ref 5–8)
PLATELET # BLD AUTO: 291 10*3/MM3 (ref 140–450)
PMV BLD AUTO: 9.9 FL (ref 6–12)
PROT UR QL STRIP: NEGATIVE
RBC # BLD AUTO: 3.96 10*6/MM3 (ref 3.77–5.28)
RBC # UR STRIP: NORMAL /HPF
REF LAB TEST METHOD: NORMAL
SP GR UR STRIP: 1.02 (ref 1–1.03)
SQUAMOUS #/AREA URNS HPF: NORMAL /HPF
TROPONIN T SERPL-MCNC: <0.01 NG/ML (ref 0–0.03)
UROBILINOGEN UR QL STRIP: ABNORMAL
WBC # UR STRIP: NORMAL /HPF
WBC NRBC COR # BLD: 8.38 10*3/MM3 (ref 3.4–10.8)

## 2022-06-14 PROCEDURE — 83735 ASSAY OF MAGNESIUM: CPT | Performed by: EMERGENCY MEDICINE

## 2022-06-14 PROCEDURE — P9612 CATHETERIZE FOR URINE SPEC: HCPCS

## 2022-06-14 PROCEDURE — 81001 URINALYSIS AUTO W/SCOPE: CPT | Performed by: EMERGENCY MEDICINE

## 2022-06-14 PROCEDURE — 84484 ASSAY OF TROPONIN QUANT: CPT | Performed by: EMERGENCY MEDICINE

## 2022-06-14 PROCEDURE — 93005 ELECTROCARDIOGRAM TRACING: CPT | Performed by: EMERGENCY MEDICINE

## 2022-06-14 PROCEDURE — 85025 COMPLETE CBC W/AUTO DIFF WBC: CPT | Performed by: EMERGENCY MEDICINE

## 2022-06-14 PROCEDURE — 93010 ELECTROCARDIOGRAM REPORT: CPT | Performed by: INTERNAL MEDICINE

## 2022-06-14 PROCEDURE — 74176 CT ABD & PELVIS W/O CONTRAST: CPT

## 2022-06-14 PROCEDURE — 80053 COMPREHEN METABOLIC PANEL: CPT | Performed by: EMERGENCY MEDICINE

## 2022-06-14 PROCEDURE — 84703 CHORIONIC GONADOTROPIN ASSAY: CPT | Performed by: EMERGENCY MEDICINE

## 2022-06-14 PROCEDURE — 99284 EMERGENCY DEPT VISIT MOD MDM: CPT

## 2022-06-14 RX ORDER — SODIUM CHLORIDE 0.9 % (FLUSH) 0.9 %
10 SYRINGE (ML) INJECTION AS NEEDED
Status: DISCONTINUED | OUTPATIENT
Start: 2022-06-14 | End: 2022-06-15 | Stop reason: HOSPADM

## 2022-06-15 VITALS
HEART RATE: 62 BPM | RESPIRATION RATE: 18 BRPM | DIASTOLIC BLOOD PRESSURE: 57 MMHG | HEIGHT: 65 IN | TEMPERATURE: 97.6 F | OXYGEN SATURATION: 97 % | SYSTOLIC BLOOD PRESSURE: 107 MMHG | WEIGHT: 245 LBS | BODY MASS INDEX: 40.82 KG/M2

## 2022-06-15 LAB
ALBUMIN SERPL-MCNC: 3.8 G/DL (ref 3.5–5.2)
ALBUMIN/GLOB SERPL: 1.6 G/DL
ALP SERPL-CCNC: 101 U/L (ref 39–117)
ALT SERPL W P-5'-P-CCNC: 35 U/L (ref 1–33)
ANION GAP SERPL CALCULATED.3IONS-SCNC: 14.5 MMOL/L (ref 5–15)
AST SERPL-CCNC: 29 U/L (ref 1–32)
BILIRUB SERPL-MCNC: 0.3 MG/DL (ref 0–1.2)
BUN SERPL-MCNC: 14 MG/DL (ref 8–23)
BUN/CREAT SERPL: 20.3 (ref 7–25)
CALCIUM SPEC-SCNC: 8.8 MG/DL (ref 8.6–10.5)
CHLORIDE SERPL-SCNC: 99 MMOL/L (ref 98–107)
CO2 SERPL-SCNC: 27.5 MMOL/L (ref 22–29)
CREAT SERPL-MCNC: 0.69 MG/DL (ref 0.57–1)
EGFRCR SERPLBLD CKD-EPI 2021: 94.7 ML/MIN/1.73
GLOBULIN UR ELPH-MCNC: 2.4 GM/DL
GLUCOSE SERPL-MCNC: 123 MG/DL (ref 65–99)
POTASSIUM SERPL-SCNC: 3.8 MMOL/L (ref 3.5–5.2)
PROT SERPL-MCNC: 6.2 G/DL (ref 6–8.5)
QT INTERVAL: 428 MS
SODIUM SERPL-SCNC: 141 MMOL/L (ref 136–145)

## 2022-06-15 NOTE — ED PROVIDER NOTES
EMERGENCY DEPARTMENT ENCOUNTER    Room Number:  22/22  Date of encounter:  6/15/2022  PCP: Akosua Staley APRN  Historian: Patient      HPI:  Chief Complaint: Abdominal pain  A complete HPI/ROS/PMH/PSH/SH/FH are unobtainable due to: Not applicable  Context: Krupa Mcmanus is a 66 y.o. female who presents to the ED c/o patient has a history of chronic episodic abdominal pain.  She also has a history of constipation.  She states that today after eating bologna sandwich and lua beans had some increased abdominal pain mainly in the lower abdomen.  Still has the pain now but is definitely doing better.  She reports no vomiting.  Her last bowel movement was yesterday.  She did also have a bowel movement they before that which was normal.  She denies any chest pain, shortness of breath, fevers or chills.  Denies any dysuria or urinary frequency.  Denies any visual change, speech change or any focal weakness to arms or legs.        Previous Episodes: Long history of episodic abdominal pain with constipation  Current Symptoms: Lower abdominal pain but is definitely improved    MEDICAL HISTORY REVIEWED  Patient has had multiple visits to the emergency department this past year.  She has had 7 visits to the emergency department in this past month.  She was seen 610 and 611 were her last visit to the emergency department.      PAST MEDICAL HISTORY  Active Ambulatory Problems     Diagnosis Date Noted   • Affective psychosis, bipolar (HCC) 07/11/2018   • Essential hypertension 07/12/2018   • Mild intermittent asthma without complication 07/12/2018   • Hypokalemia 07/12/2018   • Leukocytosis 07/12/2018   • Elevated liver enzymes 07/12/2018   • Type 2 diabetes mellitus (HCC) 07/12/2018     Resolved Ambulatory Problems     Diagnosis Date Noted   • No Resolved Ambulatory Problems     Past Medical History:   Diagnosis Date   • Alzheimer disease (HCC)    • Anxiety    • Bipolar 1 disorder (HCC)    • Dementia (HCC)    •  Depression    • Diabetes mellitus (HCC)    • GERD (gastroesophageal reflux disease)    • OCD (obsessive compulsive disorder)    • Rheumatoid arthritis (HCC)    • UTI (urinary tract infection)          PAST SURGICAL HISTORY  Past Surgical History:   Procedure Laterality Date   • CHOLECYSTECTOMY     • HYSTERECTOMY           FAMILY HISTORY  Family History   Problem Relation Age of Onset   • Cancer Father         unknown         SOCIAL HISTORY  Social History     Socioeconomic History   • Marital status:    Tobacco Use   • Smoking status: Former Smoker   Substance and Sexual Activity   • Alcohol use: Never         ALLERGIES  Amoxicillin, Benadryl [diphenhydramine], Ceclor [cefaclor], Penicillins, and Zyprexa [olanzapine]        REVIEW OF SYSTEMS  Review of Systems     All systems reviewed and negative except for those discussed in HPI.       PHYSICAL EXAM    I have reviewed the triage vital signs and nursing notes.    ED Triage Vitals [06/14/22 2201]   Temp Heart Rate Resp BP SpO2   97.6 °F (36.4 °C) 65 18 132/76 97 %      Temp src Heart Rate Source Patient Position BP Location FiO2 (%)   Oral Monitor -- -- --       GENERAL: Anxious female that is elderly, no acute distress.Vital signs on my initial evaluation unremarkable  HENT: nares patent  Head/neck/ face are symmetric without gross deformity, signs of trauma, or swelling  EYES: no scleral icterus, no conjunctival pallor.  NECK: Supple, no meningismus  CV: regular rhythm, regular rate with intact distal pulses.  RESPIRATORY: normal effort and no respiratory distress.  ABDOMEN: soft and  Super morbidly obese.  Normal bowel sounds.  Has mild tenderness in the lower abdomen that is subjective on exam.  Her belly is soft and no signs of peritonitis.  No guarding.  MUSCULOSKELETAL: no deformity.  Intact distal pulses.  1+ edema to feet and ankles that appear symmetric.  This edema is chronic.  No coolness, cyanosis, pallor  NEURO: alert and appropriate, moves all  extremities, follows commands.  No focal motor or sensory changes  SKIN: warm, dry    Vital signs and nursing notes reviewed.        LAB RESULTS  Recent Results (from the past 24 hour(s))   ECG 12 Lead    Collection Time: 06/14/22 10:52 PM   Result Value Ref Range    QT Interval 428 ms   Comprehensive Metabolic Panel    Collection Time: 06/14/22 10:53 PM    Specimen: Blood   Result Value Ref Range    Glucose 123 (H) 65 - 99 mg/dL    BUN 14 8 - 23 mg/dL    Creatinine 0.69 0.57 - 1.00 mg/dL    Sodium 141 136 - 145 mmol/L    Potassium 3.8 3.5 - 5.2 mmol/L    Chloride 99 98 - 107 mmol/L    CO2 27.5 22.0 - 29.0 mmol/L    Calcium 8.8 8.6 - 10.5 mg/dL    Total Protein 6.2 6.0 - 8.5 g/dL    Albumin 3.80 3.50 - 5.20 g/dL    ALT (SGPT) 35 (H) 1 - 33 U/L    AST (SGOT) 29 1 - 32 U/L    Alkaline Phosphatase 101 39 - 117 U/L    Total Bilirubin 0.3 0.0 - 1.2 mg/dL    Globulin 2.4 gm/dL    A/G Ratio 1.6 g/dL    BUN/Creatinine Ratio 20.3 7.0 - 25.0    Anion Gap 14.5 5.0 - 15.0 mmol/L    eGFR 94.7 >60.0 mL/min/1.73   hCG, Serum, Qualitative    Collection Time: 06/14/22 10:53 PM    Specimen: Blood   Result Value Ref Range    HCG Qualitative Negative Negative   Troponin    Collection Time: 06/14/22 10:53 PM    Specimen: Blood   Result Value Ref Range    Troponin T <0.010 0.000 - 0.030 ng/mL   Magnesium    Collection Time: 06/14/22 10:53 PM    Specimen: Blood   Result Value Ref Range    Magnesium 2.0 1.6 - 2.4 mg/dL   CBC Auto Differential    Collection Time: 06/14/22 10:53 PM    Specimen: Blood   Result Value Ref Range    WBC 8.38 3.40 - 10.80 10*3/mm3    RBC 3.96 3.77 - 5.28 10*6/mm3    Hemoglobin 10.0 (L) 12.0 - 15.9 g/dL    Hematocrit 32.3 (L) 34.0 - 46.6 %    MCV 81.6 79.0 - 97.0 fL    MCH 25.3 (L) 26.6 - 33.0 pg    MCHC 31.0 (L) 31.5 - 35.7 g/dL    RDW 15.6 (H) 12.3 - 15.4 %    RDW-SD 45.5 37.0 - 54.0 fl    MPV 9.9 6.0 - 12.0 fL    Platelets 291 140 - 450 10*3/mm3    Neutrophil % 58.9 42.7 - 76.0 %    Lymphocyte % 30.1 19.6 - 45.3  %    Monocyte % 7.9 5.0 - 12.0 %    Eosinophil % 2.5 0.3 - 6.2 %    Basophil % 0.5 0.0 - 1.5 %    Immature Grans % 0.1 0.0 - 0.5 %    Neutrophils, Absolute 4.94 1.70 - 7.00 10*3/mm3    Lymphocytes, Absolute 2.52 0.70 - 3.10 10*3/mm3    Monocytes, Absolute 0.66 0.10 - 0.90 10*3/mm3    Eosinophils, Absolute 0.21 0.00 - 0.40 10*3/mm3    Basophils, Absolute 0.04 0.00 - 0.20 10*3/mm3    Immature Grans, Absolute 0.01 0.00 - 0.05 10*3/mm3    nRBC 0.0 0.0 - 0.2 /100 WBC   Urinalysis With Microscopic If Indicated (No Culture) - Urine, Catheter In/Out    Collection Time: 06/14/22 11:03 PM    Specimen: Urine, Catheter In/Out   Result Value Ref Range    Color, UA Yellow Yellow, Straw    Appearance, UA Clear Clear    pH, UA 5.5 5.0 - 8.0    Specific Gravity, UA 1.025 1.005 - 1.030    Glucose, UA Negative Negative    Ketones, UA Trace (A) Negative    Bilirubin, UA Negative Negative    Blood, UA Negative Negative    Protein, UA Negative Negative    Leuk Esterase, UA Trace (A) Negative    Nitrite, UA Negative Negative    Urobilinogen, UA 1.0 E.U./dL 0.2 - 1.0 E.U./dL   Urinalysis, Microscopic Only - Urine, Catheter In/Out    Collection Time: 06/14/22 11:03 PM    Specimen: Urine, Catheter In/Out   Result Value Ref Range    RBC, UA 0-2 None Seen, 0-2 /HPF    WBC, UA 0-2 None Seen, 0-2 /HPF    Bacteria, UA None Seen None Seen /HPF    Squamous Epithelial Cells, UA 0-2 None Seen, 0-2 /HPF    Hyaline Casts, UA 0-2 None Seen /LPF    Methodology Automated Microscopy        Ordered the above labs and independently reviewed the results.        RADIOLOGY  CT Abdomen Pelvis Without Contrast    Result Date: 6/15/2022  CT OF THE ABDOMEN AND PELVIS WITHOUT CONTRAST  HISTORY: Lower abdominal pain  COMPARISON: 07/12/2018  TECHNIQUE: Axial CT imaging was obtained through the abdomen and pelvis. No IV contrast was administered.  FINDINGS: Images through the lung bases are clear. There is a moderate hiatal hernia. Duodenum appears normal, as are the  adrenal glands and spleen, and pancreas. Gallbladder is surgically absent. No suspicious hepatic lesions are seen. No hydronephrosis is seen on either side. There are simple appearing bilateral renal cysts. No distal ureteral or bladder stones are seen. Bladder is contracted. Uterus is surgically absent. There is no bowel obstruction. Images through the lower abdomen are degraded by motion artifact. Appendix is not clearly identified. I don't see definite evidence of appendicitis. There is increased stool burden throughout the colon, which could reflect some constipation. There is atherosclerotic involvement of the aorta and coronary arteries. No acute osseous abnormalities are seen.      Increased stool burden within the colon may reflect constipation.  Radiation dose reduction techniques were utilized, including automated exposure control and exposure modulation based on body size.  This report was finalized on 6/15/2022 12:37 AM by Dr. Clau Mcmanus M.D.        I ordered the above noted radiological studies. Reviewed by me and discussed with radiologist.  See dictation for official radiology interpretation.      PROCEDURES    Procedures      MEDICATIONS GIVEN IN ER    Medications   sodium chloride 0.9 % flush 10 mL (has no administration in time range)         PROGRESS, DATA ANALYSIS, CONSULTS, AND MEDICAL DECISION MAKING    Differential diagnosis includes   - hepatobiliary pathology such as cholecystitis, cholangitis, and symptomatic cholelithiasis  -PUD  -Mesenteric ischemia  - Pancreatitis  - Dyspepsia  - Small bowel or large bowel obstruction  - Appendicitis  - Diverticulitis  - UTI including pyelonephritis  - Ureteral stone  - Zoster  - Colitis, including infectious and ischemic  - Atypical ACS  We will check lab work as well as a CT scan of her abdomen pelvis.  Currently her pain is improved.  History of frequent complaints of abdominal pain and frequent evaluations in the emergency department.  All  questions answered at this time.    We are currently under a pandemic from the COVID19 infection.  The patient presented to the emergency department by ambulance or personal vehicle. I followed the current protocols required by Infection Control at [facility] in my evaluation and treatment of the patient. The patient was wearing a face mask during my evaluation and throughout my encounter. During my whole encounter with this patient I used appropriate personal protective equipment.  This equipment consisted of eye protection, facemask, gown, and gloves.  I applied this equipment before entering the room.      All labs have been independently reviewed by me.  All radiology studies have been reviewed by me and discussed with radiologist dictating the report.   EKG's independently viewed and interpreted by me.  Discussion below represents my analysis of pertinent findings related to patient's condition, differential diagnosis, treatment plan and final disposition.      ED Course as of 06/15/22 0244   Tue Jun 14, 2022   2318 EKG reading  EKG was done at 2252  Rate of 58 it is normal sinus rhythm narrow complex with normal axis no acute injury pattern appreciated patient has some Q waves in V1 and V2 which are nonspecific  QT looks normal  Compared to a previous EKG from 6/9/2022 and I do not appreciate any significant change. [MM]   Wed Abhinav 15, 2022   0126 I reviewed the CT scan report.  Patient has signs of increased stool burden suggestive of constipation.  Otherwise no acute process appreciated.  Please see complete dictated report from the radiologist. [MM]   0140 Patient's symptoms have resolved and has no pain.  She is in no distress and has no complaints.  Informed her the results of the lab work.  Informed her the necessity to follow-up with her primary care provider.  All questions answered.  Patient agrees with that plan. [MM]   0141 Hemoglobin(!): 10.0  Chronic anemia.  No significant change.  [MM]      ED Course User Index  [MM] Keshawn Correa MD       AS OF 02:44 EDT VITALS:    BP - 107/57  HR - 62  TEMP - 97.6 °F (36.4 °C) (Oral)  02 SATS - 97%        DIAGNOSIS  Final diagnoses:   Lower abdominal pain: Acute on chronic   Constipation, unspecified constipation type         DISPOSITION  DISCHARGE    Patient discharged in stable condition.    Reviewed implications of results, diagnosis, meds, responsibility to follow up, warning signs and symptoms of possible worsening, potential complications and reasons to return to ER, including worsening of symptoms, fever, chest pain, shortness of breath, any concerns..    Patient/Family voiced understanding of above instructions.    Discussed plan for discharge, as there is no emergent indication for admission. Pt/family is agreeable and understands need for follow up and repeat testing.  Pt is aware that discharge does not mean that nothing is wrong but it indicates no emergency is present that requires admission and they must continue care with follow-up as given below or physician of their choice.     FOLLOW-UP  Akosua Staley, APRN  [facility] [address]  [phone]    In 2 days  Return if worsening of pain, fever, shortness of breath, chest pain, any concerns.         Medication List      No changes were made to your prescriptions during this visit.                 DICTATED UTILIZING DRAGON DICTATION     Keshawn Correa MD  06/15/22 3709

## 2022-06-15 NOTE — ED TRIAGE NOTES
Pt was brought in by ems from home for lower abd pain that started shortly after eating a bologna sandwich. Reports that she is trying to get into GI md for abd pain.    Pt wearing mask on arrival. Staff wearing mask and goggles at time of triage.

## 2022-06-16 ENCOUNTER — HOSPITAL ENCOUNTER (EMERGENCY)
Facility: HOSPITAL | Age: 67
Discharge: HOME | End: 2022-06-17
Attending: EMERGENCY MEDICINE | Admitting: EMERGENCY MEDICINE

## 2022-06-16 ENCOUNTER — APPOINTMENT (OUTPATIENT)
Dept: CT IMAGING | Facility: HOSPITAL | Age: 67
End: 2022-06-16

## 2022-06-16 DIAGNOSIS — G89.29 CHRONIC ABDOMINAL PAIN: Primary | ICD-10-CM

## 2022-06-16 DIAGNOSIS — R10.9 CHRONIC ABDOMINAL PAIN: Primary | ICD-10-CM

## 2022-06-16 DIAGNOSIS — R42 DIZZINESS: ICD-10-CM

## 2022-06-16 LAB
BASOPHILS # BLD AUTO: 0.05 10*3/MM3 (ref 0–0.2)
BASOPHILS NFR BLD AUTO: 0.5 % (ref 0–1.5)
BILIRUB UR QL STRIP: NEGATIVE
CLARITY UR: CLEAR
COLOR UR: ABNORMAL
DEPRECATED RDW RBC AUTO: 43.5 FL (ref 37–54)
EOSINOPHIL # BLD AUTO: 0.16 10*3/MM3 (ref 0–0.4)
EOSINOPHIL NFR BLD AUTO: 1.7 % (ref 0.3–6.2)
ERYTHROCYTE [DISTWIDTH] IN BLOOD BY AUTOMATED COUNT: 15.6 % (ref 12.3–15.4)
GLUCOSE UR STRIP-MCNC: NEGATIVE MG/DL
HCT VFR BLD AUTO: 33.4 % (ref 34–46.6)
HGB BLD-MCNC: 10.9 G/DL (ref 12–15.9)
HGB UR QL STRIP.AUTO: NEGATIVE
IMM GRANULOCYTES # BLD AUTO: 0.03 10*3/MM3 (ref 0–0.05)
IMM GRANULOCYTES NFR BLD AUTO: 0.3 % (ref 0–0.5)
KETONES UR QL STRIP: ABNORMAL
LEUKOCYTE ESTERASE UR QL STRIP.AUTO: NEGATIVE
LYMPHOCYTES # BLD AUTO: 2.6 10*3/MM3 (ref 0.7–3.1)
LYMPHOCYTES NFR BLD AUTO: 27.5 % (ref 19.6–45.3)
MCH RBC QN AUTO: 25.9 PG (ref 26.6–33)
MCHC RBC AUTO-ENTMCNC: 32.6 G/DL (ref 31.5–35.7)
MCV RBC AUTO: 79.3 FL (ref 79–97)
MONOCYTES # BLD AUTO: 0.72 10*3/MM3 (ref 0.1–0.9)
MONOCYTES NFR BLD AUTO: 7.6 % (ref 5–12)
NEUTROPHILS NFR BLD AUTO: 5.89 10*3/MM3 (ref 1.7–7)
NEUTROPHILS NFR BLD AUTO: 62.4 % (ref 42.7–76)
NITRITE UR QL STRIP: NEGATIVE
NRBC BLD AUTO-RTO: 0 /100 WBC (ref 0–0.2)
PH UR STRIP.AUTO: 5.5 [PH] (ref 5–8)
PLATELET # BLD AUTO: 305 10*3/MM3 (ref 140–450)
PMV BLD AUTO: 9.8 FL (ref 6–12)
PROT UR QL STRIP: NEGATIVE
QT INTERVAL: 369 MS
RBC # BLD AUTO: 4.21 10*6/MM3 (ref 3.77–5.28)
SP GR UR STRIP: 1.03 (ref 1–1.03)
UROBILINOGEN UR QL STRIP: ABNORMAL
WBC NRBC COR # BLD: 9.45 10*3/MM3 (ref 3.4–10.8)

## 2022-06-16 PROCEDURE — 99284 EMERGENCY DEPT VISIT MOD MDM: CPT

## 2022-06-16 PROCEDURE — 85025 COMPLETE CBC W/AUTO DIFF WBC: CPT | Performed by: EMERGENCY MEDICINE

## 2022-06-16 PROCEDURE — 83690 ASSAY OF LIPASE: CPT | Performed by: EMERGENCY MEDICINE

## 2022-06-16 PROCEDURE — 84484 ASSAY OF TROPONIN QUANT: CPT | Performed by: EMERGENCY MEDICINE

## 2022-06-16 PROCEDURE — 93005 ELECTROCARDIOGRAM TRACING: CPT | Performed by: EMERGENCY MEDICINE

## 2022-06-16 PROCEDURE — 81003 URINALYSIS AUTO W/O SCOPE: CPT | Performed by: EMERGENCY MEDICINE

## 2022-06-16 PROCEDURE — 70450 CT HEAD/BRAIN W/O DYE: CPT

## 2022-06-16 PROCEDURE — 93010 ELECTROCARDIOGRAM REPORT: CPT | Performed by: INTERNAL MEDICINE

## 2022-06-16 PROCEDURE — 80053 COMPREHEN METABOLIC PANEL: CPT | Performed by: EMERGENCY MEDICINE

## 2022-06-16 PROCEDURE — 93005 ELECTROCARDIOGRAM TRACING: CPT

## 2022-06-16 RX ORDER — SODIUM CHLORIDE 0.9 % (FLUSH) 0.9 %
10 SYRINGE (ML) INJECTION AS NEEDED
Status: DISCONTINUED | OUTPATIENT
Start: 2022-06-16 | End: 2022-06-17 | Stop reason: HOSPADM

## 2022-06-17 VITALS
HEART RATE: 78 BPM | WEIGHT: 240 LBS | TEMPERATURE: 99.1 F | RESPIRATION RATE: 18 BRPM | BODY MASS INDEX: 39.99 KG/M2 | HEIGHT: 65 IN | DIASTOLIC BLOOD PRESSURE: 54 MMHG | OXYGEN SATURATION: 94 % | SYSTOLIC BLOOD PRESSURE: 127 MMHG

## 2022-06-17 LAB
ALBUMIN SERPL-MCNC: 4.1 G/DL (ref 3.5–5.2)
ALBUMIN/GLOB SERPL: 1.7 G/DL
ALP SERPL-CCNC: 108 U/L (ref 39–117)
ALT SERPL W P-5'-P-CCNC: 31 U/L (ref 1–33)
ANION GAP SERPL CALCULATED.3IONS-SCNC: 14.4 MMOL/L (ref 5–15)
AST SERPL-CCNC: 28 U/L (ref 1–32)
BILIRUB SERPL-MCNC: 0.3 MG/DL (ref 0–1.2)
BUN SERPL-MCNC: 20 MG/DL (ref 8–23)
BUN/CREAT SERPL: 24.1 (ref 7–25)
CALCIUM SPEC-SCNC: 9 MG/DL (ref 8.6–10.5)
CHLORIDE SERPL-SCNC: 101 MMOL/L (ref 98–107)
CO2 SERPL-SCNC: 27.6 MMOL/L (ref 22–29)
CREAT SERPL-MCNC: 0.83 MG/DL (ref 0.57–1)
EGFRCR SERPLBLD CKD-EPI 2021: 76.9 ML/MIN/1.73
GLOBULIN UR ELPH-MCNC: 2.4 GM/DL
GLUCOSE SERPL-MCNC: 156 MG/DL (ref 65–99)
LIPASE SERPL-CCNC: 36 U/L (ref 13–60)
POTASSIUM SERPL-SCNC: 4 MMOL/L (ref 3.5–5.2)
PROT SERPL-MCNC: 6.5 G/DL (ref 6–8.5)
SODIUM SERPL-SCNC: 143 MMOL/L (ref 136–145)
TROPONIN T SERPL-MCNC: <0.01 NG/ML (ref 0–0.03)

## 2022-06-17 NOTE — ED TRIAGE NOTES
Pt to ED from home with c/o abd pain and dizziness.  Pt reports abd pain x a few days with nausea.  Pt reports dizziness started tonight after dinner.  Pt  A&ox4.    Pt wearing mask, staff wearing appropriate PPE.

## 2022-06-17 NOTE — ED PROVIDER NOTES
EMERGENCY DEPARTMENT ENCOUNTER    Room Number:  10/10  PCP: Akosua Staley APRN  Historian: Patient  History Limited By: Nothing      HPI  Chief Complaint: Abdominal pain and dizziness  Context: Krupa Mcmanus is a 66 y.o. female who presents to the ED c/o abdominal pain and dizziness.  Patient states abdominal pain has been present for several days.  Patient has been seen multiple times for this abdominal pain and here in the emergency department this week.  Patient's been diagnosed with constipation has had a negative CT scan.  States she has been taking her MiraLAX and has been having bowel movements.  Patient also states she is dizzy has been seen for the dizziness as well.  Patient has no focal weakness or numbness.  No chest pain or shortness of breath.  Patient has no speech issues.  States dizziness started after she ate.      Location: Generalized abdominal  Radiation: None  Character: Cramping  Duration: Several days  Severity: Moderate  Progression: Not improving  Aggravating Factors: Nothing  Alleviating Factors: Nothing        MEDICAL RECORD REVIEW    Patient has been seen here multiple times this week for similar symptoms          PAST MEDICAL HISTORY  Active Ambulatory Problems     Diagnosis Date Noted   • Affective psychosis, bipolar (HCC) 07/11/2018   • Essential hypertension 07/12/2018   • Mild intermittent asthma without complication 07/12/2018   • Hypokalemia 07/12/2018   • Leukocytosis 07/12/2018   • Elevated liver enzymes 07/12/2018   • Type 2 diabetes mellitus (HCC) 07/12/2018     Resolved Ambulatory Problems     Diagnosis Date Noted   • No Resolved Ambulatory Problems     Past Medical History:   Diagnosis Date   • Alzheimer disease (HCC)    • Anxiety    • Bipolar 1 disorder (HCC)    • Dementia (HCC)    • Depression    • Diabetes mellitus (HCC)    • GERD (gastroesophageal reflux disease)    • OCD (obsessive compulsive disorder)    • Rheumatoid arthritis (HCC)    • UTI (urinary tract  infection)          PAST SURGICAL HISTORY  Past Surgical History:   Procedure Laterality Date   • CHOLECYSTECTOMY     • HYSTERECTOMY           FAMILY HISTORY  Family History   Problem Relation Age of Onset   • Cancer Father         unknown         SOCIAL HISTORY  Social History     Socioeconomic History   • Marital status:    Tobacco Use   • Smoking status: Former Smoker   Substance and Sexual Activity   • Alcohol use: Never         ALLERGIES  Amoxicillin, Benadryl [diphenhydramine], Ceclor [cefaclor], Penicillins, and Zyprexa [olanzapine]        REVIEW OF SYSTEMS  Review of Systems   Constitutional: Negative for fever.   HENT: Negative for sore throat.    Eyes: Negative.    Respiratory: Negative for cough and shortness of breath.    Cardiovascular: Negative for chest pain.   Gastrointestinal: Positive for abdominal pain. Negative for diarrhea and vomiting.   Genitourinary: Negative for dysuria.   Musculoskeletal: Negative for neck pain.   Skin: Negative for rash.   Allergic/Immunologic: Negative.    Neurological: Positive for dizziness and light-headedness. Negative for weakness, numbness and headaches.   Hematological: Negative.    Psychiatric/Behavioral: Negative.    All other systems reviewed and are negative.           PHYSICAL EXAM  ED Triage Vitals [06/16/22 2128]   Temp Heart Rate Resp BP SpO2   99.1 °F (37.3 °C) 97 16 103/72 96 %      Temp src Heart Rate Source Patient Position BP Location FiO2 (%)   Tympanic -- -- -- --       Physical Exam  Vitals and nursing note reviewed.   Constitutional:       General: She is not in acute distress.  HENT:      Head: Normocephalic and atraumatic.   Eyes:      Pupils: Pupils are equal, round, and reactive to light.   Cardiovascular:      Rate and Rhythm: Normal rate and regular rhythm.      Heart sounds: Normal heart sounds.   Pulmonary:      Effort: Pulmonary effort is normal. No respiratory distress.      Breath sounds: Normal breath sounds.   Abdominal:       Palpations: Abdomen is soft.      Tenderness: There is abdominal tenderness. There is no guarding or rebound.   Musculoskeletal:         General: Normal range of motion.      Cervical back: Normal range of motion and neck supple.   Skin:     General: Skin is warm and dry.      Findings: No rash.   Neurological:      Mental Status: She is alert and oriented to person, place, and time.      Sensory: Sensation is intact.   Psychiatric:         Mood and Affect: Mood and affect normal.       Patient was wearing a face mask when I entered the room and they continued to wear a mask throughout their stay in the ED.  I wore PPE, including  gloves, face mask with shield or face mask with goggles whenever I was in the room with patient.       LAB RESULTS  Recent Results (from the past 24 hour(s))   ECG 12 Lead    Collection Time: 06/16/22  9:35 PM   Result Value Ref Range    QT Interval 369 ms   Comprehensive Metabolic Panel    Collection Time: 06/16/22 11:26 PM    Specimen: Blood   Result Value Ref Range    Glucose 156 (H) 65 - 99 mg/dL    BUN 20 8 - 23 mg/dL    Creatinine 0.83 0.57 - 1.00 mg/dL    Sodium 143 136 - 145 mmol/L    Potassium 4.0 3.5 - 5.2 mmol/L    Chloride 101 98 - 107 mmol/L    CO2 27.6 22.0 - 29.0 mmol/L    Calcium 9.0 8.6 - 10.5 mg/dL    Total Protein 6.5 6.0 - 8.5 g/dL    Albumin 4.10 3.50 - 5.20 g/dL    ALT (SGPT) 31 1 - 33 U/L    AST (SGOT) 28 1 - 32 U/L    Alkaline Phosphatase 108 39 - 117 U/L    Total Bilirubin 0.3 0.0 - 1.2 mg/dL    Globulin 2.4 gm/dL    A/G Ratio 1.7 g/dL    BUN/Creatinine Ratio 24.1 7.0 - 25.0    Anion Gap 14.4 5.0 - 15.0 mmol/L    eGFR 76.9 >60.0 mL/min/1.73   Lipase    Collection Time: 06/16/22 11:26 PM    Specimen: Blood   Result Value Ref Range    Lipase 36 13 - 60 U/L   Troponin    Collection Time: 06/16/22 11:26 PM    Specimen: Blood   Result Value Ref Range    Troponin T <0.010 0.000 - 0.030 ng/mL   CBC Auto Differential    Collection Time: 06/16/22 11:26 PM    Specimen: Blood    Result Value Ref Range    WBC 9.45 3.40 - 10.80 10*3/mm3    RBC 4.21 3.77 - 5.28 10*6/mm3    Hemoglobin 10.9 (L) 12.0 - 15.9 g/dL    Hematocrit 33.4 (L) 34.0 - 46.6 %    MCV 79.3 79.0 - 97.0 fL    MCH 25.9 (L) 26.6 - 33.0 pg    MCHC 32.6 31.5 - 35.7 g/dL    RDW 15.6 (H) 12.3 - 15.4 %    RDW-SD 43.5 37.0 - 54.0 fl    MPV 9.8 6.0 - 12.0 fL    Platelets 305 140 - 450 10*3/mm3    Neutrophil % 62.4 42.7 - 76.0 %    Lymphocyte % 27.5 19.6 - 45.3 %    Monocyte % 7.6 5.0 - 12.0 %    Eosinophil % 1.7 0.3 - 6.2 %    Basophil % 0.5 0.0 - 1.5 %    Immature Grans % 0.3 0.0 - 0.5 %    Neutrophils, Absolute 5.89 1.70 - 7.00 10*3/mm3    Lymphocytes, Absolute 2.60 0.70 - 3.10 10*3/mm3    Monocytes, Absolute 0.72 0.10 - 0.90 10*3/mm3    Eosinophils, Absolute 0.16 0.00 - 0.40 10*3/mm3    Basophils, Absolute 0.05 0.00 - 0.20 10*3/mm3    Immature Grans, Absolute 0.03 0.00 - 0.05 10*3/mm3    nRBC 0.0 0.0 - 0.2 /100 WBC   Urinalysis With Microscopic If Indicated (No Culture) - Urine, Clean Catch    Collection Time: 06/16/22 11:38 PM    Specimen: Urine, Clean Catch   Result Value Ref Range    Color, UA Dark Yellow (A) Yellow, Straw    Appearance, UA Clear Clear    pH, UA 5.5 5.0 - 8.0    Specific Gravity, UA 1.029 1.005 - 1.030    Glucose, UA Negative Negative    Ketones, UA Trace (A) Negative    Bilirubin, UA Negative Negative    Blood, UA Negative Negative    Protein, UA Negative Negative    Leuk Esterase, UA Negative Negative    Nitrite, UA Negative Negative    Urobilinogen, UA 1.0 E.U./dL 0.2 - 1.0 E.U./dL       Ordered the above labs and reviewed the results.        RADIOLOGY  CT Head Without Contrast   Final Result       1. No acute intracranial findings.   2. Left mastoid effusion.       Radiation dose reduction techniques were utilized, including automated   exposure control and exposure modulation based on body size.       This report was finalized on 6/17/2022 12:29 AM by Dr. Clau Mcmanus M.D.               Ordered the  above noted radiological studies. Reviewed by me in PACS.        PROCEDURES  Procedures      EKG:          EKG time: 2135  Rhythm/Rate: Normal sinus rhythm 79  P waves and WA: First-degree AV block  QRS, axis: Normal QRS  ST and T waves: Normal ST-T waves    Interpreted Contemporaneously by me, independently viewed  Unchanged compared to prior 6/14/2022        MEDICATIONS GIVEN IN ER  Medications   sodium chloride 0.9 % flush 10 mL (has no administration in time range)             PROGRESS AND CONSULTS  ED Course as of 06/17/22 0145   Fri Jun 17, 2022   0110 01:11 EDT  Patient presents for both abdominal pain and dizziness.  Patient's abdominal pain has been worked up multiple times including recent CT scan.  Patient's white blood cell count is normal no evidence of pancreatitis or cholecystitis.  Patient has no belly pain now.  Patient also has some dizziness that she is been seen for in the past.  Patient's EKG normal patient's head CT negative.  She will be discharged home. [SL]      ED Course User Index  [SL] Dariel Donnelly MD           MEDICAL DECISION MAKING      MDM  Number of Diagnoses or Management Options     Amount and/or Complexity of Data Reviewed  Clinical lab tests: reviewed and ordered (White blood cell count normal)  Tests in the radiology section of CPT®: reviewed and ordered (CT head negative acute)  Tests in the medicine section of CPT®: reviewed               DIAGNOSIS  Final diagnoses:   Chronic abdominal pain   Dizziness           DISPOSITION  DISCHARGE    Patient discharged in stable condition.    Reviewed implications of results, diagnosis, meds, responsibility to follow up, warning signs and symptoms of possible worsening, potential complications and reasons to return to ER, including worsening symptoms    Patient/Family voiced understanding of above instructions.    Discussed plan for discharge, as there is no emergent indication for admission. Patient referred to primary care provider  for BP management due to today's BP. Pt/family is agreeable and understands need for follow up and repeat testing.  Pt is aware that discharge does not mean that nothing is wrong but it indicates no emergency is present that requires admission and they must continue care with follow-up as given below or physician of their choice.     FOLLOW-UP  Akosua Staley, DERRICK  [address]  [facility] [address]  [place] [id number]  [phone]    Schedule an appointment as soon as possible for a visit            Medication List      No changes were made to your prescriptions during this visit.             Latest Documented Vital Signs:  As of 01:45 EDT  BP- 127/54 HR- 78 Temp- 99.1 °F (37.3 °C) (Tympanic) O2 sat- 94%                         Dariel Donnelly MD  06/17/22 0146

## 2022-06-20 ENCOUNTER — HOSPITAL ENCOUNTER (EMERGENCY)
Facility: HOSPITAL | Age: 67
Discharge: LEFT BEFORE DISCHARGE | End: 2022-06-20

## 2022-06-20 VITALS
OXYGEN SATURATION: 96 % | HEART RATE: 112 BPM | SYSTOLIC BLOOD PRESSURE: 113 MMHG | DIASTOLIC BLOOD PRESSURE: 78 MMHG | TEMPERATURE: 99.4 F | RESPIRATION RATE: 16 BRPM

## 2022-06-20 LAB
BASOPHILS # BLD AUTO: 0.05 10*3/MM3 (ref 0–0.2)
BASOPHILS NFR BLD AUTO: 0.4 % (ref 0–1.5)
DEPRECATED RDW RBC AUTO: 44 FL (ref 37–54)
EOSINOPHIL # BLD AUTO: 0.07 10*3/MM3 (ref 0–0.4)
EOSINOPHIL NFR BLD AUTO: 0.6 % (ref 0.3–6.2)
ERYTHROCYTE [DISTWIDTH] IN BLOOD BY AUTOMATED COUNT: 15.3 % (ref 12.3–15.4)
HCT VFR BLD AUTO: 35.7 % (ref 34–46.6)
HGB BLD-MCNC: 11.1 G/DL (ref 12–15.9)
HOLD SPECIMEN: NORMAL
IMM GRANULOCYTES # BLD AUTO: 0.04 10*3/MM3 (ref 0–0.05)
IMM GRANULOCYTES NFR BLD AUTO: 0.4 % (ref 0–0.5)
LYMPHOCYTES # BLD AUTO: 1.86 10*3/MM3 (ref 0.7–3.1)
LYMPHOCYTES NFR BLD AUTO: 16.7 % (ref 19.6–45.3)
MCH RBC QN AUTO: 25.1 PG (ref 26.6–33)
MCHC RBC AUTO-ENTMCNC: 31.1 G/DL (ref 31.5–35.7)
MCV RBC AUTO: 80.6 FL (ref 79–97)
MONOCYTES # BLD AUTO: 0.7 10*3/MM3 (ref 0.1–0.9)
MONOCYTES NFR BLD AUTO: 6.3 % (ref 5–12)
NEUTROPHILS NFR BLD AUTO: 75.6 % (ref 42.7–76)
NEUTROPHILS NFR BLD AUTO: 8.43 10*3/MM3 (ref 1.7–7)
NRBC BLD AUTO-RTO: 0 /100 WBC (ref 0–0.2)
PLATELET # BLD AUTO: 296 10*3/MM3 (ref 140–450)
PMV BLD AUTO: 10 FL (ref 6–12)
RBC # BLD AUTO: 4.43 10*6/MM3 (ref 3.77–5.28)
WBC NRBC COR # BLD: 11.15 10*3/MM3 (ref 3.4–10.8)
WHOLE BLOOD HOLD SPECIMEN: NORMAL

## 2022-06-20 PROCEDURE — 85025 COMPLETE CBC W/AUTO DIFF WBC: CPT

## 2022-06-20 PROCEDURE — 99211 OFF/OP EST MAY X REQ PHY/QHP: CPT

## 2022-06-20 RX ORDER — SODIUM CHLORIDE 0.9 % (FLUSH) 0.9 %
10 SYRINGE (ML) INJECTION AS NEEDED
Status: DISCONTINUED | OUTPATIENT
Start: 2022-06-20 | End: 2022-06-20 | Stop reason: HOSPADM

## 2022-06-20 NOTE — ED TRIAGE NOTES
Pt complains of lower abdominal pain with nausea that started this morning after eating. Pt complains of burning with urination as well.     Patient masked at arrival and triage staff wore all appropriate PPE during entire encounter with patient.

## 2022-08-18 ENCOUNTER — APPOINTMENT (OUTPATIENT)
Dept: GENERAL RADIOLOGY | Facility: HOSPITAL | Age: 67
End: 2022-08-18

## 2022-08-18 ENCOUNTER — HOSPITAL ENCOUNTER (INPATIENT)
Facility: HOSPITAL | Age: 67
LOS: 2 days | Discharge: HOME | End: 2022-08-20
Attending: EMERGENCY MEDICINE | Admitting: INTERNAL MEDICINE

## 2022-08-18 DIAGNOSIS — N39.0 ACUTE UTI: Primary | ICD-10-CM

## 2022-08-18 DIAGNOSIS — G93.40 ENCEPHALOPATHY: ICD-10-CM

## 2022-08-18 DIAGNOSIS — F03.91 DEMENTIA WITH BEHAVIORAL DISTURBANCE, UNSPECIFIED DEMENTIA TYPE: ICD-10-CM

## 2022-08-18 LAB
ALBUMIN SERPL-MCNC: 4 G/DL (ref 3.5–5.2)
ALBUMIN/GLOB SERPL: 1.5 G/DL
ALP SERPL-CCNC: 112 U/L (ref 39–117)
ALT SERPL W P-5'-P-CCNC: 41 U/L (ref 1–33)
AMPHET+METHAMPHET UR QL: NEGATIVE
ANION GAP SERPL CALCULATED.3IONS-SCNC: 17.5 MMOL/L (ref 5–15)
AST SERPL-CCNC: 38 U/L (ref 1–32)
BACTERIA UR QL AUTO: ABNORMAL /HPF
BARBITURATES UR QL SCN: NEGATIVE
BASOPHILS # BLD AUTO: 0.05 10*3/MM3 (ref 0–0.2)
BASOPHILS NFR BLD AUTO: 0.5 % (ref 0–1.5)
BENZODIAZ UR QL SCN: NEGATIVE
BILIRUB SERPL-MCNC: 0.5 MG/DL (ref 0–1.2)
BILIRUB UR QL STRIP: NEGATIVE
BUN SERPL-MCNC: 13 MG/DL (ref 8–23)
BUN/CREAT SERPL: 15.5 (ref 7–25)
CALCIUM SPEC-SCNC: 9.3 MG/DL (ref 8.6–10.5)
CANNABINOIDS SERPL QL: NEGATIVE
CHLORIDE SERPL-SCNC: 101 MMOL/L (ref 98–107)
CLARITY UR: ABNORMAL
CO2 SERPL-SCNC: 19.5 MMOL/L (ref 22–29)
COCAINE UR QL: NEGATIVE
COLOR UR: YELLOW
CREAT SERPL-MCNC: 0.84 MG/DL (ref 0.57–1)
D-LACTATE SERPL-SCNC: 3 MMOL/L (ref 0.5–2)
D-LACTATE SERPL-SCNC: 4.3 MMOL/L (ref 0.5–2)
DEPRECATED RDW RBC AUTO: 46.6 FL (ref 37–54)
EGFRCR SERPLBLD CKD-EPI 2021: 75.8 ML/MIN/1.73
EOSINOPHIL # BLD AUTO: 0.09 10*3/MM3 (ref 0–0.4)
EOSINOPHIL NFR BLD AUTO: 1 % (ref 0.3–6.2)
ERYTHROCYTE [DISTWIDTH] IN BLOOD BY AUTOMATED COUNT: 16.1 % (ref 12.3–15.4)
ETHANOL BLD-MCNC: <10 MG/DL (ref 0–10)
ETHANOL UR QL: <0.01 %
GLOBULIN UR ELPH-MCNC: 2.7 GM/DL
GLUCOSE BLDC GLUCOMTR-MCNC: 114 MG/DL (ref 70–130)
GLUCOSE SERPL-MCNC: 177 MG/DL (ref 65–99)
GLUCOSE UR STRIP-MCNC: NEGATIVE MG/DL
HCT VFR BLD AUTO: 36.4 % (ref 34–46.6)
HGB BLD-MCNC: 11.5 G/DL (ref 12–15.9)
HGB UR QL STRIP.AUTO: NEGATIVE
HOLD SPECIMEN: NORMAL
HOLD SPECIMEN: NORMAL
HYALINE CASTS UR QL AUTO: ABNORMAL /LPF
IMM GRANULOCYTES # BLD AUTO: 0.03 10*3/MM3 (ref 0–0.05)
IMM GRANULOCYTES NFR BLD AUTO: 0.3 % (ref 0–0.5)
KETONES UR QL STRIP: NEGATIVE
LEUKOCYTE ESTERASE UR QL STRIP.AUTO: ABNORMAL
LYMPHOCYTES # BLD AUTO: 1.6 10*3/MM3 (ref 0.7–3.1)
LYMPHOCYTES NFR BLD AUTO: 17 % (ref 19.6–45.3)
MAGNESIUM SERPL-MCNC: 1.8 MG/DL (ref 1.6–2.4)
MCH RBC QN AUTO: 25.6 PG (ref 26.6–33)
MCHC RBC AUTO-ENTMCNC: 31.6 G/DL (ref 31.5–35.7)
MCV RBC AUTO: 81.1 FL (ref 79–97)
METHADONE UR QL SCN: NEGATIVE
MONOCYTES # BLD AUTO: 0.52 10*3/MM3 (ref 0.1–0.9)
MONOCYTES NFR BLD AUTO: 5.5 % (ref 5–12)
NEUTROPHILS NFR BLD AUTO: 7.11 10*3/MM3 (ref 1.7–7)
NEUTROPHILS NFR BLD AUTO: 75.7 % (ref 42.7–76)
NITRITE UR QL STRIP: POSITIVE
NRBC BLD AUTO-RTO: 0 /100 WBC (ref 0–0.2)
OPIATES UR QL: NEGATIVE
OXYCODONE UR QL SCN: NEGATIVE
PH UR STRIP.AUTO: 6.5 [PH] (ref 5–8)
PLATELET # BLD AUTO: 284 10*3/MM3 (ref 140–450)
PMV BLD AUTO: 10.3 FL (ref 6–12)
POTASSIUM SERPL-SCNC: 3.9 MMOL/L (ref 3.5–5.2)
PROT SERPL-MCNC: 6.7 G/DL (ref 6–8.5)
PROT UR QL STRIP: NEGATIVE
QT INTERVAL: 358 MS
RBC # BLD AUTO: 4.49 10*6/MM3 (ref 3.77–5.28)
RBC # UR STRIP: ABNORMAL /HPF
REF LAB TEST METHOD: ABNORMAL
SARS-COV-2 RNA RESP QL NAA+PROBE: NOT DETECTED
SODIUM SERPL-SCNC: 138 MMOL/L (ref 136–145)
SP GR UR STRIP: 1.01 (ref 1–1.03)
SQUAMOUS #/AREA URNS HPF: ABNORMAL /HPF
TROPONIN T SERPL-MCNC: <0.01 NG/ML (ref 0–0.03)
UROBILINOGEN UR QL STRIP: ABNORMAL
WBC # UR STRIP: ABNORMAL /HPF
WBC NRBC COR # BLD: 9.4 10*3/MM3 (ref 3.4–10.8)
WHOLE BLOOD HOLD COAG: NORMAL
WHOLE BLOOD HOLD SPECIMEN: NORMAL

## 2022-08-18 PROCEDURE — 99284 EMERGENCY DEPT VISIT MOD MDM: CPT

## 2022-08-18 PROCEDURE — 80307 DRUG TEST PRSMV CHEM ANLYZR: CPT | Performed by: EMERGENCY MEDICINE

## 2022-08-18 PROCEDURE — 84484 ASSAY OF TROPONIN QUANT: CPT | Performed by: EMERGENCY MEDICINE

## 2022-08-18 PROCEDURE — 82077 ASSAY SPEC XCP UR&BREATH IA: CPT | Performed by: PHYSICIAN ASSISTANT

## 2022-08-18 PROCEDURE — 93010 ELECTROCARDIOGRAM REPORT: CPT | Performed by: INTERNAL MEDICINE

## 2022-08-18 PROCEDURE — 71045 X-RAY EXAM CHEST 1 VIEW: CPT

## 2022-08-18 PROCEDURE — 25010000002 HALOPERIDOL LACTATE PER 5 MG: Performed by: INTERNAL MEDICINE

## 2022-08-18 PROCEDURE — 81001 URINALYSIS AUTO W/SCOPE: CPT | Performed by: EMERGENCY MEDICINE

## 2022-08-18 PROCEDURE — 80053 COMPREHEN METABOLIC PANEL: CPT | Performed by: EMERGENCY MEDICINE

## 2022-08-18 PROCEDURE — 25010000002 CEFTRIAXONE PER 250 MG: Performed by: PHYSICIAN ASSISTANT

## 2022-08-18 PROCEDURE — 83735 ASSAY OF MAGNESIUM: CPT | Performed by: EMERGENCY MEDICINE

## 2022-08-18 PROCEDURE — 87086 URINE CULTURE/COLONY COUNT: CPT | Performed by: EMERGENCY MEDICINE

## 2022-08-18 PROCEDURE — U0003 INFECTIOUS AGENT DETECTION BY NUCLEIC ACID (DNA OR RNA); SEVERE ACUTE RESPIRATORY SYNDROME CORONAVIRUS 2 (SARS-COV-2) (CORONAVIRUS DISEASE [COVID-19]), AMPLIFIED PROBE TECHNIQUE, MAKING USE OF HIGH THROUGHPUT TECHNOLOGIES AS DESCRIBED BY CMS-2020-01-R: HCPCS | Performed by: PHYSICIAN ASSISTANT

## 2022-08-18 PROCEDURE — 87077 CULTURE AEROBIC IDENTIFY: CPT | Performed by: EMERGENCY MEDICINE

## 2022-08-18 PROCEDURE — 87186 SC STD MICRODIL/AGAR DIL: CPT | Performed by: EMERGENCY MEDICINE

## 2022-08-18 PROCEDURE — 82962 GLUCOSE BLOOD TEST: CPT

## 2022-08-18 PROCEDURE — 83605 ASSAY OF LACTIC ACID: CPT | Performed by: PHYSICIAN ASSISTANT

## 2022-08-18 PROCEDURE — 85025 COMPLETE CBC W/AUTO DIFF WBC: CPT | Performed by: EMERGENCY MEDICINE

## 2022-08-18 PROCEDURE — 25010000002 DROPERIDOL PER 5 MG: Performed by: PHYSICIAN ASSISTANT

## 2022-08-18 PROCEDURE — 93005 ELECTROCARDIOGRAM TRACING: CPT | Performed by: EMERGENCY MEDICINE

## 2022-08-18 PROCEDURE — 90791 PSYCH DIAGNOSTIC EVALUATION: CPT

## 2022-08-18 RX ORDER — INSULIN LISPRO 100 [IU]/ML
0-9 INJECTION, SOLUTION INTRAVENOUS; SUBCUTANEOUS
Status: DISCONTINUED | OUTPATIENT
Start: 2022-08-19 | End: 2022-08-20 | Stop reason: HOSPADM

## 2022-08-18 RX ORDER — PANTOPRAZOLE SODIUM 40 MG/1
40 TABLET, DELAYED RELEASE ORAL DAILY
Status: DISCONTINUED | OUTPATIENT
Start: 2022-08-19 | End: 2022-08-20 | Stop reason: HOSPADM

## 2022-08-18 RX ORDER — DEXTROSE MONOHYDRATE 25 G/50ML
25 INJECTION, SOLUTION INTRAVENOUS
Status: DISCONTINUED | OUTPATIENT
Start: 2022-08-18 | End: 2022-08-20 | Stop reason: HOSPADM

## 2022-08-18 RX ORDER — UREA 10 %
3 LOTION (ML) TOPICAL NIGHTLY PRN
Status: DISCONTINUED | OUTPATIENT
Start: 2022-08-18 | End: 2022-08-20 | Stop reason: HOSPADM

## 2022-08-18 RX ORDER — FLUOXETINE 10 MG/1
10 CAPSULE ORAL DAILY
Status: DISCONTINUED | OUTPATIENT
Start: 2022-08-19 | End: 2022-08-20 | Stop reason: HOSPADM

## 2022-08-18 RX ORDER — HALOPERIDOL 5 MG/ML
1 INJECTION INTRAMUSCULAR ONCE
Status: COMPLETED | OUTPATIENT
Start: 2022-08-18 | End: 2022-08-18

## 2022-08-18 RX ORDER — DONEPEZIL HYDROCHLORIDE 10 MG/1
10 TABLET, FILM COATED ORAL NIGHTLY
Status: DISCONTINUED | OUTPATIENT
Start: 2022-08-18 | End: 2022-08-20 | Stop reason: HOSPADM

## 2022-08-18 RX ORDER — ARIPIPRAZOLE 5 MG/1
5 TABLET ORAL DAILY
Status: DISCONTINUED | OUTPATIENT
Start: 2022-08-19 | End: 2022-08-20 | Stop reason: HOSPADM

## 2022-08-18 RX ORDER — ACETAMINOPHEN 325 MG/1
650 TABLET ORAL EVERY 4 HOURS PRN
Status: DISCONTINUED | OUTPATIENT
Start: 2022-08-18 | End: 2022-08-20 | Stop reason: HOSPADM

## 2022-08-18 RX ORDER — LOSARTAN POTASSIUM 25 MG/1
25 TABLET ORAL DAILY
Status: DISCONTINUED | OUTPATIENT
Start: 2022-08-19 | End: 2022-08-20 | Stop reason: HOSPADM

## 2022-08-18 RX ORDER — SODIUM CHLORIDE 0.9 % (FLUSH) 0.9 %
10 SYRINGE (ML) INJECTION AS NEEDED
Status: DISCONTINUED | OUTPATIENT
Start: 2022-08-18 | End: 2022-08-20 | Stop reason: HOSPADM

## 2022-08-18 RX ORDER — DROPERIDOL 2.5 MG/ML
5 INJECTION, SOLUTION INTRAMUSCULAR; INTRAVENOUS ONCE
Status: COMPLETED | OUTPATIENT
Start: 2022-08-18 | End: 2022-08-18

## 2022-08-18 RX ORDER — NICOTINE POLACRILEX 4 MG
15 LOZENGE BUCCAL
Status: DISCONTINUED | OUTPATIENT
Start: 2022-08-18 | End: 2022-08-20 | Stop reason: HOSPADM

## 2022-08-18 RX ORDER — ONDANSETRON 2 MG/ML
4 INJECTION INTRAMUSCULAR; INTRAVENOUS EVERY 6 HOURS PRN
Status: DISCONTINUED | OUTPATIENT
Start: 2022-08-18 | End: 2022-08-20 | Stop reason: HOSPADM

## 2022-08-18 RX ORDER — NITROGLYCERIN 0.4 MG/1
0.4 TABLET SUBLINGUAL
Status: DISCONTINUED | OUTPATIENT
Start: 2022-08-18 | End: 2022-08-20 | Stop reason: HOSPADM

## 2022-08-18 RX ORDER — ONDANSETRON 4 MG/1
4 TABLET, FILM COATED ORAL EVERY 6 HOURS PRN
Status: DISCONTINUED | OUTPATIENT
Start: 2022-08-18 | End: 2022-08-20 | Stop reason: HOSPADM

## 2022-08-18 RX ADMIN — CEFTRIAXONE SODIUM 1 G: 1 INJECTION, POWDER, FOR SOLUTION INTRAMUSCULAR; INTRAVENOUS at 17:37

## 2022-08-18 RX ADMIN — SODIUM CHLORIDE 1000 ML: 9 INJECTION, SOLUTION INTRAVENOUS at 21:23

## 2022-08-18 RX ADMIN — DROPERIDOL 5 MG: 2.5 INJECTION, SOLUTION INTRAMUSCULAR; INTRAVENOUS at 16:42

## 2022-08-18 RX ADMIN — HALOPERIDOL LACTATE 1 MG: 5 INJECTION, SOLUTION INTRAMUSCULAR at 23:11

## 2022-08-18 RX ADMIN — DROPERIDOL 5 MG: 2.5 INJECTION, SOLUTION INTRAMUSCULAR; INTRAVENOUS at 18:31

## 2022-08-18 RX ADMIN — Medication 3 MG: at 22:32

## 2022-08-18 RX ADMIN — DONEPEZIL HYDROCHLORIDE 10 MG: 10 TABLET, FILM COATED ORAL at 22:32

## 2022-08-18 RX ADMIN — Medication 10 ML: at 16:43

## 2022-08-18 NOTE — ED PROVIDER NOTES
EMERGENCY DEPARTMENT ENCOUNTER    Room Number:  S522/1  Date of encounter:  8/18/2022  PCP: Akosua Staley APRN  Historian:       I used full protective equipment while examining this patient.  This includes face mask, gloves and protective eyewear.  I washed my hands before entering the room and immediately upon leaving the room      HPI:  Chief Complaint: Altered mental status  A complete HPI/ROS/PMH/PSH/SH/FH are unobtainable due to: Altered mental status    Context: Krupa Mcmanus is a 66 y.o. female who presents to the ED c/o 1 day history of acute on chronic altered mental status.  According to the  the patient does have a history of bipolar disorder, dementia, anxiety, depression.  Has been states over the past several days the patient has been more lethargic and fatigued.  This afternoon the patient started acting out and screaming.  This is abnormal for the patient according to the .  He denies any drug or alcohol use.    Review of Medical Records  Reviewed patient's last ER visit from 7/1/2022.  Patient seen for UTI, lactic acidosis.    PAST MEDICAL HISTORY  Active Ambulatory Problems     Diagnosis Date Noted   • Affective psychosis, bipolar (HCC) 07/11/2018   • Essential hypertension 07/12/2018   • Mild intermittent asthma without complication 07/12/2018   • Hypokalemia 07/12/2018   • Leukocytosis 07/12/2018   • Elevated liver enzymes 07/12/2018   • Type 2 diabetes mellitus (HCC) 07/12/2018     Resolved Ambulatory Problems     Diagnosis Date Noted   • No Resolved Ambulatory Problems     Past Medical History:   Diagnosis Date   • Alzheimer disease (HCC)    • Anxiety    • Bipolar 1 disorder (HCC)    • Dementia (HCC)    • Depression    • Diabetes mellitus (HCC)    • GERD (gastroesophageal reflux disease)    • OCD (obsessive compulsive disorder)    • Rheumatoid arthritis (HCC)    • UTI (urinary tract infection)          PAST SURGICAL HISTORY  Past Surgical History:   Procedure  Laterality Date   • CHOLECYSTECTOMY     • HYSTERECTOMY           FAMILY HISTORY  Family History   Problem Relation Age of Onset   • Cancer Father         unknown         SOCIAL HISTORY  Social History     Socioeconomic History   • Marital status:    Tobacco Use   • Smoking status: Former Smoker   Substance and Sexual Activity   • Alcohol use: Never         ALLERGIES  Levofloxacin, Sulfamethoxazole, Amoxicillin, Benadryl [diphenhydramine], Ceclor [cefaclor], Penicillins, and Zyprexa [olanzapine]        REVIEW OF SYSTEMS  Unobtainable secondary to altered mental status      PHYSICAL EXAM    I have reviewed the triage vital signs and nursing notes.    ED Triage Vitals   Temp Heart Rate Resp BP SpO2   08/18/22 1554 08/18/22 1553 08/18/22 1556 -- 08/18/22 1553   98.1 °F (36.7 °C) 109 16  97 %      Temp src Heart Rate Source Patient Position BP Location FiO2 (%)   08/18/22 1554 -- -- -- --   Tympanic           Physical Exam  GENERAL: Alert, agitated, screaming, moderate distress   HENT: head atraumatic, no nuchal rigidity  EYES: no scleral icterus, EOMI  CV: regular rhythm, regular rate, no murmur  RESPIRATORY: normal effort, CTA  ABDOMEN: soft, nontender  MUSCULOSKELETAL: no deformity, FROM, no calf swelling or tenderness  NEURO: alert, oriented to self, pressured speech, no dysarthria   SKIN: warm, dry        LAB RESULTS  Recent Results (from the past 24 hour(s))   Comprehensive Metabolic Panel    Collection Time: 08/18/22  4:37 PM    Specimen: Blood   Result Value Ref Range    Glucose 177 (H) 65 - 99 mg/dL    BUN 13 8 - 23 mg/dL    Creatinine 0.84 0.57 - 1.00 mg/dL    Sodium 138 136 - 145 mmol/L    Potassium 3.9 3.5 - 5.2 mmol/L    Chloride 101 98 - 107 mmol/L    CO2 19.5 (L) 22.0 - 29.0 mmol/L    Calcium 9.3 8.6 - 10.5 mg/dL    Total Protein 6.7 6.0 - 8.5 g/dL    Albumin 4.00 3.50 - 5.20 g/dL    ALT (SGPT) 41 (H) 1 - 33 U/L    AST (SGOT) 38 (H) 1 - 32 U/L    Alkaline Phosphatase 112 39 - 117 U/L    Total  Bilirubin 0.5 0.0 - 1.2 mg/dL    Globulin 2.7 gm/dL    A/G Ratio 1.5 g/dL    BUN/Creatinine Ratio 15.5 7.0 - 25.0    Anion Gap 17.5 (H) 5.0 - 15.0 mmol/L    eGFR 75.8 >60.0 mL/min/1.73   Troponin    Collection Time: 08/18/22  4:37 PM    Specimen: Blood   Result Value Ref Range    Troponin T <0.010 0.000 - 0.030 ng/mL   Magnesium    Collection Time: 08/18/22  4:37 PM    Specimen: Blood   Result Value Ref Range    Magnesium 1.8 1.6 - 2.4 mg/dL   Urinalysis With Culture If Indicated - Urine, Clean Catch    Collection Time: 08/18/22  4:37 PM    Specimen: Urine, Clean Catch   Result Value Ref Range    Color, UA Yellow Yellow, Straw    Appearance, UA Cloudy (A) Clear    pH, UA 6.5 5.0 - 8.0    Specific Gravity, UA 1.014 1.005 - 1.030    Glucose, UA Negative Negative    Ketones, UA Negative Negative    Bilirubin, UA Negative Negative    Blood, UA Negative Negative    Protein, UA Negative Negative    Leuk Esterase, UA Moderate (2+) (A) Negative    Nitrite, UA Positive (A) Negative    Urobilinogen, UA 1.0 E.U./dL 0.2 - 1.0 E.U./dL   Green Top (Gel)    Collection Time: 08/18/22  4:37 PM   Result Value Ref Range    Extra Tube Hold for add-ons.    Lavender Top    Collection Time: 08/18/22  4:37 PM   Result Value Ref Range    Extra Tube hold for add-on    Gold Top - SST    Collection Time: 08/18/22  4:37 PM   Result Value Ref Range    Extra Tube Hold for add-ons.    Light Blue Top    Collection Time: 08/18/22  4:37 PM   Result Value Ref Range    Extra Tube Hold for add-ons.    CBC Auto Differential    Collection Time: 08/18/22  4:37 PM    Specimen: Blood   Result Value Ref Range    WBC 9.40 3.40 - 10.80 10*3/mm3    RBC 4.49 3.77 - 5.28 10*6/mm3    Hemoglobin 11.5 (L) 12.0 - 15.9 g/dL    Hematocrit 36.4 34.0 - 46.6 %    MCV 81.1 79.0 - 97.0 fL    MCH 25.6 (L) 26.6 - 33.0 pg    MCHC 31.6 31.5 - 35.7 g/dL    RDW 16.1 (H) 12.3 - 15.4 %    RDW-SD 46.6 37.0 - 54.0 fl    MPV 10.3 6.0 - 12.0 fL    Platelets 284 140 - 450 10*3/mm3     Neutrophil % 75.7 42.7 - 76.0 %    Lymphocyte % 17.0 (L) 19.6 - 45.3 %    Monocyte % 5.5 5.0 - 12.0 %    Eosinophil % 1.0 0.3 - 6.2 %    Basophil % 0.5 0.0 - 1.5 %    Immature Grans % 0.3 0.0 - 0.5 %    Neutrophils, Absolute 7.11 (H) 1.70 - 7.00 10*3/mm3    Lymphocytes, Absolute 1.60 0.70 - 3.10 10*3/mm3    Monocytes, Absolute 0.52 0.10 - 0.90 10*3/mm3    Eosinophils, Absolute 0.09 0.00 - 0.40 10*3/mm3    Basophils, Absolute 0.05 0.00 - 0.20 10*3/mm3    Immature Grans, Absolute 0.03 0.00 - 0.05 10*3/mm3    nRBC 0.0 0.0 - 0.2 /100 WBC   Ethanol    Collection Time: 08/18/22  4:37 PM    Specimen: Blood   Result Value Ref Range    Ethanol <10 0 - 10 mg/dL    Ethanol % <0.010 %   Urine Drug Screen - Urine, Clean Catch    Collection Time: 08/18/22  4:37 PM    Specimen: Urine, Clean Catch   Result Value Ref Range    Amphet/Methamphet, Screen Negative Negative    Barbiturates Screen, Urine Negative Negative    Benzodiazepine Screen, Urine Negative Negative    Cocaine Screen, Urine Negative Negative    Opiate Screen Negative Negative    THC, Screen, Urine Negative Negative    Methadone Screen, Urine Negative Negative    Oxycodone Screen, Urine Negative Negative   Urinalysis, Microscopic Only - Urine, Clean Catch    Collection Time: 08/18/22  4:37 PM    Specimen: Urine, Clean Catch   Result Value Ref Range    RBC, UA 0-2 None Seen, 0-2 /HPF    WBC, UA Too Numerous to Count (A) None Seen, 0-2 /HPF    Bacteria, UA 4+ (A) None Seen /HPF    Squamous Epithelial Cells, UA 0-2 None Seen, 0-2 /HPF    Hyaline Casts, UA 7-12 None Seen /LPF    Methodology Automated Microscopy    ECG 12 Lead    Collection Time: 08/18/22  4:49 PM   Result Value Ref Range    QT Interval 358 ms   COVID-19,[facility] IN-HOUSE CEPHEID/MERCEDES NP SWAB IN TRANSPORT MEDIA 8-12 HR TAT - Swab, Nasopharynx    Collection Time: 08/18/22  5:36 PM    Specimen: Nasopharynx; Swab   Result Value Ref Range    COVID19 Not Detected Not Detected - Ref. Range   Lactic Acid, Plasma     Collection Time: 08/18/22  6:07 PM    Specimen: Blood   Result Value Ref Range    Lactate 4.3 (C) 0.5 - 2.0 mmol/L       Ordered the above labs and independently reviewed the results.        RADIOLOGY  XR Chest 1 View    Result Date: 8/18/2022  XR CHEST 1 VW-  HISTORY:  Weak, dizzy, altered mental status.  COMPARISON:  Chest radiograph 06/09/2022  FINDINGS:  A single portable view of the chest was obtained. The cardiac silhouette is upper normal in size. There is calcific aortic atherosclerosis. There are low lung volumes. There is no focal consolidation or effusion. There is multilevel degenerative disc disease. There are surgical clips projecting over the right upper quadrant.  This report was finalized on 8/18/2022 4:26 PM by Dr. Shahla CRUZ I ordered the above noted radiological studies. Reviewed by me and discussed with radiologist.  See dictation for official radiology interpretation.      MEDICATIONS GIVEN IN ER    Medications   sodium chloride 0.9 % flush 10 mL (10 mL Intravenous Given 8/18/22 1643)   sodium chloride 0.9 % bolus 1,000 mL (has no administration in time range)   nitroglycerin (NITROSTAT) SL tablet 0.4 mg (has no administration in time range)   acetaminophen (TYLENOL) tablet 650 mg (has no administration in time range)   ondansetron (ZOFRAN) tablet 4 mg (has no administration in time range)     Or   ondansetron (ZOFRAN) injection 4 mg (has no administration in time range)   melatonin tablet 3 mg (has no administration in time range)   cefTRIAXone (ROCEPHIN) 1 g in sodium chloride 0.9 % 100 mL IVPB-VTB (has no administration in time range)   ARIPiprazole (ABILIFY) tablet 5 mg (has no administration in time range)   donepezil (ARICEPT) tablet 10 mg (has no administration in time range)   FLUoxetine (PROzac) capsule 10 mg (has no administration in time range)   losartan (COZAAR) tablet 25 mg (has no administration in time range)   pantoprazole (PROTONIX) EC tablet 40 mg (has no  administration in time range)   tiotropium (SPIRIVA RESPIMAT) 2.5 mcg/act aerosol solution inhaler (has no administration in time range)   dextrose (GLUTOSE) oral gel 15 g (has no administration in time range)   dextrose (D50W) (25 g/50 mL) IV injection 25 g (has no administration in time range)   glucagon (human recombinant) (GLUCAGEN DIAGNOSTIC) injection 1 mg (has no administration in time range)   insulin lispro (ADMELOG) injection 0-9 Units (has no administration in time range)   droperidol (INAPSINE) injection 5 mg (5 mg Intravenous Given 8/18/22 1642)   cefTRIAXone (ROCEPHIN) 1 g in sodium chloride 0.9 % 100 mL IVPB-VTB (0 g Intravenous Stopped 8/18/22 1826)   droperidol (INAPSINE) injection 5 mg (5 mg Intravenous Given 8/18/22 1831)         PROGRESS, DATA ANALYSIS, CONSULTS, AND MEDICAL DECISION MAKING    All labs have been independently reviewed by me.  All radiology studies have been reviewed by me and discussed with radiologist dictating the report.   EKG's independently viewed and interpreted by me.  Discussion below represents my analysis of pertinent findings related to patient's condition, differential diagnosis, treatment plan and final disposition.    I have discussed case with Dr. Montano, emergency room physician.  He has performed his own bedside examination and agrees with treatment plan.    ED Course as of 08/18/22 2106   Thu Aug 18, 2022   1635 Patient presents with 1 day history of acute on chronic confusion.  Differential diagnoses include not limited to psychosis, sepsis, encephalopathic. [EE]   1645 Chest x-ray interpreted myself shows no acute infiltrate. [EE]   1702 WBC: 9.40 [EE]   1702 Hemoglobin(!): 11.5 [EE]   1710 Patient with acute UTI and altered mental status.  Plan to admit the patient.  We will have access evaluate as well for underlying bipolar disease and depression. [EE]   1724 I discussed the case with Dr. Rodrigez, [facility].  She agrees to admit. [EE]      ED Course User Index  [EE]  Hermes Denise PA       AS OF 21:06 EDT VITALS:    BP - 125/75  HR - 97  TEMP - 98.4 °F (36.9 °C) (Oral)  O2 SATS - 94%        DIAGNOSIS  Final diagnoses:   Acute UTI   Encephalopathy   Dementia with behavioral disturbance, unspecified dementia type (HCC)         DISPOSITION  Admitted      Dictated utilizing Dragon dictation     Hermes Denise PA  08/18/22 8405

## 2022-08-18 NOTE — ED NOTES
Pt is hallucinating per family. Pt is more confused than normal for about 3-5 days. Pt sent by PCP for possible UTI. Pt hx of dementia.

## 2022-08-19 PROBLEM — E11.65 TYPE 2 DIABETES MELLITUS WITH HYPERGLYCEMIA: Status: ACTIVE | Noted: 2018-07-12

## 2022-08-19 PROBLEM — G93.41 METABOLIC ENCEPHALOPATHY: Status: ACTIVE | Noted: 2022-08-19

## 2022-08-19 PROBLEM — F03.918 DEMENTIA WITH BEHAVIORAL DISTURBANCE: Status: ACTIVE | Noted: 2022-08-19

## 2022-08-19 LAB
ANION GAP SERPL CALCULATED.3IONS-SCNC: 12.3 MMOL/L (ref 5–15)
BUN SERPL-MCNC: 10 MG/DL (ref 8–23)
BUN/CREAT SERPL: 16.7 (ref 7–25)
CALCIUM SPEC-SCNC: 8.6 MG/DL (ref 8.6–10.5)
CHLORIDE SERPL-SCNC: 104 MMOL/L (ref 98–107)
CO2 SERPL-SCNC: 23.7 MMOL/L (ref 22–29)
CREAT SERPL-MCNC: 0.6 MG/DL (ref 0.57–1)
D-LACTATE SERPL-SCNC: 1.8 MMOL/L (ref 0.5–2)
DEPRECATED RDW RBC AUTO: 47.9 FL (ref 37–54)
EGFRCR SERPLBLD CKD-EPI 2021: 97.9 ML/MIN/1.73
ERYTHROCYTE [DISTWIDTH] IN BLOOD BY AUTOMATED COUNT: 16.2 % (ref 12.3–15.4)
GLUCOSE BLDC GLUCOMTR-MCNC: 101 MG/DL (ref 70–130)
GLUCOSE BLDC GLUCOMTR-MCNC: 125 MG/DL (ref 70–130)
GLUCOSE BLDC GLUCOMTR-MCNC: 130 MG/DL (ref 70–130)
GLUCOSE BLDC GLUCOMTR-MCNC: 134 MG/DL (ref 70–130)
GLUCOSE SERPL-MCNC: 128 MG/DL (ref 65–99)
HBA1C MFR BLD: 6.6 % (ref 4.8–5.6)
HCT VFR BLD AUTO: 31.2 % (ref 34–46.6)
HGB BLD-MCNC: 9.7 G/DL (ref 12–15.9)
MCH RBC QN AUTO: 25.4 PG (ref 26.6–33)
MCHC RBC AUTO-ENTMCNC: 31.1 G/DL (ref 31.5–35.7)
MCV RBC AUTO: 81.7 FL (ref 79–97)
PLATELET # BLD AUTO: 238 10*3/MM3 (ref 140–450)
PMV BLD AUTO: 10.4 FL (ref 6–12)
POTASSIUM SERPL-SCNC: 3.7 MMOL/L (ref 3.5–5.2)
RBC # BLD AUTO: 3.82 10*6/MM3 (ref 3.77–5.28)
SODIUM SERPL-SCNC: 140 MMOL/L (ref 136–145)
WBC NRBC COR # BLD: 6.75 10*3/MM3 (ref 3.4–10.8)

## 2022-08-19 PROCEDURE — 85027 COMPLETE CBC AUTOMATED: CPT | Performed by: INTERNAL MEDICINE

## 2022-08-19 PROCEDURE — 36415 COLL VENOUS BLD VENIPUNCTURE: CPT | Performed by: INTERNAL MEDICINE

## 2022-08-19 PROCEDURE — 83036 HEMOGLOBIN GLYCOSYLATED A1C: CPT | Performed by: INTERNAL MEDICINE

## 2022-08-19 PROCEDURE — 25010000002 HALOPERIDOL LACTATE PER 5 MG: Performed by: SPECIALIST

## 2022-08-19 PROCEDURE — 25010000002 CEFTRIAXONE PER 250 MG: Performed by: INTERNAL MEDICINE

## 2022-08-19 PROCEDURE — 25010000002 ONDANSETRON PER 1 MG: Performed by: INTERNAL MEDICINE

## 2022-08-19 PROCEDURE — 80048 BASIC METABOLIC PNL TOTAL CA: CPT | Performed by: INTERNAL MEDICINE

## 2022-08-19 PROCEDURE — 82962 GLUCOSE BLOOD TEST: CPT

## 2022-08-19 PROCEDURE — 94640 AIRWAY INHALATION TREATMENT: CPT

## 2022-08-19 RX ORDER — FLUTICASONE PROPIONATE 50 MCG
2 SPRAY, SUSPENSION (ML) NASAL DAILY
Status: ON HOLD | COMMUNITY
End: 2023-03-07

## 2022-08-19 RX ORDER — POTASSIUM CHLORIDE 20MEQ/15ML
20 LIQUID (ML) ORAL DAILY
COMMUNITY
End: 2022-08-20 | Stop reason: HOSPADM

## 2022-08-19 RX ORDER — QUETIAPINE 200 MG/1
200-400 TABLET, FILM COATED, EXTENDED RELEASE ORAL NIGHTLY
Status: ON HOLD | COMMUNITY
End: 2022-08-19

## 2022-08-19 RX ORDER — NYSTATIN 100000 [USP'U]/G
POWDER TOPICAL 3 TIMES DAILY
Status: ON HOLD | COMMUNITY
End: 2023-03-07

## 2022-08-19 RX ORDER — GUAIFENESIN 600 MG/1
1200 TABLET, EXTENDED RELEASE ORAL 2 TIMES DAILY PRN
Status: ON HOLD | COMMUNITY
End: 2023-03-07

## 2022-08-19 RX ORDER — ARIPIPRAZOLE 5 MG/1
5 TABLET ORAL DAILY
COMMUNITY

## 2022-08-19 RX ORDER — ATORVASTATIN CALCIUM 20 MG/1
20 TABLET, FILM COATED ORAL NIGHTLY
Status: ON HOLD | COMMUNITY
End: 2023-03-07

## 2022-08-19 RX ORDER — UREA 10 %
3 LOTION (ML) TOPICAL NIGHTLY PRN
Status: ON HOLD | COMMUNITY
End: 2023-03-07

## 2022-08-19 RX ORDER — DIVALPROEX SODIUM 250 MG/1
250 TABLET, EXTENDED RELEASE ORAL NIGHTLY
Status: DISCONTINUED | OUTPATIENT
Start: 2022-08-19 | End: 2022-08-20 | Stop reason: HOSPADM

## 2022-08-19 RX ORDER — DIVALPROEX SODIUM 250 MG/1
250 TABLET, EXTENDED RELEASE ORAL NIGHTLY
Status: ON HOLD | COMMUNITY
End: 2023-03-14 | Stop reason: SDUPTHER

## 2022-08-19 RX ORDER — LORATADINE 10 MG/1
10 TABLET ORAL DAILY
Status: ON HOLD | COMMUNITY
End: 2023-03-07

## 2022-08-19 RX ORDER — QUETIAPINE FUMARATE 100 MG/1
200 TABLET, FILM COATED ORAL NIGHTLY
Status: ON HOLD | COMMUNITY
End: 2023-03-07

## 2022-08-19 RX ORDER — OXYBUTYNIN CHLORIDE 5 MG/1
5 TABLET ORAL 2 TIMES DAILY
Status: ON HOLD | COMMUNITY
End: 2023-03-07

## 2022-08-19 RX ORDER — MIRTAZAPINE 15 MG/1
15 TABLET, FILM COATED ORAL NIGHTLY
COMMUNITY

## 2022-08-19 RX ORDER — VENLAFAXINE 37.5 MG/1
37.5 TABLET ORAL 2 TIMES DAILY
Status: ON HOLD | COMMUNITY
End: 2023-03-07

## 2022-08-19 RX ORDER — FUROSEMIDE 40 MG/1
40 TABLET ORAL 2 TIMES DAILY
COMMUNITY
End: 2022-08-20 | Stop reason: HOSPADM

## 2022-08-19 RX ORDER — HALOPERIDOL 5 MG/ML
2 INJECTION INTRAMUSCULAR EVERY 6 HOURS PRN
Status: DISCONTINUED | OUTPATIENT
Start: 2022-08-19 | End: 2022-08-20 | Stop reason: HOSPADM

## 2022-08-19 RX ORDER — ACETAMINOPHEN 325 MG/1
650 TABLET ORAL EVERY 4 HOURS PRN
Status: ON HOLD | COMMUNITY
End: 2023-03-07

## 2022-08-19 RX ORDER — ALBUTEROL SULFATE 90 UG/1
2 AEROSOL, METERED RESPIRATORY (INHALATION) EVERY 4 HOURS PRN
Status: ON HOLD | COMMUNITY
End: 2023-03-07

## 2022-08-19 RX ORDER — MEMANTINE HYDROCHLORIDE 5 MG/1
10 TABLET ORAL 2 TIMES DAILY
Status: ON HOLD | COMMUNITY
End: 2023-03-07

## 2022-08-19 RX ADMIN — ARIPIPRAZOLE 5 MG: 5 TABLET ORAL at 09:08

## 2022-08-19 RX ADMIN — DONEPEZIL HYDROCHLORIDE 10 MG: 10 TABLET, FILM COATED ORAL at 19:40

## 2022-08-19 RX ADMIN — PANTOPRAZOLE SODIUM 40 MG: 40 TABLET, DELAYED RELEASE ORAL at 09:08

## 2022-08-19 RX ADMIN — LOSARTAN POTASSIUM 25 MG: 25 TABLET, FILM COATED ORAL at 09:08

## 2022-08-19 RX ADMIN — CEFTRIAXONE SODIUM 1 G: 1 INJECTION, POWDER, FOR SOLUTION INTRAMUSCULAR; INTRAVENOUS at 17:55

## 2022-08-19 RX ADMIN — FLUOXETINE HYDROCHLORIDE 10 MG: 10 CAPSULE ORAL at 09:08

## 2022-08-19 RX ADMIN — ACETAMINOPHEN 650 MG: 325 TABLET, FILM COATED ORAL at 23:29

## 2022-08-19 RX ADMIN — TIOTROPIUM BROMIDE INHALATION SPRAY 1 PUFF: 3.12 SPRAY, METERED RESPIRATORY (INHALATION) at 09:00

## 2022-08-19 RX ADMIN — DIVALPROEX SODIUM 250 MG: 250 TABLET, EXTENDED RELEASE ORAL at 19:39

## 2022-08-19 RX ADMIN — HALOPERIDOL LACTATE 2 MG: 5 INJECTION, SOLUTION INTRAMUSCULAR at 13:24

## 2022-08-19 RX ADMIN — HALOPERIDOL LACTATE 2 MG: 5 INJECTION, SOLUTION INTRAMUSCULAR at 19:39

## 2022-08-19 RX ADMIN — ONDANSETRON 4 MG: 2 INJECTION INTRAMUSCULAR; INTRAVENOUS at 18:03

## 2022-08-19 NOTE — PROGRESS NOTES
[facility] Clinical Pharmacy Services: Medication Reconciliation     Krupa Mcmanus is a 66 y.o. female presenting to [facility] for Encephalopathy [G93.40]  Acute UTI [N39.0]  Dementia with behavioral disturbance, unspecified dementia type (HCC) [F03.91]       She  has a past medical history of Alzheimer disease ([facility]), Anxiety, Bipolar 1 disorder ([facility]), Dementia ([facility]), Depression, Diabetes mellitus ([facility]), GERD (gastroesophageal reflux disease), OCD (obsessive compulsive disorder), Rheumatoid arthritis ([facility]), and UTI (urinary tract infection).       Allergies as of 08/18/2022 - Reviewed 08/18/2022   Allergen Reaction Noted    Levofloxacin Anaphylaxis 03/15/2013    Sulfamethoxazole Anaphylaxis 02/08/2022    Amoxicillin Rash 09/26/2021    Benadryl [diphenhydramine] Rash 10/03/2021    Ceclor [cefaclor] Rash 09/26/2021    Penicillins Rash 07/11/2018    Zyprexa [olanzapine] Rash 07/11/2018          Medication information was obtained from: family provided medication list and cross referenced with [facility]   Pharmacy:  [facility]        Prior to Admission Medications       Prescriptions Last Dose Informant Patient Reported? Taking?    metFORMIN (GLUCOPHAGE) 500 MG tablet Patient Taking Differently  No Yes    Take 1 tablet by mouth Daily With Breakfast.    Patient taking differently:  Take 500 mg by mouth 2 (Two) Times a Day With Meals. Indications: Type 2 Diabetes    pantoprazole (PROTONIX) 40 MG EC tablet Patient Taking Differently  No Yes    Take 1 tablet by mouth Daily. Indications: Gastroesophageal Reflux Disease    Patient taking differently:  Take 40 mg by mouth 2 (Two) Times a Day. Indications: Gastroesophageal Reflux Disease    polyethylene glycol (MIRALAX) 17 g packet Patient Taking Differently  No Yes    Take 17 g by mouth Daily.    Patient taking differently:  Take 17 g by mouth Daily As Needed.    Tiotropium Bromide Monohydrate (SPIRIVA RESPIMAT) 1.25 MCG/ACT aerosol solution inhaler 8/18/2022  Pharmacy Yes Yes    Inhale 2 puffs Daily.    acetaminophen (TYLENOL) 325 MG tablet   Yes No    Take 650 mg by mouth Every 4 (Four) Hours As Needed for Mild Pain .    albuterol sulfate  (90 Base) MCG/ACT inhaler   Yes No    Inhale 2 puffs Every 4 (Four) Hours As Needed for Wheezing.    ARIPiprazole (ABILIFY) 5 MG tablet   Yes No    Take 2.5 mg by mouth Daily.    atorvastatin (LIPITOR) 20 MG tablet   Yes No    Take 20 mg by mouth Every Night.    divalproex (DEPAKOTE ER) 250 MG 24 hr tablet   Yes No    Take 250 mg by mouth Every Night.    FLUoxetine (PROzac) 10 MG capsule   No No    Take 1 capsule by mouth Daily.    Patient taking differently:  Take 80 mg by mouth Every Evening. Indications: Depression    fluticasone (FLONASE) 50 MCG/ACT nasal spray   Yes No    2 sprays into the nostril(s) as directed by provider Daily.    furosemide (LASIX) 40 MG tablet   Yes No    Take 40 mg by mouth 2 (Two) Times a Day.    guaiFENesin (MUCINEX) 600 MG 12 hr tablet   Yes No    Take 1,200 mg by mouth 2 (Two) Times a Day As Needed for Cough or Congestion.    hydrochlorothiazide (HYDRODIURIL) 25 MG tablet  Pharmacy Yes No    Take 25 mg by mouth Daily.    loratadine (CLARITIN) 10 MG tablet   Yes No    Take 10 mg by mouth Daily.    meclizine (ANTIVERT) 25 MG tablet   No No    Take 1 tablet by mouth 4 (Four) Times a Day As Needed for Dizziness.    melatonin 1 MG tablet   Yes No    Take 3 mg by mouth At Night As Needed for Sleep.    memantine (NAMENDA) 5 MG tablet   Yes No    Take 10 mg by mouth 2 (Two) Times a Day.    mirtazapine (REMERON) 15 MG tablet   Yes No    Take 15 mg by mouth Every Night.    nystatin (MYCOSTATIN) 159944 UNIT/GM powder   Yes No    Apply  topically to the appropriate area as directed 3 (Three) Times a Day.    ondansetron ODT (Zofran ODT) 4 MG disintegrating tablet   No No    Place 1 tablet on the tongue Every 8 (Eight) Hours As Needed for Nausea.    oxybutynin (DITROPAN) 5 MG tablet   Yes No    Take 5 mg by  mouth 2 (Two) Times a Day.    potassium chloride (KAYCIEL) 20 mEq/15 mL solution   Yes No    Take 20 mEq by mouth Daily.    QUEtiapine (SEROquel) 100 MG tablet   Yes No    Take 200 mg by mouth Every Night.    venlafaxine (EFFEXOR) 37.5 MG tablet   Yes No    Take 37.5 mg by mouth 2 (Two) Times a Day.           Medication notes:   The med rec has been updated with the list provided by ru and cross checked with Nicole.  Notable changes include:    1. Depakote ER, Seroquel remeron and effexor were missing  2. Namenda in place of aricept   3. Prozac dose is 80 mg per day not 10 mg  4.         Lisinopril 2.5 mg instead of losartan  5.         added albuterol inhaler, nystatin powder, loratidine, lasix and flonase    Thanks!       This medication list is complete to the best of my knowledge as of 8/19/2022       Please call if questions.     [facility], located within [facility]  Clinical Pharmacist

## 2022-08-19 NOTE — PLAN OF CARE
Goal Outcome Evaluation:  Plan of Care Reviewed With: patient        Progress: no change  Outcome Evaluation: pt is admitted post increased AMS. UA (+) for UTI. is on rocephin daily. LA 4.3>3.0, given IVF. repeat (p). was agitated, confused, yelling out and trying to get out of bed. pt is redirected but not easily redirectable. is given a dose of haldol,. which was effective.

## 2022-08-19 NOTE — PLAN OF CARE
Goal Outcome Evaluation: Pt resting in bed with no signs of distress noted at this time. Pt has been hollering out frequently for the nurse but has been redirectable today. VSS. Bed in lowest position with siderails up X2. RN will continue to monitor.

## 2022-08-19 NOTE — PROGRESS NOTES
Name: Krupa Mcmanus ADMIT: 2022   : 1955  PCP: Akosua Staley APRN    MRN: [id number] LOS: 1 days   AGE/SEX: 66 y.o. female  ROOM: [facility]     Subjective   Subjective   Yelling out for nurse but when I went into the room she seemed somewhat oriented and appropriate. Very focused on wanting to go home. She states she has UTI that is treated and she wants to go home.    Review of Systems   Constitutional: Negative for fever.   Respiratory: Negative for shortness of breath.    Cardiovascular: Negative for chest pain.   Gastrointestinal: Negative for abdominal pain.      Objective   Objective   Vital Signs  Temp:  [98.1 °F (36.7 °C)-98.5 °F (36.9 °C)] 98.2 °F (36.8 °C)  Heart Rate:  [] 85  Resp:  [16-18] 18  BP: ()/(41-75) 109/68  SpO2:  [91 %-100 %] 97 %  on   ;   Device (Oxygen Therapy): room air  Body mass index is 35.41 kg/m².    Physical Exam  Constitutional:       General: She is not in acute distress.     Appearance: Normal appearance. She is not toxic-appearing.   HENT:      Head: Normocephalic and atraumatic.   Cardiovascular:      Rate and Rhythm: Normal rate and regular rhythm.   Pulmonary:      Effort: Pulmonary effort is normal. No respiratory distress.      Breath sounds: Normal breath sounds. No wheezing, rhonchi or rales.   Abdominal:      General: Bowel sounds are normal.      Palpations: Abdomen is soft.      Tenderness: There is no abdominal tenderness. There is no guarding.      Hernia: No hernia is present.   Musculoskeletal:         General: No swelling.      Right lower leg: No edema.      Left lower leg: No edema.   Skin:     General: Skin is warm and dry.   Neurological:      General: No focal deficit present.      Mental Status: She is alert.      Comments: Oriented to self, [facility], year , situation: UTI   Psychiatric:         Mood and Affect: Mood is anxious.         Behavior: Behavior is agitated. Behavior is not aggressive.     Results Review  I  reviewed the patient's new clinical results.  Results from last 7 days   Lab Units 08/19/22  0658 08/18/22  1637   WBC 10*3/mm3 6.75 9.40   HEMOGLOBIN g/dL 9.7* 11.5*   PLATELETS 10*3/mm3 238 284     Results from last 7 days   Lab Units 08/19/22  0659 08/18/22  1637   SODIUM mmol/L 140 138   POTASSIUM mmol/L 3.7 3.9   CHLORIDE mmol/L 104 101   CO2 mmol/L 23.7 19.5*   BUN mg/dL 10 13   CREATININE mg/dL 0.60 0.84   GLUCOSE mg/dL 128* 177*     Lab Results   Component Value Date    ANIONGAP 12.3 08/19/2022     Estimated Creatinine Clearance: 106.8 mL/min (by C-G formula based on SCr of 0.6 mg/dL).    Results from last 7 days   Lab Units 08/18/22  1637   ALBUMIN g/dL 4.00   BILIRUBIN mg/dL 0.5   ALK PHOS U/L 112   AST (SGOT) U/L 38*   ALT (SGPT) U/L 41*     Results from last 7 days   Lab Units 08/19/22  0659 08/18/22  1637   CALCIUM mg/dL 8.6 9.3   ALBUMIN g/dL  --  4.00   MAGNESIUM mg/dL  --  1.8     Results from last 7 days   Lab Units 08/18/22  2357 08/18/22  2103 08/18/22  1807   LACTATE mmol/L 1.8 3.0* 4.3*     Hemoglobin A1C   Date/Time Value Ref Range Status   08/19/2022 0658 6.60 (H) 4.80 - 5.60 % Final     Glucose   Date/Time Value Ref Range Status   08/19/2022 1103 134 (H) 70 - 130 mg/dL Final     Comment:     Meter: [id number] : [id number] Rosalio Schmidt NA   08/19/2022 0629 125 70 - 130 mg/dL Final     Comment:     Meter: [id number] : [id number] Jeremiah LEES   08/18/2022 2138 114 70 - 130 mg/dL Final     Comment:     Meter: [id number] : [id number] Jeremiah LEES       No radiology results for the last day    Scheduled Meds  ARIPiprazole, 5 mg, Oral, Daily  cefTRIAXone, 1 g, Intravenous, Q24H  divalproex, 250 mg, Oral, Nightly  donepezil, 10 mg, Oral, Nightly  FLUoxetine, 10 mg, Oral, Daily  insulin lispro, 0-9 Units, Subcutaneous, TID With Meals  losartan, 25 mg, Oral, Daily  pantoprazole, 40 mg, Oral, Daily  tiotropium bromide monohydrate, 1 puff, Inhalation, Daily    Continuous Infusions    PRN Meds  •  acetaminophen  •  dextrose  •  dextrose  •  glucagon (human recombinant)  •  haloperidol lactate  •  melatonin  •  nitroglycerin  •  ondansetron **OR** ondansetron  •  sodium chloride     Diet  Diet Regular; Cardiac    I have personally reviewed:  [x]  Laboratory   [x]  Microbiology   [x]  Radiology   [x]  EKG/Telemetry SR  []  Cardiology/Vascular   []  Pathology   []  Records     Assessment/Plan     Active Hospital Problems    Diagnosis  POA   • **Acute UTI [N39.0]  Yes   • Metabolic encephalopathy [G93.41]  Unknown   • Dementia with behavioral disturbance (HCC) [F03.91]  Unknown   • Essential hypertension [I10]  Yes   • Type 2 diabetes mellitus with hyperglycemia (HCC) [E11.65]  Yes      Resolved Hospital Problems   No resolved problems to display.     66 y.o. female with a history of dementia and bipolar disorder brought in for altered mental status (yelling and acting out)    UTI with metabolic encephalopathy: Continue antibiotics awaiting culture. Now that she is on antibiotics monitor for improvement in mental status. Lactate elevation resolved    Dementia. Delirium precautions    Bipolar disorder    DM2: A1c 6.60. Controlled    SCDs for DVT prophylaxis    Discussed with patient and nursing staff    Discharge: Home with family soon in a day or 2    Randall Kwabena Brooks MD  [facility]  08/19/22

## 2022-08-19 NOTE — PROGRESS NOTES
"[facility] following; this writer reviewed chart and spoke with SHAHRIAR Garcia via phone. RN reports pt was agitated and \"wanting to get up\" overnight; she/pt was given 1 mg Haldol which helped pt calm down and sleep. No further needs/concerns noted at this time per pt/ or medical team; [facility] to continue following.   "

## 2022-08-19 NOTE — H&P
HISTORY AND PHYSICAL   [facility]        Date of Admission: 2022  Patient Identification:  Name: Krupa Mcmanus  Age: 66 y.o.  Sex: female  :  1955  MRN: [id number]                     Primary Care Physician: Akosua Staley APRN    Chief Complaint:  66 year old female who presented to the emergency room with confusion; she has a history of dementia and bipolar disorder; she has been yelling and acting out which is not normal for her according to family; she tells me that she needs to go home but can be redirected; her  was not present; she is not able to give a review of systems    History of Present Illness:   As above    Past Medical History:  Past Medical History:   Diagnosis Date   • Alzheimer disease (HCC)    • Anxiety    • Bipolar 1 disorder (HCC)    • Dementia (HCC)    • Depression    • Diabetes mellitus (HCC)    • GERD (gastroesophageal reflux disease)    • OCD (obsessive compulsive disorder)    • Rheumatoid arthritis (HCC)    • UTI (urinary tract infection)      Past Surgical History:  Past Surgical History:   Procedure Laterality Date   • CHOLECYSTECTOMY     • HYSTERECTOMY        Home Meds:  Medications Prior to Admission   Medication Sig Dispense Refill Last Dose   • ARIPiprazole (ABILIFY) 5 MG tablet Take 1 tablet by mouth Daily. 30 tablet 1    • donepezil (ARICEPT) 10 MG tablet Take 1 tablet by mouth Every Night. 30 tablet 1    • FLUoxetine (PROzac) 10 MG capsule Take 1 capsule by mouth Daily. 30 capsule 1    • hydrochlorothiazide (HYDRODIURIL) 25 MG tablet Take 25 mg by mouth Daily.      • losartan (COZAAR) 25 MG tablet Take 25 mg by mouth Daily.      • meclizine (ANTIVERT) 25 MG tablet Take 1 tablet by mouth 4 (Four) Times a Day As Needed for Dizziness. 20 tablet 0    • metFORMIN (GLUCOPHAGE) 500 MG tablet Take 1 tablet by mouth Daily With Breakfast. 30 tablet 0    • ondansetron ODT (Zofran ODT) 4 MG disintegrating tablet Place 1 tablet on the tongue Every 8  "(Eight) Hours As Needed for Nausea. 12 tablet 0    • pantoprazole (PROTONIX) 40 MG EC tablet Take 1 tablet by mouth Daily. Indications: Gastroesophageal Reflux Disease 30 tablet 0    • polyethylene glycol (MIRALAX) 17 g packet Take 17 g by mouth Daily. 10 each 0    • sucralfate (CARAFATE) 1 g tablet Take 1 tablet by mouth 4 (Four) Times a Day. 20 tablet 0    • Tiotropium Bromide Monohydrate (SPIRIVA RESPIMAT) 1.25 MCG/ACT aerosol solution inhaler Inhale 2 puffs Daily.          Allergies:  Allergies   Allergen Reactions   • Levofloxacin Anaphylaxis   • Sulfamethoxazole Anaphylaxis   • Amoxicillin Rash   • Benadryl [Diphenhydramine] Rash   • Ceclor [Cefaclor] Rash   • Penicillins Rash     unknown   • Zyprexa [Olanzapine] Rash     Immunizations:  Immunization History   Administered Date(s) Administered   • COVID-19 (PFIZER) PURPLE CAP 2021, 2021     Social History:   Social History     Social History Narrative   • Not on file     Social History     Socioeconomic History   • Marital status:    Tobacco Use   • Smoking status: Former Smoker   Substance and Sexual Activity   • Alcohol use: Never       Family History:  Family History   Problem Relation Age of Onset   • Cancer Father         unknown        Review of Systems  Not obtainable  Objective:  T Max 24 hrs: Temp (24hrs), Av.3 °F (36.8 °C), Min:98.1 °F (36.7 °C), Max:98.4 °F (36.9 °C)    Vitals Ranges:   Temp:  [98.1 °F (36.7 °C)-98.4 °F (36.9 °C)] 98.4 °F (36.9 °C)  Heart Rate:  [] 97  Resp:  [16-18] 18  BP: ()/(41-75) 125/75      Exam:  /75 (BP Location: Left arm, Patient Position: Lying)   Pulse 97   Temp 98.4 °F (36.9 °C) (Oral)   Resp 18   Ht 167.6 cm (66\")   Wt 111 kg (245 lb)   LMP 2018 Comment: postmenopausal  SpO2 94%   BMI 39.54 kg/m²     General Appearance:    Alert, chronically ill appearing appears stated age   Head:    Normocephalic, without obvious abnormality, atraumatic   Eyes:    PERRL, " conjunctivae/corneas clear, EOM's intact, both eyes   Ears:    Normal external ear canals, both ears   Nose:   Nares normal, septum midline, mucosa normal, no drainage    or sinus tenderness   Throat:   Lips, mucosa, and tongue normal   Neck:   Supple, symmetrical, trachea midline, no adenopathy;     thyroid:  no enlargement/tenderness/nodules; no carotid    bruit or JVD   Back:     Symmetric, no curvature, ROM normal, no CVA tenderness   Lungs:     Decreased breath sounds bilaterally, respirations unlabored   Chest Wall:    No tenderness or deformity    Heart:    Regular rate and rhythm, S1 and S2 normal, no murmur, rub   or gallop   Abdomen:     Soft, nontender, bowel sounds active all four quadrants,     no masses, no hepatomegaly, no splenomegaly   Extremities:   Extremities normal, atraumatic, no cyanosis or edema   Pulses:   2+ and symmetric all extremities   Skin:   Skin color, texture, turgor normal, no rashes or lesions               .    Data Review:  Labs in chart were reviewed.  WBC   Date Value Ref Range Status   08/18/2022 9.40 3.40 - 10.80 10*3/mm3 Final     Hemoglobin   Date Value Ref Range Status   08/18/2022 11.5 (L) 12.0 - 15.9 g/dL Final     Hematocrit   Date Value Ref Range Status   08/18/2022 36.4 34.0 - 46.6 % Final     Platelets   Date Value Ref Range Status   08/18/2022 284 140 - 450 10*3/mm3 Final     Sodium   Date Value Ref Range Status   08/18/2022 138 136 - 145 mmol/L Final     Potassium   Date Value Ref Range Status   08/18/2022 3.9 3.5 - 5.2 mmol/L Final     Chloride   Date Value Ref Range Status   08/18/2022 101 98 - 107 mmol/L Final     CO2   Date Value Ref Range Status   08/18/2022 19.5 (L) 22.0 - 29.0 mmol/L Final     BUN   Date Value Ref Range Status   08/18/2022 13 8 - 23 mg/dL Final     Creatinine   Date Value Ref Range Status   08/18/2022 0.84 0.57 - 1.00 mg/dL Final     Glucose   Date Value Ref Range Status   08/18/2022 177 (H) 65 - 99 mg/dL Final     Calcium   Date Value Ref  Range Status   08/18/2022 9.3 8.6 - 10.5 mg/dL Final     Magnesium   Date Value Ref Range Status   08/18/2022 1.8 1.6 - 2.4 mg/dL Final     AST (SGOT)   Date Value Ref Range Status   08/18/2022 38 (H) 1 - 32 U/L Final     ALT (SGPT)   Date Value Ref Range Status   08/18/2022 41 (H) 1 - 33 U/L Final     Alkaline Phosphatase   Date Value Ref Range Status   08/18/2022 112 39 - 117 U/L Final                Imaging Results (All)     Procedure Component Value Units Date/Time    XR Chest 1 View [[id number]] Collected: 08/18/22 1626     Updated: 08/18/22 1629    Narrative:      XR CHEST 1 VW-     HISTORY:  Weak, dizzy, altered mental status.     COMPARISON:  Chest radiograph 06/09/2022     FINDINGS:    A single portable view of the chest was obtained. The cardiac silhouette  is upper normal in size. There is calcific aortic atherosclerosis. There  are low lung volumes. There is no focal consolidation or effusion. There  is multilevel degenerative disc disease. There are surgical clips  projecting over the right upper quadrant.     This report was finalized on 8/18/2022 4:26 PM by Dr. Shahla Alcala M.D.               Assessment:  Active Hospital Problems    Diagnosis  POA   • Acute UTI [N39.0]  Yes      Resolved Hospital Problems   No resolved problems to display.   dementia  Hypertension  Bipolar disorder  Diabetes  Rheumatoid arthritis  obesity    Plan:  Will continue antibiotics  Ask psychiatry to help with agitation  accu checks, insulin sliding scale  Trend hgb  D.w patient, nurse in ED as well as ED provider    Anna Rodrigez MD  8/18/2022  20:09 EDT

## 2022-08-19 NOTE — CONSULTS
IDENTIFYING INFORMATION: The patient is a 66-year-old white female with a history of bipolar disorder and early dementia admitted for treatment of urinary tract infection on 8/18/2022.  She had exhibited some signs of confusion earlier during hospitalization.    CHIEF COMPLAINT: None given    INFORMANT: Patient and chart    RELIABILITY: Good    HISTORY OF PRESENT ILLNESS: The patient is a 58-year-old white female admitted for treatment of acute urinary tract infection on 8/18/2022.  Family had noted that the patient had seemed increasingly confused for the 3 to 5 days prior to hospitalization.  She has had some issues of agitation which have responded well to as needed haloperidol, but when seen today is pleasant cooperative and fully oriented.  The patient reports that she sees Dr. Rosales Gill and has been diagnosed with bipolar disorder as well as early dementia.  Her currently prescribed psychotropic medications include Depakote, Abilify, Aricept and Prozac.  She reports a history of prior psychiatric treatment at our [facility] several years ago but has not been admitted in some time.  The patient denies recent changes in sleep or appetite and is currently denying any psychotic symptoms.  She is pleasant cooperative during interview.    PAST PSYCHIATRIC HISTORY: As above    PAST MEDICAL HISTORY: Significant for diabetes mellitus, hypertension, rheumatoid arthritis, obesity and the aforementioned urinary tract infection    MEDICATIONS:   Current Facility-Administered Medications   Medication Dose Route Frequency Provider Last Rate Last Admin   • acetaminophen (TYLENOL) tablet 650 mg  650 mg Oral Q4H PRN Anna Rodrigez MD       • ARIPiprazole (ABILIFY) tablet 5 mg  5 mg Oral Daily Anna Rodrigez MD   5 mg at 08/19/22 0908   • cefTRIAXone (ROCEPHIN) 1 g in sodium chloride 0.9 % 100 mL IVPB-VTB  1 g Intravenous Q24H Anna Rodrigez MD       • dextrose (D50W) (25 g/50 mL) IV  injection 25 g  25 g Intravenous Q15 Min PRN Anna Rodrigez MD       • dextrose (GLUTOSE) oral gel 15 g  15 g Oral Q15 Min PRN Anna Rodrigez MD       • divalproex (DEPAKOTE ER) 24 hr tablet 250 mg  250 mg Oral Nightly Daniel Ramesh III, MD       • donepezil (ARICEPT) tablet 10 mg  10 mg Oral Nightly Anna Rodrigez MD   10 mg at 08/18/22 2232   • FLUoxetine (PROzac) capsule 10 mg  10 mg Oral Daily Anna Rodrigez MD   10 mg at 08/19/22 0908   • glucagon (human recombinant) (GLUCAGEN DIAGNOSTIC) injection 1 mg  1 mg Intramuscular Q15 Min PRN Anna Rodrigez MD       • insulin lispro (ADMELOG) injection 0-9 Units  0-9 Units Subcutaneous TID With Meals Anna Rodrigez MD       • losartan (COZAAR) tablet 25 mg  25 mg Oral Daily Anna Rodrigez MD   25 mg at 08/19/22 0908   • melatonin tablet 3 mg  3 mg Oral Nightly PRN Anna Rodrigez MD   3 mg at 08/18/22 2232   • nitroglycerin (NITROSTAT) SL tablet 0.4 mg  0.4 mg Sublingual Q5 Min PRN Anna Rodrigez MD       • ondansetron (ZOFRAN) tablet 4 mg  4 mg Oral Q6H PRN Anna Rodrigez MD        Or   • ondansetron (ZOFRAN) injection 4 mg  4 mg Intravenous Q6H PRN Anna Rodrigez MD       • pantoprazole (PROTONIX) EC tablet 40 mg  40 mg Oral Daily Anna Rodrigez MD   40 mg at 08/19/22 0908   • sodium chloride 0.9 % flush 10 mL  10 mL Intravenous PRN Juan Miguel Montano MD   10 mL at 08/18/22 1643   • tiotropium (SPIRIVA RESPIMAT) 2.5 mcg/act aerosol solution inhaler  1 puff Inhalation Daily Anna Rodrigez MD   1 puff at 08/19/22 0900         ALLERGIES: The patient lists 7 medical allergies which can be obtained from the chart    FAMILY HISTORY: Noncontributory    SOCIAL HISTORY: No reported use of alcohol, tobacco, or street drugs    MENTAL STATUS EXAM: The patient is an obese white female appearing her stated age.  She has no apparent physical distress at the time of  "examination.  She is awake alert and oriented x3.  Her mood is euthymic her affect congruent.  Speech is relevant and coherent.  There are no gross deficits in memory or cognition noted though the patient does not comply with formal testing of memory.  She denies suicidal or homicidal ideation or psychotic features.  Judgment and insight appear to be reasonably intact.    ASSETS/LIABILITIES: To be assessed    DIAGNOSTIC IMPRESSION: Metabolic encephalopathy secondary to urinary tract infection, dementia unspecified by history, bipolar disorder in partial remission, medical problems described previously    PLAN: During today's interview, the patient appears lucid, pleasant and cooperative.  Unfortunately, if her symptoms are encephalopathic related to her urinary tract infection I may have simply caught her during a \"lucid period\".  I would not at this point make any changes in the patient's previously prescribed outpatient psychotropic medication regimen, but will leave orders for as needed intramuscular haloperidol as needed for agitation.  The patient has no further episodes of agitation or confusion in the next 24 hours, she is, from a psychiatric standpoint, okay for discharge home.  "

## 2022-08-19 NOTE — PAYOR COMM NOTE
"Ericka Rankin (66 y.o. Female)     PLEASE SEE ATTACHED FOR INPT AUTH     CALL ERIC @ [phone] OR [phone]    THANKS ERIC SANCHEZ RN/CCP             Date of Birth   1955    Social Security Number       Address   [address]  [place] [id number]    Home Phone   [phone]    MRN   [id number]       [employer]   [employer]    Marital Status                               Admission Date   8/18/22    Admission Type   Emergency    Admitting Provider   Anna Rodrigez MD    Attending Provider   Randall Brooks MD    Department, Room/Bed   [address], S522/1       Discharge Date       Discharge Disposition       Discharge Destination                               Attending Provider: Randall Brooks MD    Allergies: Levofloxacin, Sulfamethoxazole, Amoxicillin, Benadryl [Diphenhydramine], Ceclor [Cefaclor], Penicillins, Zyprexa [Olanzapine]    Isolation: None   Infection: None   Code Status: CPR   Advance Care Planning Activity    Ht: 167.6 cm (66\")   Wt: 99.5 kg (219 lb 5.7 oz)    Admission Cmt: None   Principal Problem: Acute UTI [N39.0]                 Active Insurance as of 8/18/2022     Primary Coverage     Payor Plan Insurance Group Employer/Plan Group    [facility] MEDICARE REPLACEMENT [employer] MEDICARE REPLACEMENT      Payor Plan Address Payor Plan Phone Number Payor Plan Fax Number Effective Dates    [address] [address] [phone]  6/2/2022 - None Entered    [facility] [address]       Subscriber Name Subscriber Birth Date Member ID       ERICKA RANKIN 1955 [id number]           Secondary Coverage     Payor Plan Insurance Group Employer/Plan Group    [place]      Payor Plan Address Payor Plan Phone Number Payor Plan Fax Number Effective Dates    [address] [phone]  8/31/2021 - None Entered    [place] [address]       Subscriber Name Subscriber Birth Date Member ID       " ERICKA RANKIN 1955 [id number]                 Emergency Contacts      (Rel.) Home Phone Work Phone Mobile Phone    Stone Rankin (Spouse) [phone] -- [phone]            [place]: BAKARI [id number]  Tax ID [id number]     History & Physical      Stingl, Anna Bernabe MD at 22          HISTORY AND PHYSICAL   [facility]        Date of Admission: 2022  Patient Identification:  Name: Ericka Rankin  Age: 66 y.o.  Sex: female  :  1955  MRN: [id number]                     Primary Care Physician: Akosua Staley APRN    Chief Complaint:  66 year old female who presented to the emergency room with confusion; she has a history of dementia and bipolar disorder; she has been yelling and acting out which is not normal for her according to family; she tells me that she needs to go home but can be redirected; her  was not present; she is not able to give a review of systems    History of Present Illness:   As above    Past Medical History:  Past Medical History:   Diagnosis Date   • Alzheimer disease (HCC)    • Anxiety    • Bipolar 1 disorder (HCC)    • Dementia (HCC)    • Depression    • Diabetes mellitus (HCC)    • GERD (gastroesophageal reflux disease)    • OCD (obsessive compulsive disorder)    • Rheumatoid arthritis (HCC)    • UTI (urinary tract infection)      Past Surgical History:  Past Surgical History:   Procedure Laterality Date   • CHOLECYSTECTOMY     • HYSTERECTOMY        Home Meds:  Medications Prior to Admission   Medication Sig Dispense Refill Last Dose   • ARIPiprazole (ABILIFY) 5 MG tablet Take 1 tablet by mouth Daily. 30 tablet 1    • donepezil (ARICEPT) 10 MG tablet Take 1 tablet by mouth Every Night. 30 tablet 1    • FLUoxetine (PROzac) 10 MG capsule Take 1 capsule by mouth Daily. 30 capsule 1    • hydrochlorothiazide (HYDRODIURIL) 25 MG tablet Take 25 mg by mouth Daily.      • losartan (COZAAR) 25 MG tablet Take 25 mg by  mouth Daily.      • meclizine (ANTIVERT) 25 MG tablet Take 1 tablet by mouth 4 (Four) Times a Day As Needed for Dizziness. 20 tablet 0    • metFORMIN (GLUCOPHAGE) 500 MG tablet Take 1 tablet by mouth Daily With Breakfast. 30 tablet 0    • ondansetron ODT (Zofran ODT) 4 MG disintegrating tablet Place 1 tablet on the tongue Every 8 (Eight) Hours As Needed for Nausea. 12 tablet 0    • pantoprazole (PROTONIX) 40 MG EC tablet Take 1 tablet by mouth Daily. Indications: Gastroesophageal Reflux Disease 30 tablet 0    • polyethylene glycol (MIRALAX) 17 g packet Take 17 g by mouth Daily. 10 each 0    • sucralfate (CARAFATE) 1 g tablet Take 1 tablet by mouth 4 (Four) Times a Day. 20 tablet 0    • Tiotropium Bromide Monohydrate (SPIRIVA RESPIMAT) 1.25 MCG/ACT aerosol solution inhaler Inhale 2 puffs Daily.          Allergies:  Allergies   Allergen Reactions   • Levofloxacin Anaphylaxis   • Sulfamethoxazole Anaphylaxis   • Amoxicillin Rash   • Benadryl [Diphenhydramine] Rash   • Ceclor [Cefaclor] Rash   • Penicillins Rash     unknown   • Zyprexa [Olanzapine] Rash     Immunizations:  Immunization History   Administered Date(s) Administered   • COVID-19 (PFIZER) PURPLE CAP 2021, 2021     Social History:   Social History     Social History Narrative   • Not on file     Social History     Socioeconomic History   • Marital status:    Tobacco Use   • Smoking status: Former Smoker   Substance and Sexual Activity   • Alcohol use: Never       Family History:  Family History   Problem Relation Age of Onset   • Cancer Father         unknown        Review of Systems  Not obtainable  Objective:  T Max 24 hrs: Temp (24hrs), Av.3 °F (36.8 °C), Min:98.1 °F (36.7 °C), Max:98.4 °F (36.9 °C)    Vitals Ranges:   Temp:  [98.1 °F (36.7 °C)-98.4 °F (36.9 °C)] 98.4 °F (36.9 °C)  Heart Rate:  [] 97  Resp:  [16-18] 18  BP: ()/(41-75) 125/75      Exam:  /75 (BP Location: Left arm, Patient Position: Lying)   Pulse  "97   Temp 98.4 °F (36.9 °C) (Oral)   Resp 18   Ht 167.6 cm (66\")   Wt 111 kg (245 lb)   LMP 07/11/2018 Comment: postmenopausal  SpO2 94%   BMI 39.54 kg/m²     General Appearance:    Alert, chronically ill appearing appears stated age   Head:    Normocephalic, without obvious abnormality, atraumatic   Eyes:    PERRL, conjunctivae/corneas clear, EOM's intact, both eyes   Ears:    Normal external ear canals, both ears   Nose:   Nares normal, septum midline, mucosa normal, no drainage    or sinus tenderness   Throat:   Lips, mucosa, and tongue normal   Neck:   Supple, symmetrical, trachea midline, no adenopathy;     thyroid:  no enlargement/tenderness/nodules; no carotid    bruit or JVD   Back:     Symmetric, no curvature, ROM normal, no CVA tenderness   Lungs:     Decreased breath sounds bilaterally, respirations unlabored   Chest Wall:    No tenderness or deformity    Heart:    Regular rate and rhythm, S1 and S2 normal, no murmur, rub   or gallop   Abdomen:     Soft, nontender, bowel sounds active all four quadrants,     no masses, no hepatomegaly, no splenomegaly   Extremities:   Extremities normal, atraumatic, no cyanosis or edema   Pulses:   2+ and symmetric all extremities   Skin:   Skin color, texture, turgor normal, no rashes or lesions               .    Data Review:  Labs in chart were reviewed.  WBC   Date Value Ref Range Status   08/18/2022 9.40 3.40 - 10.80 10*3/mm3 Final     Hemoglobin   Date Value Ref Range Status   08/18/2022 11.5 (L) 12.0 - 15.9 g/dL Final     Hematocrit   Date Value Ref Range Status   08/18/2022 36.4 34.0 - 46.6 % Final     Platelets   Date Value Ref Range Status   08/18/2022 284 140 - 450 10*3/mm3 Final     Sodium   Date Value Ref Range Status   08/18/2022 138 136 - 145 mmol/L Final     Potassium   Date Value Ref Range Status   08/18/2022 3.9 3.5 - 5.2 mmol/L Final     Chloride   Date Value Ref Range Status   08/18/2022 101 98 - 107 mmol/L Final     CO2   Date Value Ref Range " Status   08/18/2022 19.5 (L) 22.0 - 29.0 mmol/L Final     BUN   Date Value Ref Range Status   08/18/2022 13 8 - 23 mg/dL Final     Creatinine   Date Value Ref Range Status   08/18/2022 0.84 0.57 - 1.00 mg/dL Final     Glucose   Date Value Ref Range Status   08/18/2022 177 (H) 65 - 99 mg/dL Final     Calcium   Date Value Ref Range Status   08/18/2022 9.3 8.6 - 10.5 mg/dL Final     Magnesium   Date Value Ref Range Status   08/18/2022 1.8 1.6 - 2.4 mg/dL Final     AST (SGOT)   Date Value Ref Range Status   08/18/2022 38 (H) 1 - 32 U/L Final     ALT (SGPT)   Date Value Ref Range Status   08/18/2022 41 (H) 1 - 33 U/L Final     Alkaline Phosphatase   Date Value Ref Range Status   08/18/2022 112 39 - 117 U/L Final                Imaging Results (All)     Procedure Component Value Units Date/Time    XR Chest 1 View [[id number]] Collected: 08/18/22 1626     Updated: 08/18/22 1629    Narrative:      XR CHEST 1 VW-     HISTORY:  Weak, dizzy, altered mental status.     COMPARISON:  Chest radiograph 06/09/2022     FINDINGS:    A single portable view of the chest was obtained. The cardiac silhouette  is upper normal in size. There is calcific aortic atherosclerosis. There  are low lung volumes. There is no focal consolidation or effusion. There  is multilevel degenerative disc disease. There are surgical clips  projecting over the right upper quadrant.     This report was finalized on 8/18/2022 4:26 PM by Dr. Shahla Alcala M.D.               Assessment:  Active Hospital Problems    Diagnosis  POA   • Acute UTI [N39.0]  Yes      Resolved Hospital Problems   No resolved problems to display.   dementia  Hypertension  Bipolar disorder  Diabetes  Rheumatoid arthritis  obesity    Plan:  Will continue antibiotics  Ask psychiatry to help with agitation  accu checks, insulin sliding scale  Trend hgb  D.w patient, nurse in ED as well as ED provider    Anna Rodrigez MD  8/18/2022  20:09 EDT      Electronically signed by Rain  Anna Bernabe MD at 08/18/22 2016          Emergency Department Notes      Kelsey Cantor, RN at 08/18/22 1551        Pt is hallucinating per family. Pt is more confused than normal for about 3-5 days. Pt sent by PCP for possible UTI. Pt hx of dementia.     Electronically signed by Kelsey Cantor RN at 08/18/22 1553     Hermes Denise PA at 08/18/22 1639     Attestation signed by Juan Miguel Montano MD at 08/18/22 2109      Pt presents to the ED c/o 1 day of acute on chronic altered mental status.  History of dementia, bipolar disorder, has been more lethargic and fatigued per .  This afternoon started becoming more agitated and acting out.  Patient unable to write any significant review of systems at this time.     On exam,   General: No acute distress, nontoxic, nondiaphoretic  HEENT: Mucous membranes tacky, atraumatic, EOMI  Neck: Full ROM, trachea midline  Pulm: Symmetric chest rise, nonlabored, lungs CTAB  Cardiovascular: Regular rate and rhythm, intact distal pulses  GI: Soft, nontender, nondistended, no rebound, no guarding, bowel sounds present  MSK: Full ROM, no deformity  Skin: Warm, dry  Neuro: Awake, alert, moving all extremities, no focal deficits  Psych: Agitated, intermittently cooperative      N95, protective eye goggles, and gloves used during this encounter. Patient in surgical mask.      Plan:   ED Course as of 08/18/22 2108   Thu Aug 18, 2022   1635 Patient presents with 1 day history of acute on chronic confusion.  Differential diagnoses include not limited to psychosis, sepsis, encephalopathic. [EE]   1645 Chest x-ray interpreted myself shows no acute infiltrate. [EE]   1702 WBC: 9.40 [EE]   1702 Hemoglobin(!): 11.5 [EE]   1710 Patient with acute UTI and altered mental status.  Plan to admit the patient.  We will have access evaluate as well for underlying bipolar disease and depression. [EE]   1724 I discussed the case with HIREN Bravo.  She agrees to admit. [EE]      ED Course User  Index  [EE] Hermes Denise PA     Patient with a UTI, plan for hospitalization for acute encephalopathy, IV antibiotics ordered.       SHARED APC FACE TO FACE: This visit was performed by both the physician and an APC. I personally evaluated and examined the patient. I performed the entirety of the physical exam and I documented this exam in this attestation or in accompanying documentation.    Juan Miguel Montano MD 8/18/2022 21:08 EDT                         EMERGENCY DEPARTMENT ENCOUNTER    Room Number:  S522/1  Date of encounter:  8/18/2022  PCP: Akosua Staley APRN  Historian:       I used full protective equipment while examining this patient.  This includes face mask, gloves and protective eyewear.  I washed my hands before entering the room and immediately upon leaving the room      HPI:  Chief Complaint: Altered mental status  A complete HPI/ROS/PMH/PSH/SH/FH are unobtainable due to: Altered mental status    Context: Krupa Mcmanus is a 66 y.o. female who presents to the ED c/o 1 day history of acute on chronic altered mental status.  According to the  the patient does have a history of bipolar disorder, dementia, anxiety, depression.  Has been states over the past several days the patient has been more lethargic and fatigued.  This afternoon the patient started acting out and screaming.  This is abnormal for the patient according to the .  He denies any drug or alcohol use.    Review of Medical Records  Reviewed patient's last ER visit from 7/1/2022.  Patient seen for UTI, lactic acidosis.    PAST MEDICAL HISTORY  Active Ambulatory Problems     Diagnosis Date Noted   • Affective psychosis, bipolar (HCC) 07/11/2018   • Essential hypertension 07/12/2018   • Mild intermittent asthma without complication 07/12/2018   • Hypokalemia 07/12/2018   • Leukocytosis 07/12/2018   • Elevated liver enzymes 07/12/2018   • Type 2 diabetes mellitus (HCC) 07/12/2018     Resolved Ambulatory Problems      Diagnosis Date Noted   • No Resolved Ambulatory Problems     Past Medical History:   Diagnosis Date   • Alzheimer disease (HCC)    • Anxiety    • Bipolar 1 disorder (HCC)    • Dementia (HCC)    • Depression    • Diabetes mellitus (HCC)    • GERD (gastroesophageal reflux disease)    • OCD (obsessive compulsive disorder)    • Rheumatoid arthritis (HCC)    • UTI (urinary tract infection)          PAST SURGICAL HISTORY  Past Surgical History:   Procedure Laterality Date   • CHOLECYSTECTOMY     • HYSTERECTOMY           FAMILY HISTORY  Family History   Problem Relation Age of Onset   • Cancer Father         unknown         SOCIAL HISTORY  Social History     Socioeconomic History   • Marital status:    Tobacco Use   • Smoking status: Former Smoker   Substance and Sexual Activity   • Alcohol use: Never         ALLERGIES  Levofloxacin, Sulfamethoxazole, Amoxicillin, Benadryl [diphenhydramine], Ceclor [cefaclor], Penicillins, and Zyprexa [olanzapine]        REVIEW OF SYSTEMS  Unobtainable secondary to altered mental status      PHYSICAL EXAM    I have reviewed the triage vital signs and nursing notes.    ED Triage Vitals   Temp Heart Rate Resp BP SpO2   08/18/22 1554 08/18/22 1553 08/18/22 1556 -- 08/18/22 1553   98.1 °F (36.7 °C) 109 16  97 %      Temp src Heart Rate Source Patient Position BP Location FiO2 (%)   08/18/22 1554 -- -- -- --   Tympanic           Physical Exam  GENERAL: Alert, agitated, screaming, moderate distress   HENT: head atraumatic, no nuchal rigidity  EYES: no scleral icterus, EOMI  CV: regular rhythm, regular rate, no murmur  RESPIRATORY: normal effort, CTA  ABDOMEN: soft, nontender  MUSCULOSKELETAL: no deformity, FROM, no calf swelling or tenderness  NEURO: alert, oriented to self, pressured speech, no dysarthria   SKIN: warm, dry        LAB RESULTS  Recent Results (from the past 24 hour(s))   Comprehensive Metabolic Panel    Collection Time: 08/18/22  4:37 PM    Specimen: Blood   Result Value  Ref Range    Glucose 177 (H) 65 - 99 mg/dL    BUN 13 8 - 23 mg/dL    Creatinine 0.84 0.57 - 1.00 mg/dL    Sodium 138 136 - 145 mmol/L    Potassium 3.9 3.5 - 5.2 mmol/L    Chloride 101 98 - 107 mmol/L    CO2 19.5 (L) 22.0 - 29.0 mmol/L    Calcium 9.3 8.6 - 10.5 mg/dL    Total Protein 6.7 6.0 - 8.5 g/dL    Albumin 4.00 3.50 - 5.20 g/dL    ALT (SGPT) 41 (H) 1 - 33 U/L    AST (SGOT) 38 (H) 1 - 32 U/L    Alkaline Phosphatase 112 39 - 117 U/L    Total Bilirubin 0.5 0.0 - 1.2 mg/dL    Globulin 2.7 gm/dL    A/G Ratio 1.5 g/dL    BUN/Creatinine Ratio 15.5 7.0 - 25.0    Anion Gap 17.5 (H) 5.0 - 15.0 mmol/L    eGFR 75.8 >60.0 mL/min/1.73   Troponin    Collection Time: 08/18/22  4:37 PM    Specimen: Blood   Result Value Ref Range    Troponin T <0.010 0.000 - 0.030 ng/mL   Magnesium    Collection Time: 08/18/22  4:37 PM    Specimen: Blood   Result Value Ref Range    Magnesium 1.8 1.6 - 2.4 mg/dL   Urinalysis With Culture If Indicated - Urine, Clean Catch    Collection Time: 08/18/22  4:37 PM    Specimen: Urine, Clean Catch   Result Value Ref Range    Color, UA Yellow Yellow, Straw    Appearance, UA Cloudy (A) Clear    pH, UA 6.5 5.0 - 8.0    Specific Gravity, UA 1.014 1.005 - 1.030    Glucose, UA Negative Negative    Ketones, UA Negative Negative    Bilirubin, UA Negative Negative    Blood, UA Negative Negative    Protein, UA Negative Negative    Leuk Esterase, UA Moderate (2+) (A) Negative    Nitrite, UA Positive (A) Negative    Urobilinogen, UA 1.0 E.U./dL 0.2 - 1.0 E.U./dL   Green Top (Gel)    Collection Time: 08/18/22  4:37 PM   Result Value Ref Range    Extra Tube Hold for add-ons.    Lavender Top    Collection Time: 08/18/22  4:37 PM   Result Value Ref Range    Extra Tube hold for add-on    Gold Top - SST    Collection Time: 08/18/22  4:37 PM   Result Value Ref Range    Extra Tube Hold for add-ons.    Light Blue Top    Collection Time: 08/18/22  4:37 PM   Result Value Ref Range    Extra Tube Hold for add-ons.    CBC Auto  Differential    Collection Time: 08/18/22  4:37 PM    Specimen: Blood   Result Value Ref Range    WBC 9.40 3.40 - 10.80 10*3/mm3    RBC 4.49 3.77 - 5.28 10*6/mm3    Hemoglobin 11.5 (L) 12.0 - 15.9 g/dL    Hematocrit 36.4 34.0 - 46.6 %    MCV 81.1 79.0 - 97.0 fL    MCH 25.6 (L) 26.6 - 33.0 pg    MCHC 31.6 31.5 - 35.7 g/dL    RDW 16.1 (H) 12.3 - 15.4 %    RDW-SD 46.6 37.0 - 54.0 fl    MPV 10.3 6.0 - 12.0 fL    Platelets 284 140 - 450 10*3/mm3    Neutrophil % 75.7 42.7 - 76.0 %    Lymphocyte % 17.0 (L) 19.6 - 45.3 %    Monocyte % 5.5 5.0 - 12.0 %    Eosinophil % 1.0 0.3 - 6.2 %    Basophil % 0.5 0.0 - 1.5 %    Immature Grans % 0.3 0.0 - 0.5 %    Neutrophils, Absolute 7.11 (H) 1.70 - 7.00 10*3/mm3    Lymphocytes, Absolute 1.60 0.70 - 3.10 10*3/mm3    Monocytes, Absolute 0.52 0.10 - 0.90 10*3/mm3    Eosinophils, Absolute 0.09 0.00 - 0.40 10*3/mm3    Basophils, Absolute 0.05 0.00 - 0.20 10*3/mm3    Immature Grans, Absolute 0.03 0.00 - 0.05 10*3/mm3    nRBC 0.0 0.0 - 0.2 /100 WBC   Ethanol    Collection Time: 08/18/22  4:37 PM    Specimen: Blood   Result Value Ref Range    Ethanol <10 0 - 10 mg/dL    Ethanol % <0.010 %   Urine Drug Screen - Urine, Clean Catch    Collection Time: 08/18/22  4:37 PM    Specimen: Urine, Clean Catch   Result Value Ref Range    Amphet/Methamphet, Screen Negative Negative    Barbiturates Screen, Urine Negative Negative    Benzodiazepine Screen, Urine Negative Negative    Cocaine Screen, Urine Negative Negative    Opiate Screen Negative Negative    THC, Screen, Urine Negative Negative    Methadone Screen, Urine Negative Negative    Oxycodone Screen, Urine Negative Negative   Urinalysis, Microscopic Only - Urine, Clean Catch    Collection Time: 08/18/22  4:37 PM    Specimen: Urine, Clean Catch   Result Value Ref Range    RBC, UA 0-2 None Seen, 0-2 /HPF    WBC, UA Too Numerous to Count (A) None Seen, 0-2 /HPF    Bacteria, UA 4+ (A) None Seen /HPF    Squamous Epithelial Cells, UA 0-2 None Seen, 0-2  /HPF    Hyaline Casts, UA 7-12 None Seen /LPF    Methodology Automated Microscopy    ECG 12 Lead    Collection Time: 08/18/22  4:49 PM   Result Value Ref Range    QT Interval 358 ms   COVID-19,[facility] IN-HOUSE CEPHEID/MERCEDES NP SWAB IN TRANSPORT MEDIA 8-12 HR TAT - Swab, Nasopharynx    Collection Time: 08/18/22  5:36 PM    Specimen: Nasopharynx; Swab   Result Value Ref Range    COVID19 Not Detected Not Detected - Ref. Range   Lactic Acid, Plasma    Collection Time: 08/18/22  6:07 PM    Specimen: Blood   Result Value Ref Range    Lactate 4.3 (C) 0.5 - 2.0 mmol/L       Ordered the above labs and independently reviewed the results.        RADIOLOGY  XR Chest 1 View    Result Date: 8/18/2022  XR CHEST 1 VW-  HISTORY:  Weak, dizzy, altered mental status.  COMPARISON:  Chest radiograph 06/09/2022  FINDINGS:  A single portable view of the chest was obtained. The cardiac silhouette is upper normal in size. There is calcific aortic atherosclerosis. There are low lung volumes. There is no focal consolidation or effusion. There is multilevel degenerative disc disease. There are surgical clips projecting over the right upper quadrant.  This report was finalized on 8/18/2022 4:26 PM by Dr. Shahla Alcala M.D.        I ordered the above noted radiological studies. Reviewed by me and discussed with radiologist.  See dictation for official radiology interpretation.      MEDICATIONS GIVEN IN ER    Medications   sodium chloride 0.9 % flush 10 mL (10 mL Intravenous Given 8/18/22 1643)   sodium chloride 0.9 % bolus 1,000 mL (has no administration in time range)   nitroglycerin (NITROSTAT) SL tablet 0.4 mg (has no administration in time range)   acetaminophen (TYLENOL) tablet 650 mg (has no administration in time range)   ondansetron (ZOFRAN) tablet 4 mg (has no administration in time range)     Or   ondansetron (ZOFRAN) injection 4 mg (has no administration in time range)   melatonin tablet 3 mg (has no administration in time range)    cefTRIAXone (ROCEPHIN) 1 g in sodium chloride 0.9 % 100 mL IVPB-VTB (has no administration in time range)   ARIPiprazole (ABILIFY) tablet 5 mg (has no administration in time range)   donepezil (ARICEPT) tablet 10 mg (has no administration in time range)   FLUoxetine (PROzac) capsule 10 mg (has no administration in time range)   losartan (COZAAR) tablet 25 mg (has no administration in time range)   pantoprazole (PROTONIX) EC tablet 40 mg (has no administration in time range)   tiotropium (SPIRIVA RESPIMAT) 2.5 mcg/act aerosol solution inhaler (has no administration in time range)   dextrose (GLUTOSE) oral gel 15 g (has no administration in time range)   dextrose (D50W) (25 g/50 mL) IV injection 25 g (has no administration in time range)   glucagon (human recombinant) (GLUCAGEN DIAGNOSTIC) injection 1 mg (has no administration in time range)   insulin lispro (ADMELOG) injection 0-9 Units (has no administration in time range)   droperidol (INAPSINE) injection 5 mg (5 mg Intravenous Given 8/18/22 1642)   cefTRIAXone (ROCEPHIN) 1 g in sodium chloride 0.9 % 100 mL IVPB-VTB (0 g Intravenous Stopped 8/18/22 1826)   droperidol (INAPSINE) injection 5 mg (5 mg Intravenous Given 8/18/22 1831)         PROGRESS, DATA ANALYSIS, CONSULTS, AND MEDICAL DECISION MAKING    All labs have been independently reviewed by me.  All radiology studies have been reviewed by me and discussed with radiologist dictating the report.   EKG's independently viewed and interpreted by me.  Discussion below represents my analysis of pertinent findings related to patient's condition, differential diagnosis, treatment plan and final disposition.    I have discussed case with Dr. Montano, emergency room physician.  He has performed his own bedside examination and agrees with treatment plan.    ED Course as of 08/18/22 2106   Thu Aug 18, 2022   1635 Patient presents with 1 day history of acute on chronic confusion.  Differential diagnoses include not limited  to psychosis, sepsis, encephalopathic. [EE]   1645 Chest x-ray interpreted myself shows no acute infiltrate. [EE]   1702 WBC: 9.40 [EE]   1702 Hemoglobin(!): 11.5 [EE]   1710 Patient with acute UTI and altered mental status.  Plan to admit the patient.  We will have access evaluate as well for underlying bipolar disease and depression. [EE]   1724 I discussed the case with HIREN Bravo.  She agrees to admit. [EE]      ED Course User Index  [EE] Hermes Denise PA       AS OF 21:06 EDT VITALS:    BP - 125/75  HR - 97  TEMP - 98.4 °F (36.9 °C) (Oral)  O2 SATS - 94%        DIAGNOSIS  Final diagnoses:   Acute UTI   Encephalopathy   Dementia with behavioral disturbance, unspecified dementia type (HCC)         DISPOSITION  Admitted      Dictated utilizing Dragon dictation     Hermes Denise PA  08/18/22 2106      Electronically signed by Juan Miguel Montano MD at 08/18/22 2109     Irasema Guerra RN at 08/18/22 1837          Nursing report ED to floor  Krupa Mcmanus  66 y.o.  female    HPI :   Chief Complaint   Patient presents with   • Altered Mental Status       Admitting doctor:   Anna Rodrigez MD    Admitting diagnosis:   The primary encounter diagnosis was Acute UTI. Diagnoses of Encephalopathy and Dementia with behavioral disturbance, unspecified dementia type (HCC) were also pertinent to this visit.    Code status:   Current Code Status     Date Active Code Status Order ID Comments User Context       8/18/2022 1747 CPR (Attempt to Resuscitate) [id number]  Anna Rodrigez MD ED     Advance Care Planning Activity      Questions for Current Code Status     Question Answer    Code Status (Patient has no pulse and is not breathing) CPR (Attempt to Resuscitate)    Medical Interventions (Patient has pulse or is breathing) Full          Allergies:   Levofloxacin, Sulfamethoxazole, Amoxicillin, Benadryl [diphenhydramine], Ceclor [cefaclor], Penicillins, and Zyprexa [olanzapine]    Intake and  "Output    Intake/Output Summary (Last 24 hours) at 8/18/2022 1837  Last data filed at 8/18/2022 1826  Gross per 24 hour   Intake 100 ml   Output --   Net 100 ml       Weight:       08/18/22  1809   Weight: 111 kg (245 lb)       Most recent vitals:   Vitals:    08/18/22 1738 08/18/22 1746 08/18/22 1809 08/18/22 1834   BP: 94/43   117/63   Pulse:  86  88   Resp:   18 18   Temp:   98.4 °F (36.9 °C)    TempSrc:   Oral    SpO2:  100% 96% 99%   Weight:   111 kg (245 lb)    Height:   167.6 cm (66\")        Active LDAs/IV Access:   Lines, Drains & Airways     Active LDAs     Name Placement date Placement time Site Days    Peripheral IV 08/18/22 1642 Right Wrist 08/18/22  1642  Wrist  less than 1                Labs (abnormal labs have a star):   Labs Reviewed   COMPREHENSIVE METABOLIC PANEL - Abnormal; Notable for the following components:       Result Value    Glucose 177 (*)     CO2 19.5 (*)     ALT (SGPT) 41 (*)     AST (SGOT) 38 (*)     Anion Gap 17.5 (*)     All other components within normal limits    Narrative:     GFR Normal >60  Chronic Kidney Disease <60  Kidney Failure <15     URINALYSIS W/ CULTURE IF INDICATED - Abnormal; Notable for the following components:    Appearance, UA Cloudy (*)     Leuk Esterase, UA Moderate (2+) (*)     Nitrite, UA Positive (*)     All other components within normal limits    Narrative:     In absence of clinical symptoms, the presence of pyuria, bacteria, and/or nitrites on the urinalysis result does not correlate with infection.   CBC WITH AUTO DIFFERENTIAL - Abnormal; Notable for the following components:    Hemoglobin 11.5 (*)     MCH 25.6 (*)     RDW 16.1 (*)     Lymphocyte % 17.0 (*)     Neutrophils, Absolute 7.11 (*)     All other components within normal limits   URINALYSIS, MICROSCOPIC ONLY - Abnormal; Notable for the following components:    WBC, UA Too Numerous to Count (*)     Bacteria, UA 4+ (*)     All other components within normal limits   COVID-19, QASIM IN-HOUSE " CEPHEID/MERCEDES, NP SWAB IN TRANSPORT MEDIA 8-12 HR TAT - Normal    Narrative:     Fact sheet for providers: https://www.fda.gov/media/859177/download     Fact sheet for patients: https://www.fda.gov/media/748824/download   TROPONIN (IN-HOUSE) - Normal    Narrative:     Troponin T Reference Range:  <= 0.03 ng/mL-   Negative for AMI  >0.03 ng/mL-     Abnormal for myocardial necrosis.  Clinicians would have to utilize clinical acumen, EKG, Troponin and serial changes to determine if it is an Acute Myocardial Infarction or myocardial injury due to an underlying chronic condition.       Results may be falsely decreased if patient taking Biotin.     MAGNESIUM - Normal   COVID PRE-OP / PRE-PROCEDURE SCREENING ORDER (NO ISOLATION)    Narrative:     The following orders were created for panel order COVID PRE-OP / PRE-PROCEDURE SCREENING ORDER (NO ISOLATION) - Swab, Nasopharynx.  Procedure                               Abnormality         Status                     ---------                               -----------         ------                     COVID-19,[facility]ASIM IN-HOUSE...[[id number]]  Normal              Final result                 Please view results for these tests on the individual orders.   URINE CULTURE   RAINBOW DRAW    Narrative:     The following orders were created for panel order [place] Draw.  Procedure                               Abnormality         Status                     ---------                               -----------         ------                     Green Top (Gel)[[id number]]                                  Final result               Lavender Top[[id number]]                                     Final result               Gold Top - SST[[id number]]                                   Final result               Light Blue Top[[id number]]                                   Final result                 Please view results for these tests on the individual orders.   ETHANOL   URINE DRUG SCREEN   LACTIC  ACID, PLASMA   POCT GLUCOSE FINGERSTICK   CBC AND DIFFERENTIAL    Narrative:     The following orders were created for panel order CBC & Differential.  Procedure                               Abnormality         Status                     ---------                               -----------         ------                     CBC Auto Differential[[id number]]        Abnormal            Final result                 Please view results for these tests on the individual orders.   GREEN TOP   LAVENDER TOP   GOLD TOP - SST   LIGHT BLUE TOP       EKG:   ECG 12 Lead   Final Result   HEART RATE= 94  bpm   RR Interval= 638  ms   OR Interval= 206  ms   P Horizontal Axis= -31  deg   P Front Axis= 31  deg   QRSD Interval= 93  ms   QT Interval= 358  ms   QRS Axis= 23  deg   T Wave Axis= 90  deg   - ABNORMAL ECG -   Sinus rhythm   Posterior infarct, old   Nonspecific T abnormalities, lateral leads   No change from previous tracing   Electronically Signed By: Lu Ventura ([facility]) 18-Aug-2022 17:36:45   Date and Time of Study: 2022-08-18 16:49:53          Meds given in ED:   Medications   sodium chloride 0.9 % flush 10 mL (10 mL Intravenous Given 8/18/22 1643)   sodium chloride 0.9 % bolus 1,000 mL (has no administration in time range)   nitroglycerin (NITROSTAT) SL tablet 0.4 mg (has no administration in time range)   acetaminophen (TYLENOL) tablet 650 mg (has no administration in time range)   ondansetron (ZOFRAN) tablet 4 mg (has no administration in time range)     Or   ondansetron (ZOFRAN) injection 4 mg (has no administration in time range)   melatonin tablet 3 mg (has no administration in time range)   droperidol (INAPSINE) injection 5 mg (5 mg Intravenous Given 8/18/22 1642)   cefTRIAXone (ROCEPHIN) 1 g in sodium chloride 0.9 % 100 mL IVPB-VTB (0 g Intravenous Stopped 8/18/22 1826)   droperidol (INAPSINE) injection 5 mg (5 mg Intravenous Given 8/18/22 1831)       Imaging results:  No radiology results for the last  day    Ambulatory status:   -       Social issues:   Social History     Socioeconomic History   • Marital status:    Tobacco Use   • Smoking status: Former Smoker   Substance and Sexual Activity   • Alcohol use: Never       NIH Stroke Scale:        Nursing report ED to floor:      Electronically signed by Irasema Guerra RN at 08/18/22 8453

## 2022-08-19 NOTE — CONSULTS
"DATA: Access Center consult due to psychosis; pt admitted to [facility] Rm 522 d/t acute UTI. Per EMR, pt's confusion worsening over the last 3-5 days; history of leaving AMA from [place] on 7/1- pt's  states pt became \"tired of waiting... wanting to go home\". This writer reviewed chart, spoke with SHAHRIAR Garcia, met with pt, and (per pt permission) contacted pt's  (Stone, [phone]) via phone. SHAHRIAR Garcia reports pt has been \"screaming\" out since being admitted to medical floor, she/pt was able to be redirected; RN hoping for med recommendations from psychiatrist.     Pt is a ,  female; she lives in an apartment with her . Pt states she is [employer], has no children, completed high school and is a retired (housewife); no legal issues and/or previous  experience noted. Pt enjoys movies and watching TV; (per Stone) her support system includes her , brother, and two sisters.       ASSESSMENT: Pt is alert and oriented x3 (person, place, reason for hospital admission, unable to recall year); mood is anxious with constricted affect, speech seems to include paucity, thought content appears relevant, motor activity is fairly tense. Pt denies any SI, HI, or A/V/T hallucinations; she is future oriented regarding going home and being with her . Pt's  reports he has not heard pt make any comments about wanting to die, hurt or kill herself, and/or harm others; he states he has noticed pt tending to \"shut down, saying 'no' to everything, and staring off\". Stone reports pt cut herself 1 time with scissors (pt's  does not believe this was a suicide attempt); no other self harm and/or suicide attempts noted. Pt's  reports pt has been sleeping more than normal for the last week or so; he states her appetite is \"pretty good\". Pt currently unable to rate depression and/or anxiety; Stone reports pt has struggled with depression and anxiety for " "\"most of her life\", he states pt has seen a psychiatrist via telehealth (Dr. Gill) at [facility] (last appointment 1 month ago). Pt has been hospitalized at [facility] (July 2018); behavioral health diagnoses include Bipolar disorder (as a teen), h/o dementia, anxiety and (increased) depression. Medications via home med list include Abilify, Aricept, Prozac; unclear if pt is taking/prescribed Depakote or Carafate. Per pt's , pt has no history of violence or trauma; she/pt reports feeling safe at home and wonders \"when can (she) go home\"?    Stone denies substance use history; he states he wife does not drink, smoke, and/or use illicit drugs. ETOH and UDS negative.       PLAN: Pt admitted to medical floor; she wants to return home with her  post discharge. Pt is already connected with outpatient psychiatry; no further resource needs noted at this time. Psychiatrist, Dr. Ramesh, to see pt tomorrow; discussed aforementioned content with SHAHRIAR Garcia. Access Center to follow.   "

## 2022-08-19 NOTE — CASE MANAGEMENT/SOCIAL WORK
Discharge Planning Assessment  [facility]     Patient Name: Krupa Mcmanus  MRN: [id number]  Today's Date: 8/19/2022    Admit Date: 8/18/2022     Discharge Needs Assessment     Row Name 08/19/22 1240       Living Environment    People in Home spouse    Potentially Unsafe Housing Conditions other (see comments)  no concerns    Primary Care Provided by self;spouse/significant other    Provides Primary Care For no one    Family Caregiver if Needed spouse    Quality of Family Relationships helpful;involved;supportive       Resource/Environmental Concerns    Resource/Environmental Concerns none       Transition Planning    Patient/Family Anticipates Transition to home with family    Patient/Family Anticipated Services at Transition none    Transportation Anticipated family or friend will provide       Discharge Needs Assessment    Readmission Within the Last 30 Days no previous admission in last 30 days    Equipment Currently Used at Home walker, rolling;commode    Concerns to be Addressed discharge planning    Anticipated Changes Related to Illness none    Equipment Needed After Discharge none               Discharge Plan     Row Name 08/19/22 1241       Plan    Plan Home with spouse    Plan Comments CCP made outbound call to patient’s , Stone [phone]. CCP role explained and discharge planning discussed. Face sheet verified and IMM noted. Patient’s PCP is Akosua Staley through [facility]. Patient lives with her , with three steps to the entrance of the home. Patient’s bedroom and bathroom are on the main level. Prior to UTI; patient’s spouse states she was independent with her ADLs but did use a walker. Patient has BSC at home. Patient has used home health in the past and has been to a [facility] facility. Patient’s spouse states he does not want home health referral at this time and is not interested in rehab placement. Patient’s spouse reports if home health is needed, he will discuss with her  PCP. Patient’s spouse plans for her to return home at discharge. Patient’s spouse reports [facility] comes in once a week to set up her pillbox for her and he dispenses her medications to her. Patient’s spouse denies any discharge needs at this time. CCP will follow for any needs to arise. Mague WILSON              Continued Care and Services - Admitted Since 8/18/2022    Coordination has not been started for this encounter.          Demographic Summary     Row Name 08/19/22 1240       General Information    Admission Type inpatient    Arrived From emergency department    Required Notices Provided Important Message from Medicare    Referral Source admission list    Reason for Consult discharge planning    Preferred Language English               Functional Status     Row Name 08/19/22 1240       Functional Status    Usual Activity Tolerance good    Current Activity Tolerance moderate       Functional Status, IADL    Medications assistive equipment    Meal Preparation assistive equipment    Housekeeping assistive equipment    Laundry assistive equipment    Shopping assistive equipment       Mental Status Summary    Recent Changes in Mental Status/Cognitive Functioning memory (remote)               Psychosocial    No documentation.                Abuse/Neglect    No documentation.                Legal    No documentation.                Substance Abuse    No documentation.                Patient Forms    No documentation.                   STEVE Chu

## 2022-08-20 ENCOUNTER — READMISSION MANAGEMENT (OUTPATIENT)
Dept: CALL CENTER | Facility: HOSPITAL | Age: 67
End: 2022-08-20

## 2022-08-20 VITALS
RESPIRATION RATE: 18 BRPM | HEIGHT: 66 IN | HEART RATE: 82 BPM | TEMPERATURE: 98.4 F | DIASTOLIC BLOOD PRESSURE: 55 MMHG | OXYGEN SATURATION: 95 % | BODY MASS INDEX: 35.2 KG/M2 | SYSTOLIC BLOOD PRESSURE: 109 MMHG | WEIGHT: 219 LBS

## 2022-08-20 LAB
BACTERIA SPEC AEROBE CULT: ABNORMAL
GLUCOSE BLDC GLUCOMTR-MCNC: 120 MG/DL (ref 70–130)
GLUCOSE BLDC GLUCOMTR-MCNC: 155 MG/DL (ref 70–130)

## 2022-08-20 PROCEDURE — 94760 N-INVAS EAR/PLS OXIMETRY 1: CPT

## 2022-08-20 PROCEDURE — 94664 DEMO&/EVAL PT USE INHALER: CPT

## 2022-08-20 PROCEDURE — 94799 UNLISTED PULMONARY SVC/PX: CPT

## 2022-08-20 PROCEDURE — 25010000002 HALOPERIDOL LACTATE PER 5 MG: Performed by: SPECIALIST

## 2022-08-20 PROCEDURE — 82962 GLUCOSE BLOOD TEST: CPT

## 2022-08-20 PROCEDURE — 94761 N-INVAS EAR/PLS OXIMETRY MLT: CPT

## 2022-08-20 RX ORDER — DONEPEZIL HYDROCHLORIDE 10 MG/1
10 TABLET, FILM COATED ORAL NIGHTLY
Qty: 30 TABLET | Refills: 0 | Status: ON HOLD | OUTPATIENT
Start: 2022-08-20 | End: 2023-03-07

## 2022-08-20 RX ORDER — LOSARTAN POTASSIUM 25 MG/1
25 TABLET ORAL DAILY
Qty: 30 TABLET | Refills: 0 | Status: ON HOLD | OUTPATIENT
Start: 2022-08-20 | End: 2023-03-07

## 2022-08-20 RX ORDER — CEFDINIR 300 MG/1
300 CAPSULE ORAL 2 TIMES DAILY
Qty: 10 CAPSULE | Refills: 0 | Status: SHIPPED | OUTPATIENT
Start: 2022-08-20 | End: 2022-08-25

## 2022-08-20 RX ADMIN — TIOTROPIUM BROMIDE INHALATION SPRAY 1 PUFF: 3.12 SPRAY, METERED RESPIRATORY (INHALATION) at 07:05

## 2022-08-20 RX ADMIN — FLUOXETINE HYDROCHLORIDE 10 MG: 10 CAPSULE ORAL at 08:30

## 2022-08-20 RX ADMIN — LOSARTAN POTASSIUM 25 MG: 25 TABLET, FILM COATED ORAL at 08:30

## 2022-08-20 RX ADMIN — HALOPERIDOL LACTATE 2 MG: 5 INJECTION, SOLUTION INTRAMUSCULAR at 12:19

## 2022-08-20 RX ADMIN — PANTOPRAZOLE SODIUM 40 MG: 40 TABLET, DELAYED RELEASE ORAL at 08:30

## 2022-08-20 RX ADMIN — ARIPIPRAZOLE 5 MG: 5 TABLET ORAL at 08:30

## 2022-08-20 NOTE — PLAN OF CARE
Goal Outcome Evaluation:  Plan of Care Reviewed With: patient        Progress: no change  Outcome Evaluation: pt received a dose of haldol and slept through the night. pt is ok to d/c from psych standpoint. she remains on antibiotics for UTI.

## 2022-08-20 NOTE — DISCHARGE SUMMARY
Date of Admission: 8/18/2022  Date of Discharge:  8/20/2022  Primary Care Physician: Akosua Staley APRN     Discharge Diagnosis:  Active Hospital Problems    Diagnosis  POA   • **Acute UTI [N39.0]  Yes   • Metabolic encephalopathy [G93.41]  Yes   • Dementia with behavioral disturbance (HCC) [F03.91]  Yes   • Essential hypertension [I10]  Yes   • Type 2 diabetes mellitus with hyperglycemia (HCC) [E11.65]  Yes      Resolved Hospital Problems   No resolved problems to display.       Presenting Problem/History of Present Illness:  Encephalopathy [G93.40]  Acute UTI [N39.0]  Dementia with behavioral disturbance, unspecified dementia type (HCC) [F03.91]     66 year old female who presented to the emergency room with confusion; she has a history of dementia and bipolar disorder; she has been yelling and acting out which is not normal for her according to family; she tells me that she needs to go home but can be redirected; her  was not present; she is not able to give a review of systems    Hospital Course:  The patient is a 66 y.o. female who presented with UTI and metabolic encephalopathy from [facility]. She was admitted and psychiatry consulted due to bipolar disorder and the AMS. She improved with abx treatment. She has allergy listed to sulfa and quinolone as anaphylaxis. She has a low severity allergy to cefaclor however she tolerated rocephin fine during admission. Will transition to cefdinir to complete her antibiotic course.    DM and HTN: Home medication were held and she was give losartan. Tolerated well so will continue this regimen at discharge. Resuming previous home regimen for DM. Also resuming previous home psychiatric regimen as recommended.    Exam Today:  gen aa nad  heent ncat eomi  cv rrr  Lung ctab with some decreased breath sounds noted  abd ndnt  Ext no pitting or cyanosis  Neuro cn2-12 grossly intact  Psych interactive and appropriate during exam    Results:  CXR  A single  portable view of the chest was obtained. The cardiac silhouette  is upper normal in size. There is calcific aortic atherosclerosis. There  are low lung volumes. There is no focal consolidation or effusion. There  is multilevel degenerative disc disease. There are surgical clips  projecting over the right upper quadrant.    Procedures Performed:         Consults:   Consults     Date and Time Order Name Status Description    8/18/2022  8:14 PM Inpatient Psychiatrist Consult Completed     8/18/2022  6:06 PM Inpatient Psychiatrist Consult Completed     8/18/2022  5:31 PM [facility] (on-call MD unless specified) Details             Discharge Disposition:  Home or Self Care    Discharge Medications:     Discharge Medications      New Medications      Instructions Start Date   cefdinir 300 MG capsule  Commonly known as: OMNICEF   300 mg, Oral, 2 Times Daily         Changes to Medications      Instructions Start Date   FLUoxetine 10 MG capsule  Commonly known as: PROzac  What changed:   · how much to take  · when to take this   10 mg, Oral, Daily      metFORMIN 500 MG tablet  Commonly known as: GLUCOPHAGE  What changed: when to take this   500 mg, Oral, Daily With Breakfast      pantoprazole 40 MG EC tablet  Commonly known as: PROTONIX  What changed: when to take this   40 mg, Oral, Daily      polyethylene glycol 17 g packet  Commonly known as: MIRALAX  What changed:   · when to take this  · reasons to take this   17 g, Oral, Daily         Continue These Medications      Instructions Start Date   acetaminophen 325 MG tablet  Commonly known as: TYLENOL   650 mg, Oral, Every 4 Hours PRN      albuterol sulfate  (90 Base) MCG/ACT inhaler  Commonly known as: PROVENTIL HFA;VENTOLIN HFA;PROAIR HFA   2 puffs, Inhalation, Every 4 Hours PRN      ARIPiprazole 5 MG tablet  Commonly known as: ABILIFY   2.5 mg, Oral, Daily      atorvastatin 20 MG tablet  Commonly known as: LIPITOR   20 mg, Oral, Nightly      divalproex 250 MG 24 hr  tablet  Commonly known as: DEPAKOTE ER   250 mg, Oral, Nightly      donepezil 10 MG tablet  Commonly known as: ARICEPT   10 mg, Oral, Nightly      fluticasone 50 MCG/ACT nasal spray  Commonly known as: FLONASE   2 sprays, Nasal, Daily      guaiFENesin 600 MG 12 hr tablet  Commonly known as: MUCINEX   1,200 mg, Oral, 2 Times Daily PRN      loratadine 10 MG tablet  Commonly known as: CLARITIN   10 mg, Oral, Daily      losartan 25 MG tablet  Commonly known as: COZAAR   25 mg, Oral, Daily      meclizine 25 MG tablet  Commonly known as: ANTIVERT   25 mg, Oral, 4 Times Daily PRN      melatonin 1 MG tablet   3 mg, Oral, Nightly PRN      memantine 5 MG tablet  Commonly known as: NAMENDA   10 mg, Oral, 2 Times Daily      mirtazapine 15 MG tablet  Commonly known as: REMERON   15 mg, Oral, Nightly      nystatin 313944 UNIT/GM powder  Commonly known as: MYCOSTATIN   Topical, 3 Times Daily      ondansetron ODT 4 MG disintegrating tablet  Commonly known as: Zofran ODT   4 mg, Translingual, Every 8 Hours PRN      oxybutynin 5 MG tablet  Commonly known as: DITROPAN   5 mg, Oral, 2 Times Daily      QUEtiapine 100 MG tablet  Commonly known as: SEROquel   200 mg, Oral, Nightly      Tiotropium Bromide Monohydrate 1.25 MCG/ACT aerosol solution inhaler  Commonly known as: SPIRIVA RESPIMAT   2 puffs, Inhalation, Daily      venlafaxine 37.5 MG tablet  Commonly known as: EFFEXOR   37.5 mg, Oral, 2 Times Daily         Stop These Medications    furosemide 40 MG tablet  Commonly known as: LASIX     hydroCHLOROthiazide 25 MG tablet  Commonly known as: HYDRODIURIL     potassium chloride 20 mEq/15 mL solution  Commonly known as: KAYCIEL            Discharge Diet:     Activity at Discharge:     Follow-up Appointments:   Follow-up Information     Akosua Staley APRN Follow up.    Specialty: Nurse Practitioner  Contact information:  [address] [address] [phone]                         Test Results Pending at  Discharge:       Haider Martinez MD  08/20/22  13:56 EDT    Time Spent on Discharge Activities: >30 minutes    Dictated portions using Dragon dictation software.  During the entire encounter, I was wearing recommended PPE including face mask and eye protection. Hand sanitization was performed prior to entering room and upon exit.

## 2022-08-20 NOTE — NURSING NOTE
Access center follow up.    Pt. alert and oriented x3. She is lying in bed, talkative with this writer. Pt. is pleasant and cooperative. Yesterday pt. agitated/confused, yelling and attempting to get OOB. She has been difficult to re-direct. Pt. seen by Psychiatrist yesterday. Hx.of bipolar, early dementia. D/C plan per spouse request home with family. PRN Haldol injection given 8/19/22 for agitation. Pt. slept through the night. She continues on abilify,aricept, depakote ER, prozac. Access following.

## 2022-08-21 NOTE — OUTREACH NOTE
Prep Survey    Flowsheet Row Responses   [facility] patient discharged from? [facility]   Is LACE score < 7 ? No   Emergency Room discharge w/ pulse ox? No   Eligibility Readm Mgmt   Discharge diagnosis Acute UTI, Metabolic encephalopathy    Does the patient have one of the following disease processes/diagnoses(primary or secondary)? Other   Does the patient have Home health ordered? No   Is there a DME ordered? No   Prep survey completed? Yes          PARI NEWTON - Registered Nurse

## 2022-08-24 ENCOUNTER — READMISSION MANAGEMENT (OUTPATIENT)
Dept: CALL CENTER | Facility: HOSPITAL | Age: 67
End: 2022-08-24

## 2022-08-24 NOTE — OUTREACH NOTE
Medical Week 1 Survey    Flowsheet Row Responses   [facility] patient discharged from? [place]   Does the patient have one of the following disease processes/diagnoses(primary or secondary)? Other   Week 1 attempt successful? Yes   Call start time 1522   Call end time 1527   Discharge diagnosis Acute UTI, Metabolic encephalopathy    Person spoke with today (if not patient) and relationship Stone,    Meds reviewed with patient/caregiver? Yes   Is the patient having any side effects they believe may be caused by any medication additions or changes? No   Does the patient have all medications ordered at discharge? Yes   Is the patient taking all medications as directed (includes completed medication regime)? Yes   Does the patient have a primary care provider?  Yes   Does the patient have an appointment with their PCP within 7 days of discharge? Yes   Has the patient kept scheduled appointments due by today? Yes   What is the Home health agency?  had home visit x1 care coordinator   Has home health visited the patient within 72 hours of discharge? N/A   Psychosocial issues? No   Did the patient receive a copy of their discharge instructions? Yes   Nursing interventions Reviewed instructions with patient   What is the patient's perception of their health status since discharge? Improving   Is the patient/caregiver able to teach back signs and symptoms related to disease process for when to call PCP? Yes   Is the patient/caregiver able to teach back signs and symptoms related to disease process for when to call 911? Yes   Is the patient/caregiver able to teach back the hierarchy of who to call/visit for symptoms/problems? PCP, Specialist, Home health nurse, Urgent Care, ED, 911 Yes   Additional teach back comments  states pt is free of UTI symptoms   Week 1 call completed? Yes          LADAN KWAN - Registered Nurse

## 2022-08-29 ENCOUNTER — HOSPITAL ENCOUNTER (OUTPATIENT)
Facility: HOSPITAL | Age: 67
Setting detail: OBSERVATION
LOS: 2 days | Discharge: HOME | End: 2022-08-31
Attending: EMERGENCY MEDICINE | Admitting: INTERNAL MEDICINE

## 2022-08-29 DIAGNOSIS — G93.40 ENCEPHALOPATHY: Primary | ICD-10-CM

## 2022-08-29 LAB
ALBUMIN SERPL-MCNC: 4 G/DL (ref 3.5–5.2)
ALBUMIN/GLOB SERPL: 1.6 G/DL
ALP SERPL-CCNC: 111 U/L (ref 39–117)
ALT SERPL W P-5'-P-CCNC: 45 U/L (ref 1–33)
ANION GAP SERPL CALCULATED.3IONS-SCNC: 13.6 MMOL/L (ref 5–15)
AST SERPL-CCNC: 39 U/L (ref 1–32)
BASOPHILS # BLD AUTO: 0.05 10*3/MM3 (ref 0–0.2)
BASOPHILS NFR BLD AUTO: 0.7 % (ref 0–1.5)
BILIRUB SERPL-MCNC: 0.4 MG/DL (ref 0–1.2)
BUN SERPL-MCNC: 11 MG/DL (ref 8–23)
BUN/CREAT SERPL: 15.7 (ref 7–25)
CALCIUM SPEC-SCNC: 9.2 MG/DL (ref 8.6–10.5)
CHLORIDE SERPL-SCNC: 103 MMOL/L (ref 98–107)
CO2 SERPL-SCNC: 23.4 MMOL/L (ref 22–29)
CREAT SERPL-MCNC: 0.7 MG/DL (ref 0.57–1)
DEPRECATED RDW RBC AUTO: 49.5 FL (ref 37–54)
EGFRCR SERPLBLD CKD-EPI 2021: 94.3 ML/MIN/1.73
EOSINOPHIL # BLD AUTO: 0.06 10*3/MM3 (ref 0–0.4)
EOSINOPHIL NFR BLD AUTO: 0.8 % (ref 0.3–6.2)
ERYTHROCYTE [DISTWIDTH] IN BLOOD BY AUTOMATED COUNT: 16.2 % (ref 12.3–15.4)
GLOBULIN UR ELPH-MCNC: 2.5 GM/DL
GLUCOSE BLDC GLUCOMTR-MCNC: 131 MG/DL (ref 70–130)
GLUCOSE SERPL-MCNC: 129 MG/DL (ref 65–99)
HCT VFR BLD AUTO: 36.4 % (ref 34–46.6)
HGB BLD-MCNC: 11.2 G/DL (ref 12–15.9)
HOLD SPECIMEN: NORMAL
HOLD SPECIMEN: NORMAL
IMM GRANULOCYTES # BLD AUTO: 0.01 10*3/MM3 (ref 0–0.05)
IMM GRANULOCYTES NFR BLD AUTO: 0.1 % (ref 0–0.5)
LYMPHOCYTES # BLD AUTO: 1.24 10*3/MM3 (ref 0.7–3.1)
LYMPHOCYTES NFR BLD AUTO: 16.8 % (ref 19.6–45.3)
MCH RBC QN AUTO: 25.6 PG (ref 26.6–33)
MCHC RBC AUTO-ENTMCNC: 30.8 G/DL (ref 31.5–35.7)
MCV RBC AUTO: 83.1 FL (ref 79–97)
MONOCYTES # BLD AUTO: 0.48 10*3/MM3 (ref 0.1–0.9)
MONOCYTES NFR BLD AUTO: 6.5 % (ref 5–12)
NEUTROPHILS NFR BLD AUTO: 5.56 10*3/MM3 (ref 1.7–7)
NEUTROPHILS NFR BLD AUTO: 75.1 % (ref 42.7–76)
NRBC BLD AUTO-RTO: 0.1 /100 WBC (ref 0–0.2)
PLATELET # BLD AUTO: 278 10*3/MM3 (ref 140–450)
PMV BLD AUTO: 10.1 FL (ref 6–12)
POTASSIUM SERPL-SCNC: 4 MMOL/L (ref 3.5–5.2)
PROT SERPL-MCNC: 6.5 G/DL (ref 6–8.5)
RBC # BLD AUTO: 4.38 10*6/MM3 (ref 3.77–5.28)
SARS-COV-2 RNA PNL SPEC NAA+PROBE: NOT DETECTED
SODIUM SERPL-SCNC: 140 MMOL/L (ref 136–145)
WBC NRBC COR # BLD: 7.4 10*3/MM3 (ref 3.4–10.8)
WHOLE BLOOD HOLD COAG: NORMAL
WHOLE BLOOD HOLD SPECIMEN: NORMAL

## 2022-08-29 PROCEDURE — 96374 THER/PROPH/DIAG INJ IV PUSH: CPT

## 2022-08-29 PROCEDURE — 99284 EMERGENCY DEPT VISIT MOD MDM: CPT

## 2022-08-29 PROCEDURE — 25010000002 LORAZEPAM PER 2 MG: Performed by: EMERGENCY MEDICINE

## 2022-08-29 PROCEDURE — 80053 COMPREHEN METABOLIC PANEL: CPT | Performed by: PHYSICIAN ASSISTANT

## 2022-08-29 PROCEDURE — 96372 THER/PROPH/DIAG INJ SC/IM: CPT

## 2022-08-29 PROCEDURE — 85025 COMPLETE CBC W/AUTO DIFF WBC: CPT | Performed by: PHYSICIAN ASSISTANT

## 2022-08-29 PROCEDURE — G0378 HOSPITAL OBSERVATION PER HR: HCPCS

## 2022-08-29 PROCEDURE — 87635 SARS-COV-2 COVID-19 AMP PRB: CPT | Performed by: PHYSICIAN ASSISTANT

## 2022-08-29 PROCEDURE — 25010000002 CEFTRIAXONE PER 250 MG: Performed by: INTERNAL MEDICINE

## 2022-08-29 PROCEDURE — 25010000002 DROPERIDOL PER 5 MG: Performed by: PHYSICIAN ASSISTANT

## 2022-08-29 PROCEDURE — 96375 TX/PRO/DX INJ NEW DRUG ADDON: CPT

## 2022-08-29 PROCEDURE — C9803 HOPD COVID-19 SPEC COLLECT: HCPCS

## 2022-08-29 PROCEDURE — 25010000002 HALOPERIDOL LACTATE PER 5 MG: Performed by: PHYSICIAN ASSISTANT

## 2022-08-29 PROCEDURE — 82962 GLUCOSE BLOOD TEST: CPT

## 2022-08-29 RX ORDER — ACETAMINOPHEN 325 MG/1
650 TABLET ORAL EVERY 4 HOURS PRN
Status: DISCONTINUED | OUTPATIENT
Start: 2022-08-29 | End: 2022-08-31 | Stop reason: HOSPADM

## 2022-08-29 RX ORDER — MEMANTINE HYDROCHLORIDE 10 MG/1
10 TABLET ORAL 2 TIMES DAILY
Status: DISCONTINUED | OUTPATIENT
Start: 2022-08-29 | End: 2022-08-31 | Stop reason: HOSPADM

## 2022-08-29 RX ORDER — ONDANSETRON 4 MG/1
4 TABLET, FILM COATED ORAL EVERY 6 HOURS PRN
Status: DISCONTINUED | OUTPATIENT
Start: 2022-08-29 | End: 2022-08-31 | Stop reason: HOSPADM

## 2022-08-29 RX ORDER — LORAZEPAM 2 MG/ML
1 INJECTION INTRAMUSCULAR ONCE
Status: COMPLETED | OUTPATIENT
Start: 2022-08-29 | End: 2022-08-29

## 2022-08-29 RX ORDER — DROPERIDOL 2.5 MG/ML
1.25 INJECTION, SOLUTION INTRAMUSCULAR; INTRAVENOUS ONCE
Status: COMPLETED | OUTPATIENT
Start: 2022-08-29 | End: 2022-08-29

## 2022-08-29 RX ORDER — DEXTROSE MONOHYDRATE 25 G/50ML
25 INJECTION, SOLUTION INTRAVENOUS
Status: DISCONTINUED | OUTPATIENT
Start: 2022-08-29 | End: 2022-08-31 | Stop reason: HOSPADM

## 2022-08-29 RX ORDER — DONEPEZIL HYDROCHLORIDE 10 MG/1
10 TABLET, FILM COATED ORAL NIGHTLY
Status: DISCONTINUED | OUTPATIENT
Start: 2022-08-29 | End: 2022-08-31 | Stop reason: HOSPADM

## 2022-08-29 RX ORDER — FLUOXETINE HYDROCHLORIDE 20 MG/1
80 CAPSULE ORAL EVERY EVENING
Status: DISCONTINUED | OUTPATIENT
Start: 2022-08-29 | End: 2022-08-31 | Stop reason: HOSPADM

## 2022-08-29 RX ORDER — ATORVASTATIN CALCIUM 20 MG/1
20 TABLET, FILM COATED ORAL NIGHTLY
Status: DISCONTINUED | OUTPATIENT
Start: 2022-08-29 | End: 2022-08-31 | Stop reason: HOSPADM

## 2022-08-29 RX ORDER — LOSARTAN POTASSIUM 25 MG/1
25 TABLET ORAL DAILY
Status: DISCONTINUED | OUTPATIENT
Start: 2022-08-30 | End: 2022-08-31 | Stop reason: HOSPADM

## 2022-08-29 RX ORDER — NITROGLYCERIN 0.4 MG/1
0.4 TABLET SUBLINGUAL
Status: DISCONTINUED | OUTPATIENT
Start: 2022-08-29 | End: 2022-08-31 | Stop reason: HOSPADM

## 2022-08-29 RX ORDER — PANTOPRAZOLE SODIUM 40 MG/1
40 TABLET, DELAYED RELEASE ORAL 2 TIMES DAILY
Status: DISCONTINUED | OUTPATIENT
Start: 2022-08-29 | End: 2022-08-31 | Stop reason: HOSPADM

## 2022-08-29 RX ORDER — ARIPIPRAZOLE 5 MG/1
2.5 TABLET ORAL DAILY
Status: DISCONTINUED | OUTPATIENT
Start: 2022-08-30 | End: 2022-08-31 | Stop reason: HOSPADM

## 2022-08-29 RX ORDER — FLUTICASONE PROPIONATE 50 MCG
2 SPRAY, SUSPENSION (ML) NASAL DAILY
Status: DISCONTINUED | OUTPATIENT
Start: 2022-08-30 | End: 2022-08-31 | Stop reason: HOSPADM

## 2022-08-29 RX ORDER — UREA 10 %
3 LOTION (ML) TOPICAL NIGHTLY PRN
Status: DISCONTINUED | OUTPATIENT
Start: 2022-08-29 | End: 2022-08-31 | Stop reason: HOSPADM

## 2022-08-29 RX ORDER — MIRTAZAPINE 15 MG/1
15 TABLET, FILM COATED ORAL NIGHTLY
Status: DISCONTINUED | OUTPATIENT
Start: 2022-08-29 | End: 2022-08-31 | Stop reason: HOSPADM

## 2022-08-29 RX ORDER — QUETIAPINE FUMARATE 200 MG/1
200 TABLET, FILM COATED ORAL NIGHTLY
Status: DISCONTINUED | OUTPATIENT
Start: 2022-08-29 | End: 2022-08-31 | Stop reason: HOSPADM

## 2022-08-29 RX ORDER — INSULIN LISPRO 100 [IU]/ML
0-9 INJECTION, SOLUTION INTRAVENOUS; SUBCUTANEOUS
Status: DISCONTINUED | OUTPATIENT
Start: 2022-08-30 | End: 2022-08-31 | Stop reason: HOSPADM

## 2022-08-29 RX ORDER — SODIUM CHLORIDE 9 MG/ML
75 INJECTION, SOLUTION INTRAVENOUS CONTINUOUS
Status: DISCONTINUED | OUTPATIENT
Start: 2022-08-29 | End: 2022-08-31 | Stop reason: HOSPADM

## 2022-08-29 RX ORDER — SODIUM CHLORIDE 0.9 % (FLUSH) 0.9 %
10 SYRINGE (ML) INJECTION AS NEEDED
Status: DISCONTINUED | OUTPATIENT
Start: 2022-08-29 | End: 2022-08-31 | Stop reason: HOSPADM

## 2022-08-29 RX ORDER — NICOTINE POLACRILEX 4 MG
15 LOZENGE BUCCAL
Status: DISCONTINUED | OUTPATIENT
Start: 2022-08-29 | End: 2022-08-31 | Stop reason: HOSPADM

## 2022-08-29 RX ORDER — HALOPERIDOL 5 MG/ML
2 INJECTION INTRAMUSCULAR ONCE
Status: COMPLETED | OUTPATIENT
Start: 2022-08-29 | End: 2022-08-29

## 2022-08-29 RX ORDER — GUAIFENESIN 600 MG/1
1200 TABLET, EXTENDED RELEASE ORAL 2 TIMES DAILY PRN
Status: DISCONTINUED | OUTPATIENT
Start: 2022-08-29 | End: 2022-08-31 | Stop reason: HOSPADM

## 2022-08-29 RX ORDER — DIVALPROEX SODIUM 250 MG/1
250 TABLET, EXTENDED RELEASE ORAL NIGHTLY
Status: DISCONTINUED | OUTPATIENT
Start: 2022-08-29 | End: 2022-08-31 | Stop reason: HOSPADM

## 2022-08-29 RX ORDER — ALBUTEROL SULFATE 2.5 MG/3ML
2.5 SOLUTION RESPIRATORY (INHALATION) EVERY 4 HOURS PRN
Status: DISCONTINUED | OUTPATIENT
Start: 2022-08-29 | End: 2022-08-31 | Stop reason: HOSPADM

## 2022-08-29 RX ORDER — OXYBUTYNIN CHLORIDE 5 MG/1
5 TABLET ORAL 2 TIMES DAILY
Status: DISCONTINUED | OUTPATIENT
Start: 2022-08-29 | End: 2022-08-31 | Stop reason: HOSPADM

## 2022-08-29 RX ORDER — ONDANSETRON 2 MG/ML
4 INJECTION INTRAMUSCULAR; INTRAVENOUS EVERY 6 HOURS PRN
Status: DISCONTINUED | OUTPATIENT
Start: 2022-08-29 | End: 2022-08-31 | Stop reason: HOSPADM

## 2022-08-29 RX ADMIN — HALOPERIDOL LACTATE 2 MG: 5 INJECTION, SOLUTION INTRAMUSCULAR at 13:51

## 2022-08-29 RX ADMIN — SODIUM CHLORIDE 75 ML/HR: 9 INJECTION, SOLUTION INTRAVENOUS at 21:31

## 2022-08-29 RX ADMIN — CEFTRIAXONE SODIUM 1 G: 1 INJECTION, POWDER, FOR SOLUTION INTRAMUSCULAR; INTRAVENOUS at 21:30

## 2022-08-29 RX ADMIN — DROPERIDOL 1.25 MG: 2.5 INJECTION, SOLUTION INTRAMUSCULAR; INTRAVENOUS at 14:05

## 2022-08-29 RX ADMIN — LORAZEPAM 1 MG: 2 INJECTION INTRAMUSCULAR; INTRAVENOUS at 14:19

## 2022-08-30 ENCOUNTER — READMISSION MANAGEMENT (OUTPATIENT)
Dept: CALL CENTER | Facility: HOSPITAL | Age: 67
End: 2022-08-30

## 2022-08-30 LAB
ANION GAP SERPL CALCULATED.3IONS-SCNC: 13.1 MMOL/L (ref 5–15)
BUN SERPL-MCNC: 11 MG/DL (ref 8–23)
BUN/CREAT SERPL: 17.7 (ref 7–25)
CALCIUM SPEC-SCNC: 8.5 MG/DL (ref 8.6–10.5)
CHLORIDE SERPL-SCNC: 107 MMOL/L (ref 98–107)
CO2 SERPL-SCNC: 21.9 MMOL/L (ref 22–29)
CREAT SERPL-MCNC: 0.62 MG/DL (ref 0.57–1)
DEPRECATED RDW RBC AUTO: 48.2 FL (ref 37–54)
EGFRCR SERPLBLD CKD-EPI 2021: 97.1 ML/MIN/1.73
ERYTHROCYTE [DISTWIDTH] IN BLOOD BY AUTOMATED COUNT: 16 % (ref 12.3–15.4)
GLUCOSE BLDC GLUCOMTR-MCNC: 122 MG/DL (ref 70–130)
GLUCOSE BLDC GLUCOMTR-MCNC: 129 MG/DL (ref 70–130)
GLUCOSE BLDC GLUCOMTR-MCNC: 142 MG/DL (ref 70–130)
GLUCOSE BLDC GLUCOMTR-MCNC: 163 MG/DL (ref 70–130)
GLUCOSE SERPL-MCNC: 107 MG/DL (ref 65–99)
HBA1C MFR BLD: 6.5 % (ref 4.8–5.6)
HCT VFR BLD AUTO: 31.7 % (ref 34–46.6)
HGB BLD-MCNC: 9.8 G/DL (ref 12–15.9)
MCH RBC QN AUTO: 25.7 PG (ref 26.6–33)
MCHC RBC AUTO-ENTMCNC: 30.9 G/DL (ref 31.5–35.7)
MCV RBC AUTO: 83.2 FL (ref 79–97)
PLATELET # BLD AUTO: 218 10*3/MM3 (ref 140–450)
PMV BLD AUTO: 10.2 FL (ref 6–12)
POTASSIUM SERPL-SCNC: 4.1 MMOL/L (ref 3.5–5.2)
RBC # BLD AUTO: 3.81 10*6/MM3 (ref 3.77–5.28)
SODIUM SERPL-SCNC: 142 MMOL/L (ref 136–145)
WBC NRBC COR # BLD: 6.46 10*3/MM3 (ref 3.4–10.8)

## 2022-08-30 PROCEDURE — 25010000002 CEFTRIAXONE PER 250 MG: Performed by: INTERNAL MEDICINE

## 2022-08-30 PROCEDURE — 36415 COLL VENOUS BLD VENIPUNCTURE: CPT | Performed by: INTERNAL MEDICINE

## 2022-08-30 PROCEDURE — 80048 BASIC METABOLIC PNL TOTAL CA: CPT | Performed by: INTERNAL MEDICINE

## 2022-08-30 PROCEDURE — 82962 GLUCOSE BLOOD TEST: CPT

## 2022-08-30 PROCEDURE — 85027 COMPLETE CBC AUTOMATED: CPT | Performed by: INTERNAL MEDICINE

## 2022-08-30 PROCEDURE — 94664 DEMO&/EVAL PT USE INHALER: CPT

## 2022-08-30 PROCEDURE — 83036 HEMOGLOBIN GLYCOSYLATED A1C: CPT | Performed by: INTERNAL MEDICINE

## 2022-08-30 PROCEDURE — 25010000002 ENOXAPARIN PER 10 MG: Performed by: INTERNAL MEDICINE

## 2022-08-30 PROCEDURE — G0378 HOSPITAL OBSERVATION PER HR: HCPCS

## 2022-08-30 PROCEDURE — 94640 AIRWAY INHALATION TREATMENT: CPT

## 2022-08-30 PROCEDURE — 94799 UNLISTED PULMONARY SVC/PX: CPT

## 2022-08-30 PROCEDURE — 96372 THER/PROPH/DIAG INJ SC/IM: CPT

## 2022-08-30 RX ORDER — HYDROXYZINE HYDROCHLORIDE 10 MG/1
10 TABLET, FILM COATED ORAL 2 TIMES DAILY PRN
Status: DISCONTINUED | OUTPATIENT
Start: 2022-08-30 | End: 2022-08-30

## 2022-08-30 RX ORDER — HYDROXYZINE HYDROCHLORIDE 10 MG/1
10 TABLET, FILM COATED ORAL ONCE AS NEEDED
Status: COMPLETED | OUTPATIENT
Start: 2022-08-30 | End: 2022-08-30

## 2022-08-30 RX ORDER — ENOXAPARIN SODIUM 100 MG/ML
40 INJECTION SUBCUTANEOUS NIGHTLY
Status: DISCONTINUED | OUTPATIENT
Start: 2022-08-30 | End: 2022-08-31 | Stop reason: HOSPADM

## 2022-08-30 RX ADMIN — MEMANTINE HYDROCHLORIDE 10 MG: 10 TABLET, FILM COATED ORAL at 01:31

## 2022-08-30 RX ADMIN — MEMANTINE HYDROCHLORIDE 10 MG: 10 TABLET, FILM COATED ORAL at 09:57

## 2022-08-30 RX ADMIN — DIVALPROEX SODIUM 250 MG: 250 TABLET, EXTENDED RELEASE ORAL at 20:14

## 2022-08-30 RX ADMIN — ATORVASTATIN CALCIUM 20 MG: 20 TABLET, FILM COATED ORAL at 20:14

## 2022-08-30 RX ADMIN — SODIUM CHLORIDE 75 ML/HR: 9 INJECTION, SOLUTION INTRAVENOUS at 15:11

## 2022-08-30 RX ADMIN — FLUOXETINE HYDROCHLORIDE 80 MG: 20 CAPSULE ORAL at 17:37

## 2022-08-30 RX ADMIN — ATORVASTATIN CALCIUM 20 MG: 20 TABLET, FILM COATED ORAL at 01:31

## 2022-08-30 RX ADMIN — OXYBUTYNIN CHLORIDE 5 MG: 5 TABLET ORAL at 09:57

## 2022-08-30 RX ADMIN — DONEPEZIL HYDROCHLORIDE 10 MG: 10 TABLET, FILM COATED ORAL at 20:14

## 2022-08-30 RX ADMIN — PANTOPRAZOLE SODIUM 40 MG: 40 TABLET, DELAYED RELEASE ORAL at 20:14

## 2022-08-30 RX ADMIN — MIRTAZAPINE 15 MG: 15 TABLET, FILM COATED ORAL at 01:31

## 2022-08-30 RX ADMIN — QUETIAPINE FUMARATE 200 MG: 200 TABLET ORAL at 20:14

## 2022-08-30 RX ADMIN — MIRTAZAPINE 15 MG: 15 TABLET, FILM COATED ORAL at 20:14

## 2022-08-30 RX ADMIN — QUETIAPINE FUMARATE 200 MG: 200 TABLET ORAL at 01:31

## 2022-08-30 RX ADMIN — FLUTICASONE PROPIONATE 2 SPRAY: 50 SPRAY, METERED NASAL at 11:54

## 2022-08-30 RX ADMIN — Medication 10 ML: at 20:17

## 2022-08-30 RX ADMIN — ARIPIPRAZOLE 2.5 MG: 5 TABLET ORAL at 09:57

## 2022-08-30 RX ADMIN — CEFTRIAXONE SODIUM 1 G: 1 INJECTION, POWDER, FOR SOLUTION INTRAMUSCULAR; INTRAVENOUS at 20:14

## 2022-08-30 RX ADMIN — MEMANTINE HYDROCHLORIDE 10 MG: 10 TABLET, FILM COATED ORAL at 20:14

## 2022-08-30 RX ADMIN — DIVALPROEX SODIUM 250 MG: 250 TABLET, EXTENDED RELEASE ORAL at 01:31

## 2022-08-30 RX ADMIN — ENOXAPARIN SODIUM 40 MG: 100 INJECTION SUBCUTANEOUS at 20:14

## 2022-08-30 RX ADMIN — HYDROXYZINE HYDROCHLORIDE 10 MG: 10 TABLET ORAL at 13:43

## 2022-08-30 RX ADMIN — TIOTROPIUM BROMIDE INHALATION SPRAY 1 PUFF: 3.12 SPRAY, METERED RESPIRATORY (INHALATION) at 08:48

## 2022-08-30 RX ADMIN — PANTOPRAZOLE SODIUM 40 MG: 40 TABLET, DELAYED RELEASE ORAL at 01:31

## 2022-08-30 RX ADMIN — OXYBUTYNIN CHLORIDE 5 MG: 5 TABLET ORAL at 20:14

## 2022-08-30 RX ADMIN — DONEPEZIL HYDROCHLORIDE 10 MG: 10 TABLET, FILM COATED ORAL at 01:31

## 2022-08-30 RX ADMIN — PANTOPRAZOLE SODIUM 40 MG: 40 TABLET, DELAYED RELEASE ORAL at 09:57

## 2022-08-30 RX ADMIN — OXYBUTYNIN CHLORIDE 5 MG: 5 TABLET ORAL at 01:31

## 2022-08-30 RX ADMIN — FLUOXETINE HYDROCHLORIDE 80 MG: 20 CAPSULE ORAL at 01:31

## 2022-08-30 NOTE — OUTREACH NOTE
Medical Week 2 Survey    Flowsheet Row Responses   [facility] patient discharged from? [place]   Does the patient have one of the following disease processes/diagnoses(primary or secondary)? Other   Week 2 attempt successful? No   Revoke Readmitted          MONTY KWAN - Registered Nurse

## 2022-08-31 ENCOUNTER — HOME HEALTH ADMISSION (OUTPATIENT)
Dept: HOME HEALTH SERVICES | Facility: HOME HEALTHCARE | Age: 67
End: 2022-08-31

## 2022-08-31 ENCOUNTER — READMISSION MANAGEMENT (OUTPATIENT)
Dept: CALL CENTER | Facility: HOSPITAL | Age: 67
End: 2022-08-31

## 2022-08-31 VITALS
WEIGHT: 166.01 LBS | TEMPERATURE: 97.5 F | DIASTOLIC BLOOD PRESSURE: 51 MMHG | OXYGEN SATURATION: 96 % | BODY MASS INDEX: 28.34 KG/M2 | HEART RATE: 85 BPM | HEIGHT: 64 IN | SYSTOLIC BLOOD PRESSURE: 105 MMHG | RESPIRATION RATE: 16 BRPM

## 2022-08-31 LAB
ANION GAP SERPL CALCULATED.3IONS-SCNC: 11.4 MMOL/L (ref 5–15)
BASOPHILS # BLD AUTO: 0.03 10*3/MM3 (ref 0–0.2)
BASOPHILS NFR BLD AUTO: 0.5 % (ref 0–1.5)
BUN SERPL-MCNC: 9 MG/DL (ref 8–23)
BUN/CREAT SERPL: 13.4 (ref 7–25)
CALCIUM SPEC-SCNC: 8.4 MG/DL (ref 8.6–10.5)
CHLORIDE SERPL-SCNC: 107 MMOL/L (ref 98–107)
CO2 SERPL-SCNC: 21.6 MMOL/L (ref 22–29)
CREAT SERPL-MCNC: 0.67 MG/DL (ref 0.57–1)
DEPRECATED RDW RBC AUTO: 46.1 FL (ref 37–54)
EGFRCR SERPLBLD CKD-EPI 2021: 95.3 ML/MIN/1.73
EOSINOPHIL # BLD AUTO: 0.06 10*3/MM3 (ref 0–0.4)
EOSINOPHIL NFR BLD AUTO: 0.9 % (ref 0.3–6.2)
ERYTHROCYTE [DISTWIDTH] IN BLOOD BY AUTOMATED COUNT: 15.8 % (ref 12.3–15.4)
GLUCOSE BLDC GLUCOMTR-MCNC: 113 MG/DL (ref 70–130)
GLUCOSE BLDC GLUCOMTR-MCNC: 150 MG/DL (ref 70–130)
GLUCOSE SERPL-MCNC: 120 MG/DL (ref 65–99)
HCT VFR BLD AUTO: 30.4 % (ref 34–46.6)
HGB BLD-MCNC: 9.5 G/DL (ref 12–15.9)
IMM GRANULOCYTES # BLD AUTO: 0.02 10*3/MM3 (ref 0–0.05)
IMM GRANULOCYTES NFR BLD AUTO: 0.3 % (ref 0–0.5)
LYMPHOCYTES # BLD AUTO: 1.25 10*3/MM3 (ref 0.7–3.1)
LYMPHOCYTES NFR BLD AUTO: 19.5 % (ref 19.6–45.3)
MCH RBC QN AUTO: 25.6 PG (ref 26.6–33)
MCHC RBC AUTO-ENTMCNC: 31.3 G/DL (ref 31.5–35.7)
MCV RBC AUTO: 81.9 FL (ref 79–97)
MONOCYTES # BLD AUTO: 0.38 10*3/MM3 (ref 0.1–0.9)
MONOCYTES NFR BLD AUTO: 5.9 % (ref 5–12)
NEUTROPHILS NFR BLD AUTO: 4.68 10*3/MM3 (ref 1.7–7)
NEUTROPHILS NFR BLD AUTO: 72.9 % (ref 42.7–76)
NRBC BLD AUTO-RTO: 0 /100 WBC (ref 0–0.2)
PLATELET # BLD AUTO: 229 10*3/MM3 (ref 140–450)
PMV BLD AUTO: 10.5 FL (ref 6–12)
POTASSIUM SERPL-SCNC: 4.1 MMOL/L (ref 3.5–5.2)
RBC # BLD AUTO: 3.71 10*6/MM3 (ref 3.77–5.28)
SODIUM SERPL-SCNC: 140 MMOL/L (ref 136–145)
WBC NRBC COR # BLD: 6.42 10*3/MM3 (ref 3.4–10.8)

## 2022-08-31 PROCEDURE — 85025 COMPLETE CBC W/AUTO DIFF WBC: CPT | Performed by: INTERNAL MEDICINE

## 2022-08-31 PROCEDURE — 82962 GLUCOSE BLOOD TEST: CPT

## 2022-08-31 PROCEDURE — 97161 PT EVAL LOW COMPLEX 20 MIN: CPT

## 2022-08-31 PROCEDURE — 80048 BASIC METABOLIC PNL TOTAL CA: CPT | Performed by: INTERNAL MEDICINE

## 2022-08-31 PROCEDURE — 97535 SELF CARE MNGMENT TRAINING: CPT

## 2022-08-31 PROCEDURE — 94799 UNLISTED PULMONARY SVC/PX: CPT

## 2022-08-31 PROCEDURE — G0378 HOSPITAL OBSERVATION PER HR: HCPCS

## 2022-08-31 PROCEDURE — 94664 DEMO&/EVAL PT USE INHALER: CPT

## 2022-08-31 PROCEDURE — 97166 OT EVAL MOD COMPLEX 45 MIN: CPT

## 2022-08-31 RX ORDER — NITROFURANTOIN 25; 75 MG/1; MG/1
100 CAPSULE ORAL 2 TIMES DAILY
Qty: 10 CAPSULE | Refills: 0 | Status: SHIPPED | OUTPATIENT
Start: 2022-08-31 | End: 2022-09-05

## 2022-08-31 RX ADMIN — OXYBUTYNIN CHLORIDE 5 MG: 5 TABLET ORAL at 08:20

## 2022-08-31 RX ADMIN — TIOTROPIUM BROMIDE INHALATION SPRAY 1 PUFF: 3.12 SPRAY, METERED RESPIRATORY (INHALATION) at 07:22

## 2022-08-31 RX ADMIN — MEMANTINE HYDROCHLORIDE 10 MG: 10 TABLET, FILM COATED ORAL at 08:20

## 2022-08-31 RX ADMIN — LOSARTAN POTASSIUM 25 MG: 25 TABLET, FILM COATED ORAL at 08:20

## 2022-08-31 RX ADMIN — PANTOPRAZOLE SODIUM 40 MG: 40 TABLET, DELAYED RELEASE ORAL at 08:20

## 2022-08-31 RX ADMIN — SODIUM CHLORIDE 75 ML/HR: 9 INJECTION, SOLUTION INTRAVENOUS at 04:39

## 2022-08-31 RX ADMIN — ARIPIPRAZOLE 2.5 MG: 5 TABLET ORAL at 08:20

## 2022-08-31 RX ADMIN — FLUTICASONE PROPIONATE 2 SPRAY: 50 SPRAY, METERED NASAL at 08:21

## 2022-09-06 ENCOUNTER — READMISSION MANAGEMENT (OUTPATIENT)
Dept: CALL CENTER | Facility: HOSPITAL | Age: 67
End: 2022-09-06

## 2022-09-06 NOTE — OUTREACH NOTE
Medical Week 1 Survey    Flowsheet Row Responses   [facility] patient discharged from? [place]   Does the patient have one of the following disease processes/diagnoses(primary or secondary)? Other   Week 1 attempt successful? No   Unsuccessful attempts Attempt 1          CARLOS Churchill Registered Nurse

## 2022-09-09 ENCOUNTER — READMISSION MANAGEMENT (OUTPATIENT)
Dept: CALL CENTER | Facility: HOSPITAL | Age: 67
End: 2022-09-09

## 2022-09-09 NOTE — OUTREACH NOTE
Medical Week 2 Survey    Flowsheet Row Responses   [facility], [place], operated by [facility] patient discharged from? [place]   Does the patient have one of the following disease processes/diagnoses(primary or secondary)? Other   Week 2 attempt successful? No   Unsuccessful attempts Attempt 1          LADAN KWAN - Registered Nurse

## 2022-09-13 ENCOUNTER — READMISSION MANAGEMENT (OUTPATIENT)
Dept: CALL CENTER | Facility: HOSPITAL | Age: 67
End: 2022-09-13

## 2022-09-13 NOTE — OUTREACH NOTE
Medical Week 2 Survey    Flowsheet Row Responses   [facility] patient discharged from? [place]   Does the patient have one of the following disease processes/diagnoses(primary or secondary)? Other   Week 2 attempt successful? No   Unsuccessful attempts Attempt 2          SOL KWAN - Licensed Nurse

## 2022-09-24 ENCOUNTER — HOSPITAL ENCOUNTER (EMERGENCY)
Facility: HOSPITAL | Age: 67
Discharge: HOME | End: 2022-09-24

## 2022-09-24 VITALS — OXYGEN SATURATION: 98 % | TEMPERATURE: 96.4 F | HEART RATE: 118 BPM | RESPIRATION RATE: 18 BRPM

## 2022-09-24 PROCEDURE — 99211 OFF/OP EST MAY X REQ PHY/QHP: CPT

## 2022-09-24 NOTE — ED NOTES
Pt arrived by PV reports rectal bleeding x2-3 weeks.     Patient was placed in face mask during first look triage.  Patient was wearing a face mask throughout encounter.  I wore personal protective equipment throughout the encounter.  Hand hygiene was performed before and after patient encounter.

## 2023-03-07 ENCOUNTER — APPOINTMENT (OUTPATIENT)
Dept: CT IMAGING | Facility: HOSPITAL | Age: 68
DRG: 871 | End: 2023-03-07
Payer: MEDICARE

## 2023-03-07 ENCOUNTER — HOSPITAL ENCOUNTER (INPATIENT)
Facility: HOSPITAL | Age: 68
LOS: 7 days | Discharge: NURSING, REHAB OR LONG-TERM CARE | DRG: 871 | End: 2023-03-14
Attending: EMERGENCY MEDICINE | Admitting: INTERNAL MEDICINE
Payer: MEDICARE

## 2023-03-07 ENCOUNTER — APPOINTMENT (OUTPATIENT)
Dept: GENERAL RADIOLOGY | Facility: HOSPITAL | Age: 68
DRG: 871 | End: 2023-03-07
Payer: MEDICARE

## 2023-03-07 DIAGNOSIS — E11.65 UNCONTROLLED TYPE 2 DIABETES MELLITUS WITH HYPERGLYCEMIA: ICD-10-CM

## 2023-03-07 DIAGNOSIS — R65.20 SEPSIS WITH ENCEPHALOPATHY WITHOUT SEPTIC SHOCK, DUE TO UNSPECIFIED ORGANISM: Primary | ICD-10-CM

## 2023-03-07 DIAGNOSIS — J18.9 PNEUMONIA OF BOTH LOWER LOBES DUE TO INFECTIOUS ORGANISM: ICD-10-CM

## 2023-03-07 DIAGNOSIS — A41.9 SEPSIS WITH ENCEPHALOPATHY WITHOUT SEPTIC SHOCK, DUE TO UNSPECIFIED ORGANISM: Primary | ICD-10-CM

## 2023-03-07 DIAGNOSIS — F03.918 DEMENTIA WITH BEHAVIORAL DISTURBANCE: ICD-10-CM

## 2023-03-07 DIAGNOSIS — G93.40 SEPSIS WITH ENCEPHALOPATHY WITHOUT SEPTIC SHOCK, DUE TO UNSPECIFIED ORGANISM: Primary | ICD-10-CM

## 2023-03-07 PROBLEM — Z65.9 SOCIAL PROBLEM: Status: ACTIVE | Noted: 2023-03-07

## 2023-03-07 LAB
ALBUMIN SERPL-MCNC: 4.1 G/DL (ref 3.5–5.2)
ALBUMIN/GLOB SERPL: 1.3 G/DL
ALP SERPL-CCNC: 131 U/L (ref 39–117)
ALT SERPL W P-5'-P-CCNC: 25 U/L (ref 1–33)
ANION GAP SERPL CALCULATED.3IONS-SCNC: 11.7 MMOL/L (ref 5–15)
AST SERPL-CCNC: 30 U/L (ref 1–32)
B PARAPERT DNA SPEC QL NAA+PROBE: NOT DETECTED
B PERT DNA SPEC QL NAA+PROBE: NOT DETECTED
BASOPHILS # BLD AUTO: 0.04 10*3/MM3 (ref 0–0.2)
BASOPHILS NFR BLD AUTO: 0.2 % (ref 0–1.5)
BILIRUB SERPL-MCNC: 0.7 MG/DL (ref 0–1.2)
BILIRUB UR QL STRIP: NEGATIVE
BUN SERPL-MCNC: 13 MG/DL (ref 8–23)
BUN/CREAT SERPL: 17.1 (ref 7–25)
C PNEUM DNA NPH QL NAA+NON-PROBE: NOT DETECTED
CALCIUM SPEC-SCNC: 9.4 MG/DL (ref 8.6–10.5)
CHLORIDE SERPL-SCNC: 103 MMOL/L (ref 98–107)
CLARITY UR: ABNORMAL
CO2 SERPL-SCNC: 24.3 MMOL/L (ref 22–29)
COLOR UR: YELLOW
CREAT SERPL-MCNC: 0.76 MG/DL (ref 0.57–1)
D-LACTATE SERPL-SCNC: 2.5 MMOL/L (ref 0.5–2)
D-LACTATE SERPL-SCNC: 3 MMOL/L (ref 0.5–2)
D-LACTATE SERPL-SCNC: 3.8 MMOL/L (ref 0.5–2)
D-LACTATE SERPL-SCNC: 3.8 MMOL/L (ref 0.5–2)
DEPRECATED RDW RBC AUTO: 41.7 FL (ref 37–54)
EGFRCR SERPLBLD CKD-EPI 2021: 84.9 ML/MIN/1.73
EOSINOPHIL # BLD AUTO: 0 10*3/MM3 (ref 0–0.4)
EOSINOPHIL NFR BLD AUTO: 0 % (ref 0.3–6.2)
ERYTHROCYTE [DISTWIDTH] IN BLOOD BY AUTOMATED COUNT: 13.4 % (ref 12.3–15.4)
FLUAV SUBTYP SPEC NAA+PROBE: NOT DETECTED
FLUBV RNA ISLT QL NAA+PROBE: NOT DETECTED
GLOBULIN UR ELPH-MCNC: 3.2 GM/DL
GLUCOSE BLDC GLUCOMTR-MCNC: 110 MG/DL (ref 70–130)
GLUCOSE BLDC GLUCOMTR-MCNC: 125 MG/DL (ref 70–130)
GLUCOSE SERPL-MCNC: 142 MG/DL (ref 65–99)
GLUCOSE UR STRIP-MCNC: NEGATIVE MG/DL
HADV DNA SPEC NAA+PROBE: NOT DETECTED
HCOV 229E RNA SPEC QL NAA+PROBE: NOT DETECTED
HCOV HKU1 RNA SPEC QL NAA+PROBE: NOT DETECTED
HCOV NL63 RNA SPEC QL NAA+PROBE: NOT DETECTED
HCOV OC43 RNA SPEC QL NAA+PROBE: NOT DETECTED
HCT VFR BLD AUTO: 38.9 % (ref 34–46.6)
HGB BLD-MCNC: 12 G/DL (ref 12–15.9)
HGB UR QL STRIP.AUTO: NEGATIVE
HMPV RNA NPH QL NAA+NON-PROBE: NOT DETECTED
HPIV1 RNA ISLT QL NAA+PROBE: NOT DETECTED
HPIV2 RNA SPEC QL NAA+PROBE: NOT DETECTED
HPIV3 RNA NPH QL NAA+PROBE: NOT DETECTED
HPIV4 P GENE NPH QL NAA+PROBE: NOT DETECTED
IMM GRANULOCYTES # BLD AUTO: 0.06 10*3/MM3 (ref 0–0.05)
IMM GRANULOCYTES NFR BLD AUTO: 0.4 % (ref 0–0.5)
KETONES UR QL STRIP: ABNORMAL
LEUKOCYTE ESTERASE UR QL STRIP.AUTO: NEGATIVE
LYMPHOCYTES # BLD AUTO: 1.77 10*3/MM3 (ref 0.7–3.1)
LYMPHOCYTES NFR BLD AUTO: 10.7 % (ref 19.6–45.3)
M PNEUMO IGG SER IA-ACNC: NOT DETECTED
MCH RBC QN AUTO: 26.5 PG (ref 26.6–33)
MCHC RBC AUTO-ENTMCNC: 30.8 G/DL (ref 31.5–35.7)
MCV RBC AUTO: 85.9 FL (ref 79–97)
MONOCYTES # BLD AUTO: 0.73 10*3/MM3 (ref 0.1–0.9)
MONOCYTES NFR BLD AUTO: 4.4 % (ref 5–12)
NEUTROPHILS NFR BLD AUTO: 13.88 10*3/MM3 (ref 1.7–7)
NEUTROPHILS NFR BLD AUTO: 84.3 % (ref 42.7–76)
NITRITE UR QL STRIP: NEGATIVE
NRBC BLD AUTO-RTO: 0 /100 WBC (ref 0–0.2)
PH UR STRIP.AUTO: 5.5 [PH] (ref 5–8)
PLATELET # BLD AUTO: 293 10*3/MM3 (ref 140–450)
PMV BLD AUTO: 10.4 FL (ref 6–12)
POTASSIUM SERPL-SCNC: 3.9 MMOL/L (ref 3.5–5.2)
PROT SERPL-MCNC: 7.3 G/DL (ref 6–8.5)
PROT UR QL STRIP: ABNORMAL
RBC # BLD AUTO: 4.53 10*6/MM3 (ref 3.77–5.28)
RHINOVIRUS RNA SPEC NAA+PROBE: NOT DETECTED
RSV RNA NPH QL NAA+NON-PROBE: NOT DETECTED
SARS-COV-2 RNA NPH QL NAA+NON-PROBE: NOT DETECTED
SODIUM SERPL-SCNC: 139 MMOL/L (ref 136–145)
SP GR UR STRIP: >=1.03 (ref 1–1.03)
UROBILINOGEN UR QL STRIP: ABNORMAL
WBC NRBC COR # BLD: 16.48 10*3/MM3 (ref 3.4–10.8)

## 2023-03-07 PROCEDURE — 0202U NFCT DS 22 TRGT SARS-COV-2: CPT | Performed by: EMERGENCY MEDICINE

## 2023-03-07 PROCEDURE — 82962 GLUCOSE BLOOD TEST: CPT

## 2023-03-07 PROCEDURE — 71045 X-RAY EXAM CHEST 1 VIEW: CPT

## 2023-03-07 PROCEDURE — 83605 ASSAY OF LACTIC ACID: CPT | Performed by: EMERGENCY MEDICINE

## 2023-03-07 PROCEDURE — 99285 EMERGENCY DEPT VISIT HI MDM: CPT

## 2023-03-07 PROCEDURE — 25010000002 ENOXAPARIN PER 10 MG: Performed by: INTERNAL MEDICINE

## 2023-03-07 PROCEDURE — 36415 COLL VENOUS BLD VENIPUNCTURE: CPT | Performed by: EMERGENCY MEDICINE

## 2023-03-07 PROCEDURE — 80053 COMPREHEN METABOLIC PANEL: CPT | Performed by: EMERGENCY MEDICINE

## 2023-03-07 PROCEDURE — 25010000002 CEFTRIAXONE PER 250 MG: Performed by: EMERGENCY MEDICINE

## 2023-03-07 PROCEDURE — 81003 URINALYSIS AUTO W/O SCOPE: CPT | Performed by: EMERGENCY MEDICINE

## 2023-03-07 PROCEDURE — 70450 CT HEAD/BRAIN W/O DYE: CPT

## 2023-03-07 PROCEDURE — 85025 COMPLETE CBC W/AUTO DIFF WBC: CPT | Performed by: EMERGENCY MEDICINE

## 2023-03-07 PROCEDURE — 87040 BLOOD CULTURE FOR BACTERIA: CPT | Performed by: EMERGENCY MEDICINE

## 2023-03-07 RX ORDER — INSULIN LISPRO 100 [IU]/ML
0-9 INJECTION, SOLUTION INTRAVENOUS; SUBCUTANEOUS
Status: DISCONTINUED | OUTPATIENT
Start: 2023-03-07 | End: 2023-03-14 | Stop reason: HOSPADM

## 2023-03-07 RX ORDER — DEXTROSE MONOHYDRATE 25 G/50ML
25 INJECTION, SOLUTION INTRAVENOUS
Status: DISCONTINUED | OUTPATIENT
Start: 2023-03-07 | End: 2023-03-14 | Stop reason: HOSPADM

## 2023-03-07 RX ORDER — SODIUM CHLORIDE 9 MG/ML
125 INJECTION, SOLUTION INTRAVENOUS CONTINUOUS
Status: DISCONTINUED | OUTPATIENT
Start: 2023-03-07 | End: 2023-03-10

## 2023-03-07 RX ORDER — DIVALPROEX SODIUM 250 MG/1
250 TABLET, EXTENDED RELEASE ORAL NIGHTLY
Status: DISCONTINUED | OUTPATIENT
Start: 2023-03-07 | End: 2023-03-10

## 2023-03-07 RX ORDER — SODIUM CHLORIDE 0.9 % (FLUSH) 0.9 %
10 SYRINGE (ML) INJECTION AS NEEDED
Status: DISCONTINUED | OUTPATIENT
Start: 2023-03-07 | End: 2023-03-14 | Stop reason: HOSPADM

## 2023-03-07 RX ORDER — GUAIFENESIN 600 MG/1
1200 TABLET, EXTENDED RELEASE ORAL 2 TIMES DAILY PRN
Status: DISCONTINUED | OUTPATIENT
Start: 2023-03-07 | End: 2023-03-14 | Stop reason: HOSPADM

## 2023-03-07 RX ORDER — CETIRIZINE HYDROCHLORIDE 10 MG/1
10 TABLET ORAL DAILY
Status: DISCONTINUED | OUTPATIENT
Start: 2023-03-07 | End: 2023-03-07

## 2023-03-07 RX ORDER — OXYBUTYNIN CHLORIDE 5 MG/1
5 TABLET ORAL 2 TIMES DAILY
Status: DISCONTINUED | OUTPATIENT
Start: 2023-03-07 | End: 2023-03-07

## 2023-03-07 RX ORDER — ACETAMINOPHEN 500 MG
1000 TABLET ORAL ONCE
Status: COMPLETED | OUTPATIENT
Start: 2023-03-07 | End: 2023-03-07

## 2023-03-07 RX ORDER — MEMANTINE HYDROCHLORIDE 10 MG/1
10 TABLET ORAL 2 TIMES DAILY
Status: DISCONTINUED | OUTPATIENT
Start: 2023-03-07 | End: 2023-03-07

## 2023-03-07 RX ORDER — DONEPEZIL HYDROCHLORIDE 10 MG/1
10 TABLET, FILM COATED ORAL NIGHTLY
Status: DISCONTINUED | OUTPATIENT
Start: 2023-03-07 | End: 2023-03-07

## 2023-03-07 RX ORDER — ENOXAPARIN SODIUM 100 MG/ML
40 INJECTION SUBCUTANEOUS DAILY
Status: DISCONTINUED | OUTPATIENT
Start: 2023-03-07 | End: 2023-03-14 | Stop reason: HOSPADM

## 2023-03-07 RX ORDER — MIRTAZAPINE 15 MG/1
15 TABLET, FILM COATED ORAL NIGHTLY
Status: DISCONTINUED | OUTPATIENT
Start: 2023-03-07 | End: 2023-03-14 | Stop reason: HOSPADM

## 2023-03-07 RX ORDER — UREA 10 %
3 LOTION (ML) TOPICAL NIGHTLY PRN
Status: DISCONTINUED | OUTPATIENT
Start: 2023-03-07 | End: 2023-03-14 | Stop reason: HOSPADM

## 2023-03-07 RX ORDER — MECLIZINE HYDROCHLORIDE 25 MG/1
25 TABLET ORAL 4 TIMES DAILY PRN
Status: DISCONTINUED | OUTPATIENT
Start: 2023-03-07 | End: 2023-03-14 | Stop reason: HOSPADM

## 2023-03-07 RX ORDER — NICOTINE POLACRILEX 4 MG
15 LOZENGE BUCCAL
Status: DISCONTINUED | OUTPATIENT
Start: 2023-03-07 | End: 2023-03-14 | Stop reason: HOSPADM

## 2023-03-07 RX ORDER — PANTOPRAZOLE SODIUM 40 MG/1
40 TABLET, DELAYED RELEASE ORAL 2 TIMES DAILY
Status: DISCONTINUED | OUTPATIENT
Start: 2023-03-07 | End: 2023-03-14 | Stop reason: HOSPADM

## 2023-03-07 RX ORDER — VENLAFAXINE 37.5 MG/1
37.5 TABLET ORAL 2 TIMES DAILY
Status: DISCONTINUED | OUTPATIENT
Start: 2023-03-07 | End: 2023-03-07

## 2023-03-07 RX ORDER — SODIUM CHLORIDE 0.9 % (FLUSH) 0.9 %
10 SYRINGE (ML) INJECTION EVERY 12 HOURS SCHEDULED
Status: DISCONTINUED | OUTPATIENT
Start: 2023-03-07 | End: 2023-03-14 | Stop reason: HOSPADM

## 2023-03-07 RX ORDER — SODIUM CHLORIDE 9 MG/ML
40 INJECTION, SOLUTION INTRAVENOUS AS NEEDED
Status: DISCONTINUED | OUTPATIENT
Start: 2023-03-07 | End: 2023-03-14 | Stop reason: HOSPADM

## 2023-03-07 RX ORDER — IBUPROFEN 600 MG/1
1 TABLET ORAL
Status: DISCONTINUED | OUTPATIENT
Start: 2023-03-07 | End: 2023-03-14 | Stop reason: HOSPADM

## 2023-03-07 RX ORDER — ONDANSETRON 2 MG/ML
4 INJECTION INTRAMUSCULAR; INTRAVENOUS EVERY 6 HOURS PRN
Status: DISCONTINUED | OUTPATIENT
Start: 2023-03-07 | End: 2023-03-14 | Stop reason: HOSPADM

## 2023-03-07 RX ORDER — ALBUTEROL SULFATE 90 UG/1
2 AEROSOL, METERED RESPIRATORY (INHALATION) EVERY 4 HOURS PRN
Status: DISCONTINUED | OUTPATIENT
Start: 2023-03-07 | End: 2023-03-14 | Stop reason: HOSPADM

## 2023-03-07 RX ORDER — ATORVASTATIN CALCIUM 20 MG/1
20 TABLET, FILM COATED ORAL NIGHTLY
Status: DISCONTINUED | OUTPATIENT
Start: 2023-03-07 | End: 2023-03-07

## 2023-03-07 RX ORDER — ACETAMINOPHEN 325 MG/1
650 TABLET ORAL EVERY 4 HOURS PRN
Status: DISCONTINUED | OUTPATIENT
Start: 2023-03-07 | End: 2023-03-14 | Stop reason: HOSPADM

## 2023-03-07 RX ORDER — QUETIAPINE FUMARATE 200 MG/1
200 TABLET, FILM COATED ORAL NIGHTLY
Status: DISCONTINUED | OUTPATIENT
Start: 2023-03-07 | End: 2023-03-07

## 2023-03-07 RX ORDER — LOSARTAN POTASSIUM 25 MG/1
25 TABLET ORAL DAILY
Status: DISCONTINUED | OUTPATIENT
Start: 2023-03-07 | End: 2023-03-07

## 2023-03-07 RX ORDER — NITROGLYCERIN 0.4 MG/1
0.4 TABLET SUBLINGUAL
Status: DISCONTINUED | OUTPATIENT
Start: 2023-03-07 | End: 2023-03-14 | Stop reason: HOSPADM

## 2023-03-07 RX ADMIN — ACETAMINOPHEN 1000 MG: 500 TABLET, FILM COATED ORAL at 14:04

## 2023-03-07 RX ADMIN — ENOXAPARIN SODIUM 40 MG: 100 INJECTION SUBCUTANEOUS at 18:42

## 2023-03-07 RX ADMIN — DIVALPROEX SODIUM 250 MG: 250 TABLET, EXTENDED RELEASE ORAL at 22:08

## 2023-03-07 RX ADMIN — SODIUM CHLORIDE 1000 ML: 9 INJECTION, SOLUTION INTRAVENOUS at 14:46

## 2023-03-07 RX ADMIN — CEFTRIAXONE SODIUM 1 G: 1 INJECTION, POWDER, FOR SOLUTION INTRAMUSCULAR; INTRAVENOUS at 14:03

## 2023-03-07 RX ADMIN — CEFTRIAXONE SODIUM 1 G: 1 INJECTION, POWDER, FOR SOLUTION INTRAMUSCULAR; INTRAVENOUS at 14:47

## 2023-03-07 RX ADMIN — MIRTAZAPINE 15 MG: 15 TABLET, FILM COATED ORAL at 22:08

## 2023-03-07 RX ADMIN — Medication 10 ML: at 20:40

## 2023-03-07 RX ADMIN — ACETAMINOPHEN 650 MG: 325 TABLET, FILM COATED ORAL at 20:50

## 2023-03-07 RX ADMIN — Medication 3 MG: at 20:40

## 2023-03-07 RX ADMIN — PANTOPRAZOLE SODIUM 40 MG: 40 TABLET, DELAYED RELEASE ORAL at 20:40

## 2023-03-07 RX ADMIN — SODIUM CHLORIDE 125 ML/HR: 9 INJECTION, SOLUTION INTRAVENOUS at 20:41

## 2023-03-07 NOTE — ED NOTES
"Nursing report ED to floor  Krupa Mcmanus  67 y.o.  female    HPI :   Chief Complaint   Patient presents with    Psychiatric Evaluation       Admitting doctor:   Alessio Trevizo MD    Admitting diagnosis:   The primary encounter diagnosis was Sepsis with encephalopathy without septic shock, due to unspecified organism (HCC). Diagnoses of Uncontrolled type 2 diabetes mellitus with hyperglycemia (HCC), Pneumonia of both lower lobes due to infectious organism, and Dementia with behavioral disturbance were also pertinent to this visit.    Code status:   Current Code Status       Date Active Code Status Order ID Comments User Context       Prior            Allergies:   Levofloxacin, Sulfamethoxazole, Amoxicillin, Benadryl [diphenhydramine], Ceclor [cefaclor], Penicillins, and Zyprexa [olanzapine]    Isolation:   No active isolations    Intake and Output    Intake/Output Summary (Last 24 hours) at 3/7/2023 1532  Last data filed at 3/7/2023 1448  Gross per 24 hour   Intake 100 ml   Output --   Net 100 ml       Weight:       03/07/23  1450   Weight: 77.1 kg (170 lb)       Most recent vitals:   Vitals:    03/07/23 1243 03/07/23 1426 03/07/23 1450 03/07/23 1526   BP:  106/73  125/80   Pulse: 100 100  92   Resp:       Temp:       SpO2: 94% 93%  94%   Weight:   77.1 kg (170 lb)    Height:   162.6 cm (64\")        Active LDAs/IV Access:   Lines, Drains & Airways       Active LDAs       Name Placement date Placement time Site Days    Peripheral IV 03/07/23 1400 Right Antecubital 03/07/23  1400  Antecubital  less than 1                    Labs (abnormal labs have a star):   Labs Reviewed   COMPREHENSIVE METABOLIC PANEL - Abnormal; Notable for the following components:       Result Value    Glucose 142 (*)     Alkaline Phosphatase 131 (*)     All other components within normal limits    Narrative:     GFR Normal >60  Chronic Kidney Disease <60  Kidney Failure <15     URINALYSIS W/ CULTURE IF INDICATED - Abnormal; Notable for the " following components:    Appearance, UA Cloudy (*)     Ketones, UA 15 mg/dL (1+) (*)     Protein, UA Trace (*)     All other components within normal limits    Narrative:     In absence of clinical symptoms, the presence of pyuria, bacteria, and/or nitrites on the urinalysis result does not correlate with infection.  Urine microscopic not indicated.   CBC WITH AUTO DIFFERENTIAL - Abnormal; Notable for the following components:    WBC 16.48 (*)     MCH 26.5 (*)     MCHC 30.8 (*)     Neutrophil % 84.3 (*)     Lymphocyte % 10.7 (*)     Monocyte % 4.4 (*)     Eosinophil % 0.0 (*)     Neutrophils, Absolute 13.88 (*)     Immature Grans, Absolute 0.06 (*)     All other components within normal limits   LACTIC ACID, PLASMA - Abnormal; Notable for the following components:    Lactate 3.8 (*)     All other components within normal limits   RESPIRATORY PANEL PCR W/ COVID-19 (SARS-COV-2) QASIM/CARLOS/PETE/PAD/COR/MAD/LIANA IN-HOUSE, NP SWAB IN [facility], 3-4 HR TAT - Normal    Narrative:     In the setting of a positive respiratory panel with a viral infection PLUS a negative procalcitonin without other underlying concern for bacterial infection, consider observing off antibiotics or discontinuation of antibiotics and continue supportive care. If the respiratory panel is positive for atypical bacterial infection (Bordetella pertussis, Chlamydophila pneumoniae, or Mycoplasma pneumoniae), consider antibiotic de-escalation to target atypical bacterial infection.   BLOOD CULTURE   BLOOD CULTURE   LACTIC ACID, REFLEX   CBC AND DIFFERENTIAL    Narrative:     The following orders were created for panel order CBC & Differential.  Procedure                               Abnormality         Status                     ---------                               -----------         ------                     CBC Auto Differential[[id number]]        Abnormal            Final result                 Please view results for these tests on the individual  orders.       EKG:   No orders to display       Meds given in ED:   Medications   sodium chloride 0.9 % flush 10 mL (has no administration in time range)   acetaminophen (TYLENOL) tablet 1,000 mg (1,000 mg Oral Given 3/7/23 1404)   cefTRIAXone (ROCEPHIN) 1 g in sodium chloride 0.9 % 100 mL IVPB-VTB (0 g Intravenous Stopped 3/7/23 1448)   cefTRIAXone (ROCEPHIN) 1 g in sodium chloride 0.9 % 100 mL IVPB-VTB (1 g Intravenous New Bag 3/7/23 1447)   sodium chloride 0.9 % bolus 1,000 mL (1,000 mL Intravenous New Bag 3/7/23 1446)       Imaging results:  CT Head Without Contrast    Result Date: 3/7/2023   No CT evidence for acute intracranial pathology. The etiology of the patient's mental status change is not further elucidated on this examination; and if further assessment is required, one could obtain an MRI of the brain for follow-up.  Again identified is prominent opacification of the left mastoid air cells which was also seen on the prior head CT dated 06/16/2022.   Radiation dose reduction techniques were utilized, including automated exposure control and exposure modulation based on body size.        Ambulatory status:   - Ax1 to turn, to [facility]    Social issues:   Social History     Socioeconomic History    Marital status:    Tobacco Use    Smoking status: Former   Substance and Sexual Activity    Alcohol use: Never       NIH Stroke Scale:         Vanesa Najera RN  03/07/23 15:32 JAGDEEP Alexis #[id number] with any questions

## 2023-03-07 NOTE — PLAN OF CARE
Goal Outcome Evaluation:               Pt admitted from ER with metabolic encephalopathy. VSS. Pt initially came over anxious/upset/yelling; pt did calm down after a few minutes; will occasionally yell out but is reassured quickly. No family at bedside. Lactic remains high; call out to MD.

## 2023-03-07 NOTE — CASE MANAGEMENT/SOCIAL WORK
"Discharge Planning Assessment  [facility]     Patient Name: Krupa Mcmanus  MRN: [id number]  Today's Date: 3/7/2023    Admit Date: 3/7/2023        Discharge Needs Assessment    No documentation.                Discharge Plan     Row Name 03/07/23 1720       Plan    Plan Comments Call received from spouse who advised he isnt interested in long term care for patient- reports he is the primary caregiver for patient and would be agreeable for her to be d/c to short term rehab- requests referrals to [facility] and [facility]. Referrals sent.    Row Name 03/07/23 1601       Plan    Plan Comments Brother Rosales Lanza returned call to advise patients spouse is \"no longer able to care for her.\" He noted that spouse is patients primary caregiver- noted that spouse is unable to keep patient clean secondary to incontinence and she continues to get UTI\"s where her health declines- she has had 4-5 UTI's already this year; Patient lives w/spouse in ground level apt. Patient requires assist w/all ADL's by spouse, uses a rolling walker for mobility. Verified PCP listed on facesheet; RX are filled at [facility] in [facility]; Brother is requesting that patient be moved to a long term Medicaid bed- Advised I will need to verify with patient and spouse d/c plans- In the meantime- advised brother of online RTR- [email or web address] to review for SNF.    Row Name 03/07/23 1552       Plan    Plan Comments Entered room, introduced self and explained role w/PPE in place on self; patient has removed her mask; patient is alert and oriented to self- is repetitvely verbalizing \"he took my phone and left his phone.\"- when asked who is she talking about she verbalized \"my , he left.\" Call placed to spouse w/pt permission- patient answered phone- advised her that she indeed has her own phone at this time; call placed to patients spouse- no answer, left message; call placed to brother listed Rosales Lanza. no answer, left " message.    Row Name 03/07/23 1527       Plan    Plan Comments Entered room, introduced self and explained role w/PPE in place on self;  patient has removed mask;              Continued Care and Services - Admitted Since 3/7/2023     Destination     Service Provider Request Status Selected Services Address Phone Fax Patient Preferred    [facility] Pending - Request Sent N/A [id number] [facility] [address] [phone] --    [facility] - Request Sent N[facility]A [id number] [facility] [address] [phone] --                 Demographic Summary    No documentation.                Functional Status    No documentation.                Psychosocial    No documentation.                Abuse/Neglect    No documentation.                Legal    No documentation.                Substance Abuse    No documentation.                Patient Forms    No documentation.                   Delaney Lafleur RN

## 2023-03-07 NOTE — DISCHARGE PLACEMENT REQUEST
"Ericka Rankin (67 y.o. Female)     Date of Birth   1955    Social Security Number       Address   [address] [place] [id number]    Home Phone   [phone]    MRN   [id number]       Christianity   Pres[employer]    Marital Status                               Admission Date   3/7/23    Admission Type   Emergency    Admitting Provider   Alessio Trevizo MD    Attending Provider   Alessio Trevizo MD    Department, Room/Bed   [facility] OBSERVATION, 132/1       Discharge Date       Discharge Disposition       Discharge Destination                               Attending Provider: Alessio Trevizo MD    Allergies: Levofloxacin, Sulfamethoxazole, Amoxicillin, Benadryl [Diphenhydramine], Ceclor [Cefaclor], Penicillins, Zyprexa [Olanzapine]    Isolation: None   Infection: None   Code Status: CPR    Ht: 162.6 cm (64\")   Wt: 77.1 kg (170 lb)    Admission Cmt: None   Principal Problem: Metabolic encephalopathy [G93.41]                 Active Insurance as of 3/7/2023     Primary Coverage     Payor Plan Insurance Group Employer/Plan Group    HUMANA MEDICARE REPLACEMENT HU[employer]NA MEDICARE REPLACEMENT [id number]     Payor Plan Address Payor Plan Phone Number Payor Plan Fax Number Effective Dates    [address] [address] [phone]  1/1/2023 - None Entered    [facility] [address]       Subscriber Name Subscriber Birth Date Member ID       ERICKA RANKIN 1955 [id number]           Secondary Coverage     Payor Plan Insurance Group Employer/Plan Group    [place]      Payor Plan Address Payor Plan Phone Number Payor Plan Fax Number Effective Dates    [address] [phone]  8/31/2021 - None Entered    [facility] [address]       Subscriber Name Subscriber Birth Date Member ID       ERICKA RANKIN 1955 [id number]                 Emergency Contacts      (Rel.) Home Phone Work Phone Mobile Phone    YonathanStone (Spouse) [phone] -- " [phone]    Rosales Lanza (Brother) -- -- [phone]

## 2023-03-07 NOTE — ED TRIAGE NOTES
Patient to ER via car from home for possible UTI (but family is poor historian and cant state symptoms, says she had it last time she acted like this) and depression and anxiety    Patient has dementia    Patient only compliant is wanting a milkshake    Patient wearing mask this RN in PPE

## 2023-03-07 NOTE — ED PROVIDER NOTES
EMERGENCY DEPARTMENT ENCOUNTER    Room Number:  10/10  Date of encounter:  3/7/2023  PCP: Akosua Staley APRN  Patient Care Team:  Akosua Staley APRN as PCP - General (Nurse Practitioner)   Independent Historians: Patient, family    HPI:  Chief Complaint: Altered mental status    A complete HPI/ROS/PMH/PSH/SH/FH are unobtainable due to: The patient and family are both very poor historians, patient has dementia    Chronic or social conditions impacting patient care (Social Determinants of Health): Unknown  (Financial Resource Strain / Food Insecurity / Transportation Needs / Physical Activity / Stress / Social Connections / Intimate Partner Violence / Housing Stability)    Context: Krupa Mcmanus is a 67 y.o. female who presents to the ED c/o having acute altered mental status.  Family reports that for the last several days she has had progressively worse altered mental status.  Family reports she has a history of dementia.  He reports that she has acted this way in the past when she has had a UTI.  He reports that she has a problem with anxiety.  They deny any injury or falls.  They are not able to provide any further history.    Review of prior external notes (non-ED): Discharge summary dated 8/31/2022 with an acute UTI and encephalopathy.  She has dementia with behavioral disturbance.  She was treated with IV Rocephin.    Review of prior external test results outside of this encounter: Laboratory evaluation 8/31/2022 shows a BMP with mildly elevated blood glucose of 120.  CBC shows chronic anemia with a hemoglobin of 9.5.    DIANNA reviewed by Alessio Trevizo MD, Rudy Dominguez MD       PAST MEDICAL HISTORY  Active Ambulatory Problems     Diagnosis Date Noted   • Affective psychosis, bipolar (HCC) 07/11/2018   • Essential hypertension 07/12/2018   • Mild intermittent asthma without complication 07/12/2018   • Hypokalemia 07/12/2018   • Leukocytosis 07/12/2018   • Elevated liver enzymes 07/12/2018   •  "Type 2 diabetes mellitus with hyperglycemia (HCC) 07/12/2018   • Acute UTI 08/18/2022   • Metabolic encephalopathy 08/19/2022   • Dementia with behavioral disturbance 08/19/2022   • Encephalopathy 08/29/2022     Resolved Ambulatory Problems     Diagnosis Date Noted   • No Resolved Ambulatory Problems     Past Medical History:   Diagnosis Date   • Alzheimer disease (HCC)    • Anxiety    • Bipolar 1 disorder (HCC)    • Dementia (HCC)    • Depression    • Diabetes mellitus (HCC)    • GERD (gastroesophageal reflux disease)    • OCD (obsessive compulsive disorder)    • Rheumatoid arthritis (HCC)    • UTI (urinary tract infection)        The patient has started, but not completed, their COVID-19 vaccination series.    PAST SURGICAL HISTORY  Past Surgical History:   Procedure Laterality Date   • CHOLECYSTECTOMY     • HYSTERECTOMY           FAMILY HISTORY  Family History   Problem Relation Age of Onset   • Cancer Father         unknown         SOCIAL HISTORY  Social History     Socioeconomic History   • Marital status:    Tobacco Use   • Smoking status: Former   Substance and Sexual Activity   • Alcohol use: Never         ALLERGIES  Levofloxacin, Sulfamethoxazole, Amoxicillin, Benadryl [diphenhydramine], Ceclor [cefaclor], Penicillins, and Zyprexa [olanzapine]        REVIEW OF SYSTEMS  Review of Systems   Not available due to dementia, altered mental status  All systems reviewed and negative except for those discussed in HPI.       PHYSICAL EXAM    I have reviewed the triage vital signs and nursing notes.    ED Triage Vitals   Temp Heart Rate Resp BP SpO2   03/07/23 1228 03/07/23 1228 03/07/23 1228 03/07/23 1240 03/07/23 1228   100.5 °F (38.1 °C) 114 18 128/95 99 %      Temp src Heart Rate Source Patient Position BP Location FiO2 (%)   -- -- -- -- --              Physical Exam  GENERAL: Awake, screaming \"I need a milkshake\", alert  SKIN: Warm, dry  HENT: Normocephalic, atraumatic  EYES: no scleral icterus  CV: " regular rhythm, regular rate  RESPIRATORY: normal effort, lungs clear  ABDOMEN: soft, nontender, nondistended  MUSCULOSKELETAL: no deformity  NEURO: alert, moves all extremities, follows commands          LAB RESULTS  Recent Results (from the past 24 hour(s))   Comprehensive Metabolic Panel    Collection Time: 03/07/23 12:46 PM    Specimen: Blood   Result Value Ref Range    Glucose 142 (H) 65 - 99 mg/dL    BUN 13 8 - 23 mg/dL    Creatinine 0.76 0.57 - 1.00 mg/dL    Sodium 139 136 - 145 mmol/L    Potassium 3.9 3.5 - 5.2 mmol/L    Chloride 103 98 - 107 mmol/L    CO2 24.3 22.0 - 29.0 mmol/L    Calcium 9.4 8.6 - 10.5 mg/dL    Total Protein 7.3 6.0 - 8.5 g/dL    Albumin 4.1 3.5 - 5.2 g/dL    ALT (SGPT) 25 1 - 33 U/L    AST (SGOT) 30 1 - 32 U/L    Alkaline Phosphatase 131 (H) 39 - 117 U/L    Total Bilirubin 0.7 0.0 - 1.2 mg/dL    Globulin 3.2 gm/dL    A/G Ratio 1.3 g/dL    BUN/Creatinine Ratio 17.1 7.0 - 25.0    Anion Gap 11.7 5.0 - 15.0 mmol/L    eGFR 84.9 >60.0 mL/min/1.73   CBC Auto Differential    Collection Time: 03/07/23 12:46 PM    Specimen: Blood   Result Value Ref Range    WBC 16.48 (H) 3.40 - 10.80 10*3/mm3    RBC 4.53 3.77 - 5.28 10*6/mm3    Hemoglobin 12.0 12.0 - 15.9 g/dL    Hematocrit 38.9 34.0 - 46.6 %    MCV 85.9 79.0 - 97.0 fL    MCH 26.5 (L) 26.6 - 33.0 pg    MCHC 30.8 (L) 31.5 - 35.7 g/dL    RDW 13.4 12.3 - 15.4 %    RDW-SD 41.7 37.0 - 54.0 fl    MPV 10.4 6.0 - 12.0 fL    Platelets 293 140 - 450 10*3/mm3    Neutrophil % 84.3 (H) 42.7 - 76.0 %    Lymphocyte % 10.7 (L) 19.6 - 45.3 %    Monocyte % 4.4 (L) 5.0 - 12.0 %    Eosinophil % 0.0 (L) 0.3 - 6.2 %    Basophil % 0.2 0.0 - 1.5 %    Immature Grans % 0.4 0.0 - 0.5 %    Neutrophils, Absolute 13.88 (H) 1.70 - 7.00 10*3/mm3    Lymphocytes, Absolute 1.77 0.70 - 3.10 10*3/mm3    Monocytes, Absolute 0.73 0.10 - 0.90 10*3/mm3    Eosinophils, Absolute 0.00 0.00 - 0.40 10*3/mm3    Basophils, Absolute 0.04 0.00 - 0.20 10*3/mm3    Immature Grans, Absolute 0.06 (H)  0.00 - 0.05 10*3/mm3    nRBC 0.0 0.0 - 0.2 /100 WBC   Urinalysis With Culture If Indicated - Urine, Clean Catch    Collection Time: 03/07/23 12:51 PM    Specimen: Urine, Clean Catch   Result Value Ref Range    Color, UA Yellow Yellow, Straw    Appearance, UA Cloudy (A) Clear    pH, UA 5.5 5.0 - 8.0    Specific Gravity, UA >=1.030 1.005 - 1.030    Glucose, UA Negative Negative    Ketones, UA 15 mg/dL (1+) (A) Negative    Bilirubin, UA Negative Negative    Blood, UA Negative Negative    Protein, UA Trace (A) Negative    Leuk Esterase, UA Negative Negative    Nitrite, UA Negative Negative    Urobilinogen, UA 1.0 E.U./dL 0.2 - 1.0 E.U./dL   Respiratory Panel PCR w/COVID-19(SARS-CoV-2) QASIM/CARLOS/PETE/PAD/COR/MAD/LIANA In-House, NP Swab in UTM/VTM, 3-4 HR TAT - Swab, Nasopharynx    Collection Time: 03/07/23  1:45 PM    Specimen: Nasopharynx; Swab   Result Value Ref Range    ADENOVIRUS, PCR Not Detected Not Detected    Coronavirus 229E Not Detected Not Detected    Coronavirus HKU1 Not Detected Not Detected    Coronavirus NL63 Not Detected Not Detected    Coronavirus OC43 Not Detected Not Detected    COVID19 Not Detected Not Detected - Ref. Range    Human Metapneumovirus Not Detected Not Detected    Human Rhinovirus/Enterovirus Not Detected Not Detected    Influenza A PCR Not Detected Not Detected    Influenza B PCR Not Detected Not Detected    Parainfluenza Virus 1 Not Detected Not Detected    Parainfluenza Virus 2 Not Detected Not Detected    Parainfluenza Virus 3 Not Detected Not Detected    Parainfluenza Virus 4 Not Detected Not Detected    RSV, PCR Not Detected Not Detected    Bordetella pertussis pcr Not Detected Not Detected    Bordetella parapertussis PCR Not Detected Not Detected    Chlamydophila pneumoniae PCR Not Detected Not Detected    Mycoplasma pneumo by PCR Not Detected Not Detected   Lactic Acid, Plasma    Collection Time: 03/07/23  1:46 PM    Specimen: Arm, Right; Blood   Result Value Ref Range    Lactate 3.8  (C) 0.5 - 2.0 mmol/L       Ordered the above labs and independently reviewed the results.        RADIOLOGY  CT Head Without Contrast    Result Date: 3/7/2023  CT HEAD WITHOUT CONTRAST  CLINICAL HISTORY: Mental status change.  TECHNIQUE: CT scan of the head was obtained with 3 mm axial soft tissue and 2 mm axial bone algorithm algorithm images. No intravenous contrast was administered. Sagittal and coronal reconstructions were obtained.  COMPARISON: CT head dated 06/16/2022.  FINDINGS:   There is no evidence for a calvarial fracture. There is no evidence for an acute extra-axial hemorrhage.  The ventricles, sulci, and cisterns are age-appropriate. The gray-white matter differentiation is within normal limits. The basal ganglia and thalami are unremarkable in appearance. The posterior fossa structures are within normal limits.  Incidental note is made of prominent opacification of the left mastoid air cells. Similar findings were seen on the prior head CT dated 06/16/2022.       No CT evidence for acute intracranial pathology. The etiology of the patient's mental status change is not further elucidated on this examination; and if further assessment is required, one could obtain an MRI of the brain for follow-up.  Again identified is prominent opacification of the left mastoid air cells which was also seen on the prior head CT dated 06/16/2022.   Radiation dose reduction techniques were utilized, including automated exposure control and exposure modulation based on body size.       XR Chest 1 View    Result Date: 3/7/2023  XR CHEST 1 VW-  Clinical: Acute mental status change  COMPARISON 8/18/2022, 6/9/2022, 5/13/2022 and 9/26/2021  FINDINGS: Interval development of a 13 mm nodule definitely calcified nodule within the left mid lung zone. There is a vague area of infiltrate seen within the left mid and lower lung zone, just inferior to this location. The right lung is clear. Cardiac size within normal limits. The  mediastinum is stable. There is a moderate size hiatal hernia again demonstrated.  CONCLUSION: 1. 13 mm nodular density left mid lung zone. I would recommend initial follow-up with PA and lateral chest when patient's condition permits. 2. Hiatal hernia. 3. Subtle patchy infiltrate left mid and lower lung zone.  This report was finalized on 3/7/2023 2:13 PM by Dr. Jassi Tidwell M.D.        I ordered the above noted radiological studies. Reviewed by me and discussed with radiologist.  See dictation for official radiology interpretation.      PROCEDURES    Critical Care  Performed by: Rudy Dominguez MD  Authorized by: Rudy Dominguez MD     Critical care provider statement:     Critical care time (minutes): 30-74.    Critical care time was exclusive of:  Separately billable procedures and treating other patients    Critical care was necessary to treat or prevent imminent or life-threatening deterioration of the following conditions:  Sepsis and CNS failure or compromise    Critical care was time spent personally by me on the following activities:  Development of treatment plan with patient or surrogate, discussions with consultants, evaluation of patient's response to treatment, examination of patient, obtaining history from patient or surrogate, ordering and performing treatments and interventions, ordering and review of laboratory studies, ordering and review of radiographic studies, pulse oximetry, re-evaluation of patient's condition and review of old charts          MEDICATIONS GIVEN IN ER    Medications   sodium chloride 0.9 % flush 10 mL (has no administration in time range)   acetaminophen (TYLENOL) tablet 1,000 mg (1,000 mg Oral Given 3/7/23 1404)   cefTRIAXone (ROCEPHIN) 1 g in sodium chloride 0.9 % 100 mL IVPB-VTB (0 g Intravenous Stopped 3/7/23 1448)   cefTRIAXone (ROCEPHIN) 1 g in sodium chloride 0.9 % 100 mL IVPB-VTB (1 g Intravenous New Bag 3/7/23 1447)   sodium chloride 0.9 % bolus 1,000 mL (1,000  mL Intravenous New Bag 3/7/23 1446)         ORDERS PLACED DURING THIS VISIT:  Orders Placed This Encounter   Procedures   • Critical Care   • Blood Culture - Blood,   • Blood Culture - Blood,   • Respiratory Panel PCR w/COVID-19(SARS-CoV-2) QASIM/CARLOS/PETE/PAD/COR/MAD/LIANA In-House, NP Swab in UTM/VTM, 3-4 HR TAT - Swab, Nasopharynx   • XR Chest 1 View   • CT Head Without Contrast   • Comprehensive Metabolic Panel   • Urinalysis With Culture If Indicated - Urine, Clean Catch   • CBC Auto Differential   • Lactic Acid, Plasma   • STAT Lactic Acid, Reflex   • Monitor Blood Pressure   • Cardiac Monitoring   • Pulse Oximetry, Continuous   • [facility] (on-call MD unless specified) Details   • Insert Peripheral IV   • Inpatient Admission   • CBC & Differential         PROGRESS, DATA ANALYSIS, CONSULTS, AND MEDICAL DECISION MAKING    All labs have been independently interpreted by me.  All radiology studies have been reviewed by me and discussed with radiologist dictating the report.   EKG's independently viewed and interpreted by me.  Discussion below represents my analysis of pertinent findings related to patient's condition, differential diagnosis, treatment plan and final disposition.    Differential diagnosis includes but is not limited to UTI, pneumonia, sepsis, intracranial hemorrhage.    ED Course as of 03/07/23 1518   Tue Mar 07, 2023   1244 The patient has a low-grade fever of 100.5.  She has a leukocytosis of 16,000.  Her chemistries show an elevated blood glucose but otherwise normal.  Her urine shows no evidence of infection.  Plan to obtain chest x-ray, CT of her head, respiratory viral panel, lactic acid and blood cultures and she will require admission to the hospital for her acute altered mental status and sepsis. [TR]   1355 XR Chest 1 View  My independent interpretation of the chest x-ray is left basilar infiltrate [TR]   1421 CT Head Without Contrast  My independent interpretation of the CT of the head is no acute  hemorrhage [TR]   1427 The patient sats are 94% on room air currently.  She has been as low as 90% on room air with good pleth.  She has an elevated lactic acid of 3.8.  I reviewed the work-up and findings with her at the bedside.  Answered all questions.  She is agreeable to admission. [TR]   1428 I discussed with CCP regarding the patient's living situation and discharge planning.  The patient's  also seems to have some dementia and I am not certain it is safe that they live together without supervision. [TR]   1442 I reviewed the work-up with the patient at the bedside.  Plan admission.  She is agreeable. [TR]   1502 Discussing with Dr. Roberts with neuroradiology.  CT head shows no acute process. [TR]   1517 Discussing with Dr. Trevizo with [facility].  Agrees to admit. [TR]      ED Course User Index  [TR] Rudy Dominguez MD       I interpreted the cardiac monitor rhythm and my independent interpretation is: normal sinus rhythm.     PPE: The patient wore a mask and I wore an N95 mask throughout the entire patient encounter.       AS OF 15:18 EST VITALS:    BP - 106/73  HR - 100  TEMP - 100.5 °F (38.1 °C)  O2 SATS - 93%        DIAGNOSIS  Final diagnoses:   Sepsis with encephalopathy without septic shock, due to unspecified organism (HCC)   Uncontrolled type 2 diabetes mellitus with hyperglycemia (HCC)   Pneumonia of both lower lobes due to infectious organism   Dementia with behavioral disturbance         DISPOSITION  ED Disposition     ED Disposition   Decision to Admit    Condition   --    Comment   Level of Care: Telemetry [5]   Diagnosis: Sepsis with encephalopathy without septic shock, due to unspecified organism (HCC) [[id number]]   Admitting Physician: ESVIN TREVIZO [[id number]]   Attending Physician: ESVIN TREVIZO [[id number]]   Certification: I Certify That Inpatient Hospital Services Are Medically Necessary For Greater Than 2 Midnights                  Note Disclaimer: At [facility], we believe that sharing information  builds trust and better relationships. You are receiving this note because you recently visited [facility]. It is possible you will see health information before a provider has talked with you about it. This kind of information can be easy to misunderstand. To help you fully understand what it means for your health, we urge you to discuss this note with your provider.       Rudy Dominguez MD  03/07/23 1561

## 2023-03-07 NOTE — CASE MANAGEMENT/SOCIAL WORK
"Discharge Planning Assessment  [facility]     Patient Name: Krupa Mcmanus  MRN: [id number]  Today's Date: 3/7/2023    Admit Date: 3/7/2023        Discharge Needs Assessment    No documentation.                Discharge Plan     Row Name 03/07/23 1601       Plan    Plan Comments Brother Rosales Lanza returned call to advise patients spouse is \"no longer able to care for her.\" He noted that spouse is patients primary caregiver- noted that spouse is unable to keep patient clean secondary to incontinence and she continues to get UTI\"s where her health declines- she has had 4-5 UTI's already this year; Patient lives w/spouse in ground level apt. Patient requires assist w/all ADL's by spouse, uses a rolling walker for mobility. Verified PCP listed on facesheet; RX are filled at [facility] in [facility]; Brother is requesting that patient be moved to a long term Medicaid bed- Advised I will need to verify with patient and spouse d/c plans- In the meantime- advised brother of online RTR- [email or web address] to review for SNF.    Row Name 03/07/23 1552       Plan    Plan Comments Entered room, introduced self and explained role w/PPE in place on self; patient has removed her mask; patient is alert and oriented to self- is repetitvely verbalizing \"he took my phone and left his phone.\"- when asked who is she talking about she verbalized \"my , he left.\" Call placed to spouse w/pt permission- patient answered phone- advised her that she indeed has her own phone at this time; call placed to patients spouse- no answer, left message; call placed to brother listed Rosales Lanza. no answer, left message.    Row Name 03/07/23 1527       Plan    Plan Comments Entered room, introduced self and explained role w/PPE in place on self;  patient has removed mask;              Continued Care and Services - Admitted Since 3/7/2023    Coordination has not been started for this encounter.          Demographic Summary    No " documentation.                Functional Status    No documentation.                Psychosocial    No documentation.                Abuse/Neglect    No documentation.                Legal    No documentation.                Substance Abuse    No documentation.                Patient Forms    No documentation.                   Delaney Lafleur RN

## 2023-03-07 NOTE — H&P
Internal medicine history and physical  INTERNAL MEDICINE   [facility]       Patient Identification:  Name: Krupa Mcmanus  Age: 67 y.o.  Sex: female  :  1955  MRN: [id number]                   Primary Care Physician: Akosua Staley APRN                               Date of admission:3/7/2023    Chief Complaint: Brought by family member for evaluation of increasing confusion and not eating and drinking much for the last several days.    History of Present Illness:   Patient is a 67-year-old female with early onset Alzheimer's disease, bipolar disorder anxiety and depression and obsessive-compulsive disorders with prior hospitalizations for altered mental status and had not had treatment for urinary tract infections lives with her  who himself is not the best of the historian who brought her today with several days history of progressive decline not acting herself and not eating and drinking.  Patient herself is unable to give much account of her symptoms except that she wanted to eat ice cream.  Family members are concerned that she may have a urinary tract infection as she has a similar decline few months ago and was hospitalized and was found to have UTI.  The details in terms of any recent changes in medications and a triggering factor and relieving and exacerbating factors are not obtainable.  Work-up in the emergency room showed low-grade temperature of 100.5 with tachycardia, leukocytosis, slightly elevated blood sugar normal urinalysis, elevated lactic acid level and chest x-ray evidence of possible patchy infiltrate in the left lung.  CT scan of the head did not show any acute intracranial process.  Patient received IV fluid and after blood cultures were drawn she received IV Rocephin and is being admitted for further care.      Past Medical History:  Past Medical History:   Diagnosis Date   • Alzheimer disease (HCC)    • Anxiety    • Bipolar 1 disorder (HCC)    •  Dementia (HCC)    • Depression    • Diabetes mellitus (HCC)    • GERD (gastroesophageal reflux disease)    • OCD (obsessive compulsive disorder)    • Rheumatoid arthritis (HCC)    • UTI (urinary tract infection)      Past Surgical History:  Past Surgical History:   Procedure Laterality Date   • CHOLECYSTECTOMY     • HYSTERECTOMY        Home Meds:  Medications Prior to Admission   Medication Sig Dispense Refill Last Dose   • acetaminophen (TYLENOL) 325 MG tablet Take 650 mg by mouth Every 4 (Four) Hours As Needed for Mild Pain .      • albuterol sulfate  (90 Base) MCG/ACT inhaler Inhale 2 puffs Every 4 (Four) Hours As Needed for Wheezing.      • ARIPiprazole (ABILIFY) 5 MG tablet Take 2.5 mg by mouth Daily.      • atorvastatin (LIPITOR) 20 MG tablet Take 20 mg by mouth Every Night.      • divalproex (DEPAKOTE ER) 250 MG 24 hr tablet Take 250 mg by mouth Every Night.      • donepezil (ARICEPT) 10 MG tablet Take 1 tablet by mouth Every Night. Indications: Alzheimer's Disease 30 tablet 0    • FLUoxetine (PROzac) 10 MG capsule Take 1 capsule by mouth Daily. (Patient taking differently: Take 80 mg by mouth Every Evening. Indications: Depression) 30 capsule 1    • fluticasone (FLONASE) 50 MCG/ACT nasal spray 2 sprays into the nostril(s) as directed by provider Daily.      • guaiFENesin (MUCINEX) 600 MG 12 hr tablet Take 1,200 mg by mouth 2 (Two) Times a Day As Needed for Cough or Congestion.      • loratadine (CLARITIN) 10 MG tablet Take 10 mg by mouth Daily.      • losartan (COZAAR) 25 MG tablet Take 1 tablet by mouth Daily. Indications: High Blood Pressure Disorder 30 tablet 0    • meclizine (ANTIVERT) 25 MG tablet Take 1 tablet by mouth 4 (Four) Times a Day As Needed for Dizziness. 20 tablet 0    • melatonin 1 MG tablet Take 3 mg by mouth At Night As Needed for Sleep.      • memantine (NAMENDA) 5 MG tablet Take 10 mg by mouth 2 (Two) Times a Day.      • metFORMIN (GLUCOPHAGE) 500 MG tablet Take 1 tablet by mouth  Daily With Breakfast. (Patient taking differently: Take 500 mg by mouth 2 (Two) Times a Day With Meals. Indications: Type 2 Diabetes) 30 tablet 0    • mirtazapine (REMERON) 15 MG tablet Take 15 mg by mouth Every Night.      • nystatin (MYCOSTATIN) 673158 UNIT/GM powder Apply  topically to the appropriate area as directed 3 (Three) Times a Day.      • ondansetron ODT (Zofran ODT) 4 MG disintegrating tablet Place 1 tablet on the tongue Every 8 (Eight) Hours As Needed for Nausea. 12 tablet 0    • oxybutynin (DITROPAN) 5 MG tablet Take 5 mg by mouth 2 (Two) Times a Day.      • pantoprazole (PROTONIX) 40 MG EC tablet Take 1 tablet by mouth Daily. Indications: Gastroesophageal Reflux Disease (Patient taking differently: Take 40 mg by mouth 2 (Two) Times a Day. Indications: Gastroesophageal Reflux Disease) 30 tablet 0    • polyethylene glycol (MIRALAX) 17 g packet Take 17 g by mouth Daily. (Patient taking differently: Take 17 g by mouth Daily As Needed.) 10 each 0    • QUEtiapine (SEROquel) 100 MG tablet Take 200 mg by mouth Every Night.      • Tiotropium Bromide Monohydrate (SPIRIVA RESPIMAT) 1.25 MCG/ACT aerosol solution inhaler Inhale 2 puffs Daily.      • venlafaxine (EFFEXOR) 37.5 MG tablet Take 37.5 mg by mouth 2 (Two) Times a Day.        Current Meds:     Current Facility-Administered Medications:   •  [COMPLETED] Insert Peripheral IV, , , Once **AND** sodium chloride 0.9 % flush 10 mL, 10 mL, Intravenous, PRN, Rudy Dominguez MD  Allergies:  Allergies   Allergen Reactions   • Levofloxacin Anaphylaxis   • Sulfamethoxazole Anaphylaxis   • Amoxicillin Rash   • Benadryl [Diphenhydramine] Rash   • Ceclor [Cefaclor] Rash     Tolerated Rocephin 08/2022   • Penicillins Rash     Unknown     • Zyprexa [Olanzapine] Rash     Social History:   Social History     Tobacco Use   • Smoking status: Former   • Smokeless tobacco: Not on file   Substance Use Topics   • Alcohol use: Never      Family History:  Family History   Problem  "Relation Age of Onset   • Cancer Father         unknown          Review of Systems  See history of present illness and past medical history.    Could not be obtained in detail from the patient.    Vitals:   /80   Pulse 92   Temp 100.5 °F (38.1 °C)   Resp 18   Ht 162.6 cm (64\")   Wt 77.1 kg (170 lb)   LMP 07/11/2018 Comment: postmenopausal  SpO2 94%   BMI 29.18 kg/m²   I/O:     Intake/Output Summary (Last 24 hours) at 3/7/2023 1639  Last data filed at 3/7/2023 1600  Gross per 24 hour   Intake 200 ml   Output --   Net 200 ml     Exam:  Patient is examined using the personal protective equipment as per guidelines from infection control for this particular patient as enacted.  Hand washing was performed before and after patient interaction.  General Appearance:   Alert nontoxic-appearing female who does not appear to be in any acute distress   Head:    Normocephalic, without obvious abnormality, atraumatic   Eyes:    PERRL, conjunctiva/corneas clear, EOM's intact, both eyes   Ears:    Normal external ear canals, both ears   Nose:   Nares normal, septum midline, mucosa normal, no drainage    or sinus tenderness   Throat:   Lips, tongue, gums normal; oral mucosa pink and dry   Neck:   Supple, symmetrical, trachea midline, no adenopathy;     thyroid:  no enlargement/tenderness/nodules; no carotid    bruit or JVD   Back:     Symmetric, no curvature, ROM normal, no CVA tenderness   Lungs:    No obvious use of accessory muscles of breathing noted bilateral air entry decreased breath sounds at the left lung base   Chest Wall:    No tenderness or deformity    Heart:   S1-S2 regular 2/6 systolic murmur noted   Abdomen:    Soft nontender no guarding rigidity or rebound noted   Extremities:   Extremities normal, atraumatic, no cyanosis or edema   Pulses:   Pulses palpable in all extremities; symmetric all extremities   Skin:   Skin color normal, Skin is warm and dry,  no rashes or palpable lesions   Neurologic:  " Grossly nonfocal       Data Review:      I reviewed the patient's new clinical results.  Results from last 7 days   Lab Units 03/07/23  1246   WBC 10*3/mm3 16.48*   HEMOGLOBIN g/dL 12.0   PLATELETS 10*3/mm3 293     Results from last 7 days   Lab Units 03/07/23  1246   SODIUM mmol/L 139   POTASSIUM mmol/L 3.9   CHLORIDE mmol/L 103   CO2 mmol/L 24.3   BUN mg/dL 13   CREATININE mg/dL 0.76   CALCIUM mg/dL 9.4   GLUCOSE mg/dL 142*     CT Head Without Contrast    Result Date: 3/7/2023   No CT evidence for acute intracranial pathology. The etiology of the patient's mental status change is not further elucidated on this examination; and if further assessment is required, one could obtain an MRI of the brain for follow-up.  Again identified is prominent opacification of the left mastoid air cells which was also seen on the prior head CT dated 06/16/2022.   Radiation dose reduction techniques were utilized, including automated exposure control and exposure modulation based on body size.  This report was finalized on 3/7/2023 4:04 PM by Dr. Bhavin Roberts M.D.    XR CHEST 1 VW-       Clinical: Acute mental status change       COMPARISON 8/18/2022, 6/9/2022, 5/13/2022 and 9/26/2021       FINDINGS: Interval development of a 13 mm nodule definitely calcified   nodule within the left mid lung zone. There is a vague area of   infiltrate seen within the left mid and lower lung zone, just inferior   to this location. The right lung is clear. Cardiac size within normal   limits. The mediastinum is stable. There is a moderate size hiatal   hernia again demonstrated.       CONCLUSION:   1. 13 mm nodular density left mid lung zone. I would recommend initial   follow-up with PA and lateral chest when patient's condition permits.   2. Hiatal hernia.   3. Subtle patchy infiltrate left mid and lower lung zone.     Assessment:  Active Hospital Problems    Diagnosis  POA   • **Metabolic encephalopathy [G93.41]  Yes   • Pneumonia [J18.9]   Unknown   • Social problem [Z65.9]  Not Applicable   • Dementia with behavioral disturbance [F03.918]  Yes   • Type 2 diabetes mellitus with hyperglycemia (HCC) [E11.65]  Yes   • Essential hypertension [I10]  Yes   • Leukocytosis [D72.829]  Yes   Lung nodule    Medical decision making/care plan: See admitting orders  · Altered mental status with decline from her baseline and not eating and drinking in the setting of underlying dementia with work-up revealing leukocytosis and lung infiltrate and elevated lactic acid level with low-grade temperature-this is likely represent metabolic encephalopathy secondary to pneumonia.  Plan is to admit the patient continue with IV Rocephin follow-up on blood cultures.  Continue with IV fluids and follow serial lactic acid level.  · Type 2 diabetes with hyperglycemia and lactic acid level elevated with patient's medication profile suggesting use of metformin that could further contribute to persistent lactic acid level.  Plan is to hold metformin continue with IV fluids and Accu-Cheks, check hemoglobin A1c and sliding scale coverage and watch for hypoglycemia.  · Dementia with risk of sundowning-monitor and provide her with fall precautions and consider access evaluation while continuing her Remeron and Depakote.  · Hypertension-patient blood pressure is within normal limits and has elevated lactic acid level.  Hold her home antihypertensives continue with IV fluids and monitor.  · Difficult psychosocial situation-Case management consultation and placement after hospital discharge to ensure proper care going forward.  · Lung nodule in left midlung zone-May require out of the hospital repeat imaging studies.  · Hiatal hernia-monitor for aspiration    Alessio Trevizo MD   3/7/2023  16:39 EST    Parts of this note may be an electronic transcription/translation of spoken language to printed text using the Dragon dictation system.

## 2023-03-08 LAB
D-LACTATE SERPL-SCNC: 2 MMOL/L (ref 0.5–2)
D-LACTATE SERPL-SCNC: 2.1 MMOL/L (ref 0.5–2)
GLUCOSE BLDC GLUCOMTR-MCNC: 107 MG/DL (ref 70–130)
GLUCOSE BLDC GLUCOMTR-MCNC: 127 MG/DL (ref 70–130)
GLUCOSE BLDC GLUCOMTR-MCNC: 138 MG/DL (ref 70–130)
GLUCOSE BLDC GLUCOMTR-MCNC: 94 MG/DL (ref 70–130)

## 2023-03-08 PROCEDURE — 25010000002 CEFTRIAXONE PER 250 MG: Performed by: INTERNAL MEDICINE

## 2023-03-08 PROCEDURE — 82962 GLUCOSE BLOOD TEST: CPT

## 2023-03-08 PROCEDURE — 83605 ASSAY OF LACTIC ACID: CPT | Performed by: EMERGENCY MEDICINE

## 2023-03-08 PROCEDURE — 25010000002 ENOXAPARIN PER 10 MG: Performed by: INTERNAL MEDICINE

## 2023-03-08 RX ADMIN — SODIUM CHLORIDE 125 ML/HR: 9 INJECTION, SOLUTION INTRAVENOUS at 05:56

## 2023-03-08 RX ADMIN — Medication 10 ML: at 21:11

## 2023-03-08 RX ADMIN — Medication 10 ML: at 08:02

## 2023-03-08 RX ADMIN — ACETAMINOPHEN 650 MG: 325 TABLET, FILM COATED ORAL at 21:13

## 2023-03-08 RX ADMIN — MIRTAZAPINE 15 MG: 15 TABLET, FILM COATED ORAL at 21:11

## 2023-03-08 RX ADMIN — ENOXAPARIN SODIUM 40 MG: 100 INJECTION SUBCUTANEOUS at 08:02

## 2023-03-08 RX ADMIN — Medication 3 MG: at 21:12

## 2023-03-08 RX ADMIN — PANTOPRAZOLE SODIUM 40 MG: 40 TABLET, DELAYED RELEASE ORAL at 08:02

## 2023-03-08 RX ADMIN — CEFTRIAXONE SODIUM 1 G: 1 INJECTION, POWDER, FOR SOLUTION INTRAMUSCULAR; INTRAVENOUS at 15:36

## 2023-03-08 RX ADMIN — DIVALPROEX SODIUM 250 MG: 250 TABLET, EXTENDED RELEASE ORAL at 21:11

## 2023-03-08 RX ADMIN — SODIUM CHLORIDE 500 ML: 9 INJECTION, SOLUTION INTRAVENOUS at 01:35

## 2023-03-08 RX ADMIN — PANTOPRAZOLE SODIUM 40 MG: 40 TABLET, DELAYED RELEASE ORAL at 21:11

## 2023-03-08 NOTE — PLAN OF CARE
Goal Outcome Evaluation: Pt resting in bed with no signs of distress noted at this time. VS stable. Bed in lowest position with siderails up X2. Call light within reach. RN will continue to monitor.

## 2023-03-08 NOTE — CASE MANAGEMENT/SOCIAL WORK
Continued Stay Note  [facility]     Patient Name: Krupa Mcmanus  MRN: [id number]  Today's Date: 3/8/2023    Admit Date: 3/7/2023    Plan: SNF referrals pending   Discharge Plan     Row Name 03/08/23 0941       Plan    Plan SNF referrals pending    Patient/Family in Agreement with Plan yes    Plan Comments CCP spoke with Evangelist regarding referral to [facility]. Per Evangelist, at this time no beds at [facility]. Katja/Madi plans on calling family to discuss bed at [facility] and will follow-up with CCP after. CCP spoke with Sofie Bal who states she will evaluate to see if they can accept. CCP continues to follow pending SNF referrals. Lexie NEWTON LCSW               Discharge Codes    No documentation.                     Lexie Riggs

## 2023-03-08 NOTE — CASE MANAGEMENT/SOCIAL WORK
Continued Stay Note  [facility]     Patient Name: Krupa Mcmanus  MRN: [id number]  Today's Date: 3/8/2023    Admit Date: 3/7/2023    Plan: [facility] accepted - awaiting PT eval   Discharge Plan     Row Name 03/08/23 1525       Plan    Plan [facility] accepted - awaiting PT eval    Patient/Family in Agreement with Plan yes    Plan Comments Incoming call from patient's spouse over the phone regarding discharge planning. Spouse states he is unsure at this time whether or not patient will need SNF at discharge. Spouse is agreeable to [facility]. [facility] on [facility]. Per request of patient's spouse, CCP spoke with patient's brother and provided updates on discharge plan. PT eval is still pending. Patient will need [employer] MCR precert to be initiated closer to dc. STEVE CURTIS               Discharge Codes    No documentation.                     STEVE Lezaam

## 2023-03-08 NOTE — PROGRESS NOTES
Name: Krupa Mcmanus ADMIT: 3/7/2023   : 1955  PCP: Akosua Staley APRN    MRN: [id number] LOS: 1 days   AGE/SEX: 67 y.o. female  ROOM: 132/1     Subjective   Subjective     Patient is lying in the bed and is in no major distress.  She is awake alert and oriented x3 today.  Denies nausea, vomiting abdominal pain, chest pain.       Objective   Objective   Vital Signs  Temp:  [97.1 °F (36.2 °C)-100.5 °F (38.1 °C)] 98.5 °F (36.9 °C)  Heart Rate:  [] 64  Resp:  [16-20] 18  BP: (106-133)/(68-95) 128/83  SpO2:  [93 %-100 %] 100 %  on   ;   Device (Oxygen Therapy): room air  Body mass index is 29.18 kg/m².  Physical Exam   HEENT: PERRLA, extraocular is intact, Sclerus no icterus  Neck: Supple, no JVD  Cardiovascular: Regular rate and rhythm with normal S1-S2  Respiratory: Distant breath sounds, no wheezes  GI: Obese, soft, nontender, bowel sounds are present  Extremities: No edema, palpable pedal pulses  Neurologic: Grossly nonfocal, no facial asymmetry  Results Review     I reviewed the patient's new clinical results.  Results from last 7 days   Lab Units 23  1246   WBC 10*3/mm3 16.48*   HEMOGLOBIN g/dL 12.0   PLATELETS 10*3/mm3 293     Results from last 7 days   Lab Units 23  1246   SODIUM mmol/L 139   POTASSIUM mmol/L 3.9   CHLORIDE mmol/L 103   CO2 mmol/L 24.3   BUN mg/dL 13   CREATININE mg/dL 0.76   GLUCOSE mg/dL 142*   EGFR mL/min/1.73 84.9     Results from last 7 days   Lab Units 23  1246   ALBUMIN g/dL 4.1   BILIRUBIN mg/dL 0.7   ALK PHOS U/L 131*   AST (SGOT) U/L 30   ALT (SGPT) U/L 25     Results from last 7 days   Lab Units 23  1246   CALCIUM mg/dL 9.4   ALBUMIN g/dL 4.1     Results from last 7 days   Lab Units 23  0423 23  2149 23  1857 23  1703   LACTATE mmol/L 2.1* 3.0* 2.5* 3.8*     Glucose   Date/Time Value Ref Range Status   2023 0553 127 70 - 130 mg/dL Final     Comment:     Meter: [id number] : [id number] Trevor LEES    03/07/2023 2031 110 70 - 130 mg/dL Final     Comment:     Meter: [id number] : [id number] Malick LEES   03/07/2023 1646 125 70 - 130 mg/dL Final     Comment:     Meter: [id number] : [id number] Malick LEES       CT Head Without Contrast    Result Date: 3/7/2023   No CT evidence for acute intracranial pathology. The etiology of the patient's mental status change is not further elucidated on this examination; and if further assessment is required, one could obtain an MRI of the brain for follow-up.  Again identified is prominent opacification of the left mastoid air cells which was also seen on the prior head CT dated 06/16/2022.   Radiation dose reduction techniques were utilized, including automated exposure control and exposure modulation based on body size.  This report was finalized on 3/7/2023 4:04 PM by Dr. Bhavin Roberts M.D.      I have personally reviewed all medications:  Scheduled Medications  cefTRIAXone, 1 g, Intravenous, Q24H  divalproex, 250 mg, Oral, Nightly  enoxaparin, 40 mg, Subcutaneous, Daily  insulin lispro, 0-9 Units, Subcutaneous, TID AC  mirtazapine, 15 mg, Oral, Nightly  pantoprazole, 40 mg, Oral, BID  sodium chloride, 10 mL, Intravenous, Q12H    Infusions  sodium chloride, 125 mL/hr, Last Rate: 125 mL/hr (03/08/23 0556)    Diet  Diet: Cardiac Diets, Diabetic Diets, Renal Diets; Healthy Heart (2-3 Na+); Consistent Carbohydrate; Low Sodium (2-3g), Low Potassium, Low Phosphorus; Texture: Regular Texture (IDDSI 7); Fluid Consistency: Thin (IDDSI 0)    I have personally reviewed:  [x]  Laboratory   [x]  Microbiology   [x]  Radiology   [x]  EKG/Telemetry  [x]  Cardiology/Vascular   [x]  Pathology    [x]  Records       Assessment/Plan     Active Hospital Problems    Diagnosis  POA   • **Metabolic encephalopathy [G93.41]  Yes   • Pneumonia [J18.9]  Unknown   • Social problem [Z65.9]  Not Applicable   • Dementia with behavioral disturbance [F03.918]  Yes   • Type 2 diabetes  mellitus with hyperglycemia (HCC) [E11.65]  Yes   • Essential hypertension [I10]  Yes   • Leukocytosis [D72.829]  Yes      Resolved Hospital Problems   No resolved problems to display.     #1 acute metabolic encephalopathy most likely secondary to underlying pneumonia.  Patient mentions is returned to baseline and does not have any focal findings.  CT brain with no acute intracranial findings.  2.  Pneumonia, lactate is improving and repeat CBC in the morning.  On Rocephin which will be continued.  3.  Diabetes mellitus, on corrective dose insulin.  4.  GERD, on acid suppressive therapy.  5.  On Lovenox for DVT prophylaxis.  6.  CODE STATUS is full code.      Jerardo Magaña MD  [facility]  03/08/23  11:29 EST

## 2023-03-08 NOTE — PLAN OF CARE
Goal Outcome Evaluation:  Plan of Care Reviewed With: patient        Progress: no change  Outcome Evaluation: pt is alert and oriented, forgetful/short term memory loss, room air, no falls, bed alarm on, lactic elevated, pt received 500cc bolus, NS @ 125, medicated PRN with tylenol for generalized pain, IV antibiotic

## 2023-03-09 LAB
ANION GAP SERPL CALCULATED.3IONS-SCNC: 9.9 MMOL/L (ref 5–15)
BASOPHILS # BLD AUTO: 0.04 10*3/MM3 (ref 0–0.2)
BASOPHILS NFR BLD AUTO: 0.4 % (ref 0–1.5)
BUN SERPL-MCNC: 5 MG/DL (ref 8–23)
BUN/CREAT SERPL: 10 (ref 7–25)
CALCIUM SPEC-SCNC: 8.5 MG/DL (ref 8.6–10.5)
CHLORIDE SERPL-SCNC: 108 MMOL/L (ref 98–107)
CO2 SERPL-SCNC: 24.1 MMOL/L (ref 22–29)
CREAT SERPL-MCNC: 0.5 MG/DL (ref 0.57–1)
DEPRECATED RDW RBC AUTO: 40.8 FL (ref 37–54)
EGFRCR SERPLBLD CKD-EPI 2021: 101.7 ML/MIN/1.73
EOSINOPHIL # BLD AUTO: 0.19 10*3/MM3 (ref 0–0.4)
EOSINOPHIL NFR BLD AUTO: 1.8 % (ref 0.3–6.2)
ERYTHROCYTE [DISTWIDTH] IN BLOOD BY AUTOMATED COUNT: 13.1 % (ref 12.3–15.4)
GLUCOSE BLDC GLUCOMTR-MCNC: 117 MG/DL (ref 70–130)
GLUCOSE BLDC GLUCOMTR-MCNC: 126 MG/DL (ref 70–130)
GLUCOSE BLDC GLUCOMTR-MCNC: 129 MG/DL (ref 70–130)
GLUCOSE BLDC GLUCOMTR-MCNC: 148 MG/DL (ref 70–130)
GLUCOSE SERPL-MCNC: 107 MG/DL (ref 65–99)
HCT VFR BLD AUTO: 35.2 % (ref 34–46.6)
HGB BLD-MCNC: 10.9 G/DL (ref 12–15.9)
IMM GRANULOCYTES # BLD AUTO: 0.03 10*3/MM3 (ref 0–0.05)
IMM GRANULOCYTES NFR BLD AUTO: 0.3 % (ref 0–0.5)
LYMPHOCYTES # BLD AUTO: 1.68 10*3/MM3 (ref 0.7–3.1)
LYMPHOCYTES NFR BLD AUTO: 16 % (ref 19.6–45.3)
MCH RBC QN AUTO: 26.9 PG (ref 26.6–33)
MCHC RBC AUTO-ENTMCNC: 31 G/DL (ref 31.5–35.7)
MCV RBC AUTO: 86.9 FL (ref 79–97)
MONOCYTES # BLD AUTO: 0.61 10*3/MM3 (ref 0.1–0.9)
MONOCYTES NFR BLD AUTO: 5.8 % (ref 5–12)
NEUTROPHILS NFR BLD AUTO: 7.95 10*3/MM3 (ref 1.7–7)
NEUTROPHILS NFR BLD AUTO: 75.7 % (ref 42.7–76)
NRBC BLD AUTO-RTO: 0 /100 WBC (ref 0–0.2)
PLATELET # BLD AUTO: 234 10*3/MM3 (ref 140–450)
PMV BLD AUTO: 10.5 FL (ref 6–12)
POTASSIUM SERPL-SCNC: 3.5 MMOL/L (ref 3.5–5.2)
RBC # BLD AUTO: 4.05 10*6/MM3 (ref 3.77–5.28)
SODIUM SERPL-SCNC: 142 MMOL/L (ref 136–145)
WBC NRBC COR # BLD: 10.5 10*3/MM3 (ref 3.4–10.8)

## 2023-03-09 PROCEDURE — 85025 COMPLETE CBC W/AUTO DIFF WBC: CPT | Performed by: INTERNAL MEDICINE

## 2023-03-09 PROCEDURE — 25010000002 ENOXAPARIN PER 10 MG: Performed by: INTERNAL MEDICINE

## 2023-03-09 PROCEDURE — 82962 GLUCOSE BLOOD TEST: CPT

## 2023-03-09 PROCEDURE — 25010000002 HALOPERIDOL LACTATE PER 5 MG: Performed by: INTERNAL MEDICINE

## 2023-03-09 PROCEDURE — 80048 BASIC METABOLIC PNL TOTAL CA: CPT | Performed by: INTERNAL MEDICINE

## 2023-03-09 PROCEDURE — 25010000002 CEFTRIAXONE PER 250 MG: Performed by: INTERNAL MEDICINE

## 2023-03-09 RX ORDER — HALOPERIDOL 5 MG/ML
2 INJECTION INTRAMUSCULAR EVERY 6 HOURS PRN
Status: DISCONTINUED | OUTPATIENT
Start: 2023-03-09 | End: 2023-03-14 | Stop reason: HOSPADM

## 2023-03-09 RX ADMIN — SODIUM CHLORIDE 125 ML/HR: 9 INJECTION, SOLUTION INTRAVENOUS at 12:19

## 2023-03-09 RX ADMIN — MIRTAZAPINE 15 MG: 15 TABLET, FILM COATED ORAL at 22:41

## 2023-03-09 RX ADMIN — Medication 10 ML: at 22:00

## 2023-03-09 RX ADMIN — HALOPERIDOL LACTATE 2 MG: 5 INJECTION, SOLUTION INTRAMUSCULAR at 13:39

## 2023-03-09 RX ADMIN — SODIUM CHLORIDE 125 ML/HR: 9 INJECTION, SOLUTION INTRAVENOUS at 04:28

## 2023-03-09 RX ADMIN — ENOXAPARIN SODIUM 40 MG: 100 INJECTION SUBCUTANEOUS at 08:32

## 2023-03-09 RX ADMIN — CEFTRIAXONE SODIUM 1 G: 1 INJECTION, POWDER, FOR SOLUTION INTRAMUSCULAR; INTRAVENOUS at 14:57

## 2023-03-09 RX ADMIN — PANTOPRAZOLE SODIUM 40 MG: 40 TABLET, DELAYED RELEASE ORAL at 22:41

## 2023-03-09 RX ADMIN — PANTOPRAZOLE SODIUM 40 MG: 40 TABLET, DELAYED RELEASE ORAL at 08:32

## 2023-03-09 RX ADMIN — DIVALPROEX SODIUM 250 MG: 250 TABLET, EXTENDED RELEASE ORAL at 22:41

## 2023-03-09 RX ADMIN — Medication 10 ML: at 08:32

## 2023-03-09 NOTE — PLAN OF CARE
Goal Outcome Evaluation:  Plan of Care Reviewed With: patient        Progress: no change  Outcome Evaluation: patient alert to self and hospital overnight-answers orientation questions appropriately, however is very forgetful and anxious, frequently asking the same questions repeatedly and yelling out for nurse. IVF infusing per order. turn q2hr. purewick in place. tylenol prn pain x1. rested quietly for a few hours. +bm this shift. frequent redirection and reassurance provided.

## 2023-03-09 NOTE — PLAN OF CARE
Goal Outcome Evaluation:  Plan of Care Reviewed With: patient        Progress: no change  Outcome Evaluation: vss, a&Ox4 based on questions but very forgetful, calling out repeatedly for help, voiding per purewiasa, IVF and abx as ordered, BM this shift, plan to dc to SNF when medically stable, educated on glucose meds and monitoring

## 2023-03-09 NOTE — PROGRESS NOTES
Name: Krupa Mcmanus ADMIT: 3/7/2023   : 1955  PCP: Akosua Staley APRN    MRN: [id number] LOS: 2 days   AGE/SEX: 67 y.o. female  ROOM: [facility]     Subjective   Subjective     Patient is lying in the bed and is in no major distress.  She is awake alert and oriented x3 today.  Denies nausea, vomiting abdominal pain, chest pain.  According to  she does get intermittently confused.       Objective   Objective   Vital Signs  Temp:  [97.6 °F (36.4 °C)-98.7 °F (37.1 °C)] 97.6 °F (36.4 °C)  Heart Rate:  [66-80] 80  Resp:  [18] 18  BP: (138-170)/() 148/83  SpO2:  [90 %-97 %] 95 %  on   ;   Device (Oxygen Therapy): room air  Body mass index is 29.18 kg/m².  Physical Exam   HEENT: PERRLA, extraocular is intact, Sclerus no icterus  Neck: Supple, no JVD  Cardiovascular: Regular rate and rhythm with normal S1-S2  Respiratory: Distant breath sounds, no wheezes  GI: Obese, soft, nontender, bowel sounds are present  Extremities: No edema, palpable pedal pulses  Neurologic: Grossly nonfocal, no facial asymmetry  Results Review     I reviewed the patient's new clinical results.  Results from last 7 days   Lab Units 23  0332 23  1246   WBC 10*3/mm3 10.50 16.48*   HEMOGLOBIN g/dL 10.9* 12.0   PLATELETS 10*3/mm3 234 293     Results from last 7 days   Lab Units 23  0332 23  1246   SODIUM mmol/L 142 139   POTASSIUM mmol/L 3.5 3.9   CHLORIDE mmol/L 108* 103   CO2 mmol/L 24.1 24.3   BUN mg/dL 5* 13   CREATININE mg/dL 0.50* 0.76   GLUCOSE mg/dL 107* 142*   EGFR mL/min/1.73 101.7 84.9     Results from last 7 days   Lab Units 23  1246   ALBUMIN g/dL 4.1   BILIRUBIN mg/dL 0.7   ALK PHOS U/L 131*   AST (SGOT) U/L 30   ALT (SGPT) U/L 25     Results from last 7 days   Lab Units 23  0332 23  1246   CALCIUM mg/dL 8.5* 9.4   ALBUMIN g/dL  --  4.1     Results from last 7 days   Lab Units 23  1109 23  0423 23  2149 23  1857   LACTATE mmol/L 2.0 2.1* 3.0*  2.5*     Glucose   Date/Time Value Ref Range Status   03/09/2023 1106 117 70 - 130 mg/dL Final     Comment:     Meter: [id number] : [id number] Sarah Huston NA   03/09/2023 0646 126 70 - 130 mg/dL Final     Comment:     Meter: [id number] : [id number] Margie Mcdonald NA   03/08/2023 2108 94 70 - 130 mg/dL Final     Comment:     Meter: [id number] : [id number] Nicola Cox NA   03/08/2023 1604 138 (H) 70 - 130 mg/dL Final     Comment:     Meter: [id number] : [id number] Melina Maya NA   03/08/2023 1134 107 70 - 130 mg/dL Final     Comment:     Meter: [id number] : [id number] Merna Roy NA   03/08/2023 0553 127 70 - 130 mg/dL Final     Comment:     Meter: [id number] : [id number] Trevor Pabon NA   03/07/2023 2031 110 70 - 130 mg/dL Final     Comment:     Meter: [id number] : [id number] Malick LEES       CT Head Without Contrast    Result Date: 3/7/2023   No CT evidence for acute intracranial pathology. The etiology of the patient's mental status change is not further elucidated on this examination; and if further assessment is required, one could obtain an MRI of the brain for follow-up.  Again identified is prominent opacification of the left mastoid air cells which was also seen on the prior head CT dated 06/16/2022.   Radiation dose reduction techniques were utilized, including automated exposure control and exposure modulation based on body size.  This report was finalized on 3/7/2023 4:04 PM by Dr. Bhavin Roberts M.D.      I have personally reviewed all medications:  Scheduled Medications  cefTRIAXone, 1 g, Intravenous, Q24H  divalproex, 250 mg, Oral, Nightly  enoxaparin, 40 mg, Subcutaneous, Daily  insulin lispro, 0-9 Units, Subcutaneous, TID AC  mirtazapine, 15 mg, Oral, Nightly  pantoprazole, 40 mg, Oral, BID  sodium chloride, 10 mL, Intravenous, Q12H    Infusions  sodium chloride, 125 mL/hr, Last Rate: 125 mL/hr (03/09/23 1219)    Diet  Diet: Cardiac Diets, Diabetic Diets,  Renal Diets; Healthy Heart (2-3 Na+); Consistent Carbohydrate; Low Sodium (2-3g), Low Potassium, Low Phosphorus; Texture: Regular Texture (IDDSI 7); Fluid Consistency: Thin (IDDSI 0)    I have personally reviewed:  [x]  Laboratory   [x]  Microbiology   [x]  Radiology   [x]  EKG/Telemetry  [x]  Cardiology/Vascular   [x]  Pathology    [x]  Records       Assessment/Plan     Active Hospital Problems    Diagnosis  POA   • **Metabolic encephalopathy [G93.41]  Yes   • Pneumonia [J18.9]  Unknown   • Social problem [Z65.9]  Not Applicable   • Dementia with behavioral disturbance [F03.918]  Yes   • Type 2 diabetes mellitus with hyperglycemia (HCC) [E11.65]  Yes   • Essential hypertension [I10]  Yes   • Leukocytosis [D72.829]  Yes      Resolved Hospital Problems   No resolved problems to display.     1. Acute metabolic encephalopathy most likely secondary to underlying pneumonia.  Patient's mental status is close to baseline by according to discussion with  she does appears to have undiagnosed mental health disorder therefore psychiatry consult will be obtained as well.    2.  Pneumonia, lactate is improving and repeat CBC in the morning.  On Rocephin which will be continued.    3.  Diabetes mellitus, on corrective dose insulin.    4.  GERD, on acid suppressive therapy.    5.  Generalized weakness, PT/OT did evaluate and recommended rehab placement.  Will discharge once a bed is available.    6.  On Lovenox for DVT prophylaxis.    7.  CODE STATUS is full code.    8.  Estimated discharge date, next 1 to 2 days.    Copied text on this note has been reviewed by me on 3/9/2023      Jerardo Magaña MD  [facility]  03/09/23  13:48 EST

## 2023-03-10 LAB
ANION GAP SERPL CALCULATED.3IONS-SCNC: 14 MMOL/L (ref 5–15)
BASOPHILS # BLD AUTO: 0.04 10*3/MM3 (ref 0–0.2)
BASOPHILS NFR BLD AUTO: 0.4 % (ref 0–1.5)
BUN SERPL-MCNC: 5 MG/DL (ref 8–23)
BUN/CREAT SERPL: 10.6 (ref 7–25)
CALCIUM SPEC-SCNC: 8.7 MG/DL (ref 8.6–10.5)
CHLORIDE SERPL-SCNC: 104 MMOL/L (ref 98–107)
CO2 SERPL-SCNC: 23 MMOL/L (ref 22–29)
CREAT SERPL-MCNC: 0.47 MG/DL (ref 0.57–1)
DEPRECATED RDW RBC AUTO: 41.9 FL (ref 37–54)
EGFRCR SERPLBLD CKD-EPI 2021: 103.2 ML/MIN/1.73
EOSINOPHIL # BLD AUTO: 0.09 10*3/MM3 (ref 0–0.4)
EOSINOPHIL NFR BLD AUTO: 0.8 % (ref 0.3–6.2)
ERYTHROCYTE [DISTWIDTH] IN BLOOD BY AUTOMATED COUNT: 13.4 % (ref 12.3–15.4)
GLUCOSE BLDC GLUCOMTR-MCNC: 114 MG/DL (ref 70–130)
GLUCOSE BLDC GLUCOMTR-MCNC: 115 MG/DL (ref 70–130)
GLUCOSE BLDC GLUCOMTR-MCNC: 136 MG/DL (ref 70–130)
GLUCOSE BLDC GLUCOMTR-MCNC: 144 MG/DL (ref 70–130)
GLUCOSE SERPL-MCNC: 115 MG/DL (ref 65–99)
HCT VFR BLD AUTO: 35.5 % (ref 34–46.6)
HGB BLD-MCNC: 11.4 G/DL (ref 12–15.9)
IMM GRANULOCYTES # BLD AUTO: 0.03 10*3/MM3 (ref 0–0.05)
IMM GRANULOCYTES NFR BLD AUTO: 0.3 % (ref 0–0.5)
LYMPHOCYTES # BLD AUTO: 1.9 10*3/MM3 (ref 0.7–3.1)
LYMPHOCYTES NFR BLD AUTO: 17 % (ref 19.6–45.3)
MCH RBC QN AUTO: 27.3 PG (ref 26.6–33)
MCHC RBC AUTO-ENTMCNC: 32.1 G/DL (ref 31.5–35.7)
MCV RBC AUTO: 85.1 FL (ref 79–97)
MONOCYTES # BLD AUTO: 0.59 10*3/MM3 (ref 0.1–0.9)
MONOCYTES NFR BLD AUTO: 5.3 % (ref 5–12)
NEUTROPHILS NFR BLD AUTO: 76.2 % (ref 42.7–76)
NEUTROPHILS NFR BLD AUTO: 8.53 10*3/MM3 (ref 1.7–7)
NRBC BLD AUTO-RTO: 0 /100 WBC (ref 0–0.2)
PLATELET # BLD AUTO: 239 10*3/MM3 (ref 140–450)
PMV BLD AUTO: 10.6 FL (ref 6–12)
POTASSIUM SERPL-SCNC: 3.4 MMOL/L (ref 3.5–5.2)
RBC # BLD AUTO: 4.17 10*6/MM3 (ref 3.77–5.28)
SODIUM SERPL-SCNC: 141 MMOL/L (ref 136–145)
WBC NRBC COR # BLD: 11.18 10*3/MM3 (ref 3.4–10.8)

## 2023-03-10 PROCEDURE — 82962 GLUCOSE BLOOD TEST: CPT

## 2023-03-10 PROCEDURE — 85025 COMPLETE CBC W/AUTO DIFF WBC: CPT | Performed by: INTERNAL MEDICINE

## 2023-03-10 PROCEDURE — 25010000002 CEFTRIAXONE PER 250 MG: Performed by: INTERNAL MEDICINE

## 2023-03-10 PROCEDURE — 80048 BASIC METABOLIC PNL TOTAL CA: CPT | Performed by: INTERNAL MEDICINE

## 2023-03-10 PROCEDURE — 97161 PT EVAL LOW COMPLEX 20 MIN: CPT

## 2023-03-10 PROCEDURE — 97530 THERAPEUTIC ACTIVITIES: CPT

## 2023-03-10 PROCEDURE — 25010000002 ENOXAPARIN PER 10 MG: Performed by: INTERNAL MEDICINE

## 2023-03-10 RX ORDER — ARIPIPRAZOLE 5 MG/1
5 TABLET ORAL DAILY
Status: DISCONTINUED | OUTPATIENT
Start: 2023-03-10 | End: 2023-03-14 | Stop reason: HOSPADM

## 2023-03-10 RX ORDER — DIVALPROEX SODIUM 500 MG/1
500 TABLET, EXTENDED RELEASE ORAL NIGHTLY
Status: DISCONTINUED | OUTPATIENT
Start: 2023-03-10 | End: 2023-03-14 | Stop reason: HOSPADM

## 2023-03-10 RX ORDER — HALOPERIDOL 5 MG/ML
2 INJECTION INTRAMUSCULAR EVERY 6 HOURS PRN
Status: DISCONTINUED | OUTPATIENT
Start: 2023-03-10 | End: 2023-03-14 | Stop reason: HOSPADM

## 2023-03-10 RX ADMIN — SODIUM CHLORIDE 125 ML/HR: 9 INJECTION, SOLUTION INTRAVENOUS at 12:18

## 2023-03-10 RX ADMIN — ENOXAPARIN SODIUM 40 MG: 100 INJECTION SUBCUTANEOUS at 08:24

## 2023-03-10 RX ADMIN — CEFTRIAXONE SODIUM 1 G: 1 INJECTION, POWDER, FOR SOLUTION INTRAMUSCULAR; INTRAVENOUS at 14:27

## 2023-03-10 RX ADMIN — Medication 10 ML: at 21:00

## 2023-03-10 RX ADMIN — PANTOPRAZOLE SODIUM 40 MG: 40 TABLET, DELAYED RELEASE ORAL at 08:24

## 2023-03-10 RX ADMIN — Medication 10 ML: at 08:24

## 2023-03-10 RX ADMIN — Medication 3 MG: at 19:47

## 2023-03-10 RX ADMIN — PANTOPRAZOLE SODIUM 40 MG: 40 TABLET, DELAYED RELEASE ORAL at 19:47

## 2023-03-10 RX ADMIN — ARIPIPRAZOLE 5 MG: 5 TABLET ORAL at 14:28

## 2023-03-10 RX ADMIN — MIRTAZAPINE 15 MG: 15 TABLET, FILM COATED ORAL at 19:47

## 2023-03-10 RX ADMIN — DIVALPROEX SODIUM 500 MG: 500 TABLET, FILM COATED, EXTENDED RELEASE ORAL at 19:47

## 2023-03-10 NOTE — PROGRESS NOTES
Name: Krupa Mcmanus ADMIT: 3/7/2023   : 1955  PCP: Akosua Staley APRN    MRN: [id number] LOS: 3 days   AGE/SEX: 67 y.o. female  ROOM: [facility]     Subjective   Subjective     Patient is lying in the bed and is in no major distress.  She is awake alert and oriented x3 today.  Denies nausea, vomiting abdominal pain, chest pain.         Objective   Objective   Vital Signs  Temp:  [97.5 °F (36.4 °C)-98.6 °F (37 °C)] 98.6 °F (37 °C)  Heart Rate:  [76-90] 81  Resp:  [16-18] 18  BP: (123-156)/(73-91) 149/73  SpO2:  [93 %-96 %] 93 %  on   ;   Device (Oxygen Therapy): room air  Body mass index is 29.18 kg/m².  Physical Exam   HEENT: PERRLA, extraocular is intact, Sclerus no icterus  Neck: Supple, no JVD  Cardiovascular: Regular rate and rhythm with normal S1-S2  Respiratory: Distant breath sounds, no wheezes  GI: Obese, soft, nontender, bowel sounds are present  Extremities: No edema, palpable pedal pulses  Neurologic: Grossly nonfocal, no facial asymmetry  Results Review     I reviewed the patient's new clinical results.  Results from last 7 days   Lab Units 03/10/23  0523  0332 23  1246   WBC 10*3/mm3 11.18* 10.50 16.48*   HEMOGLOBIN g/dL 11.4* 10.9* 12.0   PLATELETS 10*3/mm3 239 234 293     Results from last 7 days   Lab Units 03/10/23  0523  0332 23  1246   SODIUM mmol/L 141 142 139   POTASSIUM mmol/L 3.4* 3.5 3.9   CHLORIDE mmol/L 104 108* 103   CO2 mmol/L 23.0 24.1 24.3   BUN mg/dL 5* 5* 13   CREATININE mg/dL 0.47* 0.50* 0.76   GLUCOSE mg/dL 115* 107* 142*   EGFR mL/min/1.73 103.2 101.7 84.9     Results from last 7 days   Lab Units 23  1246   ALBUMIN g/dL 4.1   BILIRUBIN mg/dL 0.7   ALK PHOS U/L 131*   AST (SGOT) U/L 30   ALT (SGPT) U/L 25     Results from last 7 days   Lab Units 03/10/23  0512 23  0332 23  1246   CALCIUM mg/dL 8.7 8.5* 9.4   ALBUMIN g/dL  --   --  4.1     Results from last 7 days   Lab Units 23  1109 23  0423 23  2147  03/07/23  1857   LACTATE mmol/L 2.0 2.1* 3.0* 2.5*     Glucose   Date/Time Value Ref Range Status   03/10/2023 1112 136 (H) 70 - 130 mg/dL Final     Comment:     Meter: [id number] : [id number] Bala Paula NA   03/10/2023 0547 115 70 - 130 mg/dL Final     Comment:     Meter: [id number] : [id number] Silvio Murrella NA   03/09/2023 2059 129 70 - 130 mg/dL Final     Comment:     Meter: [id number] : [id number] Silvio Daxa NA   03/09/2023 1608 148 (H) 70 - 130 mg/dL Final     Comment:     Meter: [id number] : [id number] Jerman Briscoe NA   03/09/2023 1106 117 70 - 130 mg/dL Final     Comment:     Meter: [id number] : [id number] Sarah Huston NA   03/09/2023 0646 126 70 - 130 mg/dL Final     Comment:     Meter: [id number] : [id number] Margie Mcdonald NA   03/08/2023 2108 94 70 - 130 mg/dL Final     Comment:     Meter: [id number] : [id number] Nicola Cox NA       No radiology results for the last day  I have personally reviewed all medications:  Scheduled Medications  ARIPiprazole, 5 mg, Oral, Daily  cefTRIAXone, 1 g, Intravenous, Q24H  divalproex, 500 mg, Oral, Nightly  enoxaparin, 40 mg, Subcutaneous, Daily  insulin lispro, 0-9 Units, Subcutaneous, TID AC  mirtazapine, 15 mg, Oral, Nightly  pantoprazole, 40 mg, Oral, BID  sodium chloride, 10 mL, Intravenous, Q12H    Infusions  sodium chloride, 125 mL/hr, Last Rate: 125 mL/hr (03/10/23 1218)    Diet  Diet: Cardiac Diets, Diabetic Diets, Renal Diets; Healthy Heart (2-3 Na+); Consistent Carbohydrate; Low Sodium (2-3g), Low Potassium, Low Phosphorus; Texture: Regular Texture (IDDSI 7); Fluid Consistency: Thin (IDDSI 0)    I have personally reviewed:  [x]  Laboratory   [x]  Microbiology   [x]  Radiology   [x]  EKG/Telemetry  [x]  Cardiology/Vascular   [x]  Pathology    [x]  Records       Assessment/Plan     Active Hospital Problems    Diagnosis  POA   • **Metabolic encephalopathy [G93.41]  Yes   • Pneumonia [J18.9]  Unknown   • Social problem  [Z65.9]  Not Applicable   • Dementia with behavioral disturbance [F03.918]  Yes   • Type 2 diabetes mellitus with hyperglycemia (HCC) [E11.65]  Yes   • Essential hypertension [I10]  Yes   • Leukocytosis [D72.829]  Yes      Resolved Hospital Problems   No resolved problems to display.     1. Acute metabolic encephalopathy most likely secondary to underlying pneumonia/UTI.  Mental status is now close to baseline.  Urine cultures grew E. coli and will await for final sensitivities and currently patient is on IV Rocephin.    2.  Pneumonia, lactate is improving and repeat CBC in the morning.  On Rocephin which will be continued.  Respiratory viral panel was negative.    3.  Diabetes mellitus, on corrective dose insulin.    4.  GERD, on acid suppressive therapy.    5.  Generalized weakness, PT/OT did evaluate and recommended rehab placement.  Will discharge once a bed is available.    6.  Agitation with known history of bipolar, patient has been initiated on Depakote and Abilify that she did used to take in the past was reinitiated by psychiatry.    7.  On Lovenox for DVT prophylaxis.    8.  CODE STATUS is full code.    9.  Estimated discharge date, anytime to rehab once a bed is available.    Copied text on this note has been reviewed by me on 3/10/2023      Jerardo Magaña MD  [facility]  03/10/23  14:08 EST

## 2023-03-10 NOTE — PROGRESS NOTES
Continued Stay Note  [facility]     Patient Name: Krupa Mcmanus  MRN: [id number]  Today's Date: 3/10/2023    Admit Date: 3/7/2023    Plan: [facility] accepted - awaiting PT eval   Discharge Plan     Row Name 03/10/23 1607       Plan    Plan Comments [facility] following. Patient is medically unstable for d/c at this time. Plan remains to d/c to SNF when stable. Patient will need EMS transport. Please f/u with [facility] to confirm bed at d/c.               Discharge Codes    No documentation.               Expected Discharge Date and Time     Expected Discharge Date Expected Discharge Time    Mar 13, 2023             Susannah Roy RN

## 2023-03-10 NOTE — PLAN OF CARE
Goal Outcome Evaluation:               Pt admitted on 3/7 with a diagnosis of altered mental status and metabolic encephalopathy. Pt has a history of Alzheimers and dementia. No physical behaviors noted, but pt constantly screams out for the nurse. Pt has the Pure WIck in place, Voiding function intact. Psych consult pending. Pt educated on the importance of monitoring blood sugars related to comorbidity of DM. Pt voiced understanding. Pt resting at this time, will continue to monitor.

## 2023-03-10 NOTE — THERAPY EVALUATION
Patient Name: Krupa Mcmanus  : 1955    MRN: [id number]                              Today's Date: 3/10/2023       Admit Date: 3/7/2023    Visit Dx:     ICD-10-CM ICD-9-CM   1. Sepsis with encephalopathy without septic shock, due to unspecified organism (HCC)  A41.9 038.9    R65.20 995.91    G93.40 348.30   2. Uncontrolled type 2 diabetes mellitus with hyperglycemia (HCC)  E11.65 250.02   3. Pneumonia of both lower lobes due to infectious organism  J18.9 486   4. Dementia with behavioral disturbance  F03.918 294.21     Patient Active Problem List   Diagnosis   • Affective psychosis, bipolar (HCC)   • Essential hypertension   • Mild intermittent asthma without complication   • Hypokalemia   • Leukocytosis   • Elevated liver enzymes   • Type 2 diabetes mellitus with hyperglycemia (HCC)   • Acute UTI   • Metabolic encephalopathy   • Dementia with behavioral disturbance   • Encephalopathy   • Sepsis with encephalopathy without septic shock, due to unspecified organism (HCC)   • Pneumonia   • Social problem     Past Medical History:   Diagnosis Date   • Alzheimer disease (HCC)    • Anxiety    • Bipolar 1 disorder (HCC)    • Dementia (HCC)    • Depression    • Diabetes mellitus (HCC)    • GERD (gastroesophageal reflux disease)    • OCD (obsessive compulsive disorder)    • Rheumatoid arthritis (HCC)    • UTI (urinary tract infection)      Past Surgical History:   Procedure Laterality Date   • CHOLECYSTECTOMY     • HYSTERECTOMY        General Information     Row Name 03/10/23 0938          Physical Therapy Time and Intention    Document Type evaluation  -SM     Mode of Treatment individual therapy;physical therapy  -     Row Name 03/10/23 0938          General Information    Patient Profile Reviewed yes  -SM     Prior Level of Function min assist:  -SM     Existing Precautions/Restrictions fall  -SM     Barriers to Rehab previous functional deficit;cognitive status  -     Row Name 03/10/23 0938           Living Environment    People in Home spouse  -     Row Name 03/10/23 0938          Cognition    Orientation Status (Cognition) oriented x 3;disoriented to;situation  -     Row Name 03/10/23 0938          Safety Issues, Functional Mobility    Safety Issues Affecting Function (Mobility) ability to follow commands;judgment;impulsivity;awareness of need for assistance;at risk behavior observed;positioning of assistive device;insight into deficits/self-awareness;problem-solving;safety precaution awareness;safety precautions follow-through/compliance  -     Impairments Affecting Function (Mobility) balance;cognition;strength;endurance/activity tolerance  -     Cognitive Impairments, Mobility Safety/Performance attention;awareness, need for assistance;impulsivity;insight into deficits/self-awareness;judgment;problem-solving/reasoning;safety precaution awareness;safety precaution follow-through;sequencing abilities  -           User Key  (r) = Recorded By, (t) = Taken By, (c) = Cosigned By    Initials Name Provider Type     Josey Turcios PT Physical Therapist               Mobility     Row Name 03/10/23 0939          Bed Mobility    Bed Mobility supine-sit;sit-supine  -     Supine-Sit Tillman (Bed Mobility) contact guard  -     Sit-Supine Tillman (Bed Mobility) contact guard  -     Assistive Device (Bed Mobility) head of bed elevated;bed rails  -     Row Name 03/10/23 0939          Sit-Stand Transfer    Sit-Stand Tillman (Transfers) contact guard  -     Assistive Device (Sit-Stand Transfers) walker, front-wheeled  -     Row Name 03/10/23 0939          Gait/Stairs (Locomotion)    Tillman Level (Gait) contact guard;minimum assist (75% patient effort)  -     Assistive Device (Gait) walker, front-wheeled  -     Distance in Feet (Gait) 25ft  -     Deviations/Abnormal Patterns (Gait) sarah decreased;gait speed decreased  -     Comment, (Gait/Stairs) Gait slow but mostly  steady. Patient requires VCs to keep rwx close and tactile cues for rwx guidance.  -           User Key  (r) = Recorded By, (t) = Taken By, (c) = Cosigned By    Initials Name Provider Type     Josey Turcios PT Physical Therapist               Obj/Interventions     Row Name 03/10/23 0940          Range of Motion Comprehensive    General Range of Motion bilateral lower extremity ROM WFL  -     Row Name 03/10/23 0940          Strength Comprehensive (MMT)    General Manual Muscle Testing (MMT) Assessment lower extremity strength deficits identified  -     Comment, General Manual Muscle Testing (MMT) Assessment Generalized LE weakness. Unable to formally assess due to patient confusion  -     Row Name 03/10/23 0940          Balance    Balance Assessment sitting static balance;sitting dynamic balance;sit to stand dynamic balance;standing static balance;standing dynamic balance  -     Static Sitting Balance contact guard  -     Dynamic Sitting Balance contact guard  -     Position, Sitting Balance sitting edge of bed  -     Sit to Stand Dynamic Balance contact guard  -     Static Standing Balance contact guard  -     Dynamic Standing Balance contact guard;minimal assist  -     Position/Device Used, Standing Balance supported;walker, front-wheeled  -     Balance Interventions sitting;standing;sit to stand;supported;static;dynamic  -           User Key  (r) = Recorded By, (t) = Taken By, (c) = Cosigned By    Initials Name Provider Type    Josey Jameson PT Physical Therapist               Goals/Plan     Row Name 03/10/23 0944          Bed Mobility Goal 1 (PT)    Activity/Assistive Device (Bed Mobility Goal 1, PT) bed mobility activities, all  -     Steedman Level/Cues Needed (Bed Mobility Goal 1, PT) modified independence  -     Time Frame (Bed Mobility Goal 1, PT) 1 week  -     Row Name 03/10/23 0944          Transfer Goal 1 (PT)    Activity/Assistive Device (Transfer Goal  1, PT) sit-to-stand/stand-to-sit;bed-to-chair/chair-to-bed  -SM     Amador Level/Cues Needed (Transfer Goal 1, PT) standby assist  -SM     Time Frame (Transfer Goal 1, PT) 1 week  -SM     Row Name 03/10/23 0944          Gait Training Goal 1 (PT)    Activity/Assistive Device (Gait Training Goal 1, PT) gait (walking locomotion);walker, rolling  -SM     Amador Level (Gait Training Goal 1, PT) standby assist  -SM     Distance (Gait Training Goal 1, PT) 150ft  -SM     Time Frame (Gait Training Goal 1, PT) 1 week  -SM           User Key  (r) = Recorded By, (t) = Taken By, (c) = Cosigned By    Initials Name Provider Type    Josey Jameson, PT Physical Therapist               Clinical Impression     Row Name 03/10/23 0905          Pain    Pretreatment Pain Rating 0/10 - no pain  -SM     Posttreatment Pain Rating 0/10 - no pain  -SM     Row Name 03/10/23 0956          Plan of Care Review    Plan of Care Reviewed With patient  -     Outcome Evaluation Patient is a 67 y.o female who presents to [facility] with AMS. Patient answers all orientation questions correctly but appears very confused and forgetful throughout session. Patient lives at home with her spouse who helps her with ADLs. Patient uses a rwx for ambulation at baseline. Patient completed all bed mobility with CGA. Patient performed STS from EOB and chair with CGA. Patient ambulated 25ft in room with rwx and CGA-[place]. Gait slow but mostly steady. Patient requires VCs to keep rwx close and tactile cues for rwx guidance.  Patient stood at sink to perform self care needs with [place]. Patient required frequent cues throughout session for redirection and safety. Patient supine in bed at end of session. Patient would continue to benefit from skilled PT intervention to address deficits in functional mobility. PT recommends SNF at d/c. PT will continue to monitor.  -     Row Name 03/10/23 0923          Therapy Assessment/Plan (PT)    Rehab Potential (PT) good,  to achieve stated therapy goals  -     Criteria for Skilled Interventions Met (PT) yes  -SM     Therapy Frequency (PT) 6 times/wk  -     Row Name 03/10/23 0940          Vital Signs    O2 Delivery Pre Treatment room air  -SM     O2 Delivery Intra Treatment room air  -SM     O2 Delivery Post Treatment room air  -SM     Pre Patient Position Supine  -SM     Intra Patient Position Standing  -SM     Post Patient Position Supine  -SM     Row Name 03/10/23 0940          Positioning and Restraints    Pre-Treatment Position in bed  -SM     Post Treatment Position bed  -SM     In Bed notified nsg;call light within reach;encouraged to call for assist;exit alarm on;supine  -SM           User Key  (r) = Recorded By, (t) = Taken By, (c) = Cosigned By    Initials Name Provider Type    Josey Jameson PT Physical Therapist               Outcome Measures     Row Name 03/10/23 0945 03/10/23 0800       How much help from another person do you currently need...    Turning from your back to your side while in flat bed without using bedrails? 3  -SM 3  -CC    Moving from lying on back to sitting on the side of a flat bed without bedrails? 3  -SM 3  -CC    Moving to and from a bed to a chair (including a wheelchair)? 3  -SM 3  -CC    Standing up from a chair using your arms (e.g., wheelchair, bedside chair)? 3  -SM 3  -CC    Climbing 3-5 steps with a railing? 2  -SM 2  -CC    To walk in hospital room? 3  -SM 3  -CC    AM-PAC 6 Clicks Score (PT) 17  -SM 17  -CC    Highest level of mobility 5 --> Static standing  -SM 5 --> Static standing  -CC    Row Name 03/10/23 0945          Functional Assessment    Outcome Measure Options AM-PAC 6 Clicks Basic Mobility (PT)  -           User Key  (r) = Recorded By, (t) = Taken By, (c) = Cosigned By    Initials Name Provider Type    Josey Jameson PT Physical Therapist    Chico Lawrence, RN Registered Nurse                             Physical Therapy Education     Title: PT OT  SLP Therapies (In Progress)     Topic: Physical Therapy (In Progress)     Point: Mobility training (In Progress)     Learning Progress Summary           Patient Acceptance, E, NR by  at 3/10/2023 0945                   Point: Home exercise program (Not Started)     Learner Progress:  Not documented in this visit.          Point: Body mechanics (In Progress)     Learning Progress Summary           Patient Acceptance, E, NR by  at 3/10/2023 0945                   Point: Precautions (In Progress)     Learning Progress Summary           Patient Acceptance, E, NR by  at 3/10/2023 0945                               User Key     Initials Effective Dates Name Provider Type Discipline     05/02/22 -  Josey Turcios, ANASTACIA Physical Therapist PT              PT Recommendation and Plan     Plan of Care Reviewed With: patient  Outcome Evaluation: Patient is a 67 y.o female who presents to [facility] with AMS. Patient answers all orientation questions correctly but appears very confused and forgetful throughout session. Patient lives at home with her spouse who helps her with ADLs. Patient uses a rwx for ambulation at baseline. Patient completed all bed mobility with CGA. Patient performed STS from EOB and chair with CGA. Patient ambulated 25ft in room with rwx and CGA-[place]. Gait slow but mostly steady. Patient requires VCs to keep rwx close and tactile cues for rwx guidance.  Patient stood at sink to perform self care needs with [place]. Patient required frequent cues throughout session for redirection and safety. Patient supine in bed at end of session. Patient would continue to benefit from skilled PT intervention to address deficits in functional mobility. PT recommends SNF at d/c. PT will continue to monitor.     Time Calculation:    PT Charges     Row Name 03/10/23 0946             Time Calculation    Start Time 0917 -SM      Stop Time 0934 -SM      Time Calculation (min) 17 min  -SM      PT Received On 03/10/23  -       PT - Next Appointment 03/11/23  -      PT Goal Re-Cert Due Date 03/17/23  -SM         Time Calculation- PT    Total Timed Code Minutes- PT 10 minute(s)  -SM         Timed Charges    97541 - PT Therapeutic Activity Minutes 10  -SM         Total Minutes    Timed Charges Total Minutes 10  -SM       Total Minutes 10  -SM            User Key  (r) = Recorded By, (t) = Taken By, (c) = Cosigned By    Initials Name Provider Type     Josey Turcios, ANASTACIA Physical Therapist              Therapy Charges for Today     Code Description Service Date Service Provider Modifiers Qty    12984222471  PT THERAPEUTIC ACT EA 15 MIN 3/10/2023 Josey Turcios, PT GP 1    99798121438 HC PT EVAL LOW COMPLEXITY 3 3/10/2023 Josey Turcios, PT GP 1          PT G-Codes  Outcome Measure Options: AM-PAC 6 Clicks Basic Mobility (PT)  AM-PAC 6 Clicks Score (PT): 17  PT Discharge Summary  Anticipated Discharge Disposition (PT): skilled nursing facility  Patient was not wearing a face mask during this therapy encounter. Therapist used appropriate personal protective equipment including mask and gloves.  Mask used was standard procedure mask. Appropriate PPE was worn during the entire therapy session. Hand hygiene was completed before and after therapy session. Patient is not in enhanced droplet precautions.     Josey Turcios PT  3/10/2023

## 2023-03-10 NOTE — PLAN OF CARE
Goal Outcome Evaluation:  Plan of Care Reviewed With: patient           Outcome Evaluation: Patient is a 67 y.o female who presents to [facility] with AMS. Patient answers all orientation questions correctly but appears very confused and forgetful throughout session. Patient lives at home with her spouse who helps her with ADLs. Patient uses a rwx for ambulation at baseline. Patient completed all bed mobility with CGA. Patient performed STS from EOB and chair with CGA. Patient ambulated 25ft in room with rwx and CGA-[place]. Gait slow but mostly steady. Patient requires VCs to keep rwx close and tactile cues for rwx guidance.  Patient stood at sink to perform self care needs with [place]. Patient required frequent cues throughout session for redirection and safety. Patient supine in bed at end of session. Patient would continue to benefit from skilled PT intervention to address deficits in functional mobility. PT recommends SNF at d/c. PT will continue to monitor.

## 2023-03-11 LAB
ANION GAP SERPL CALCULATED.3IONS-SCNC: 13.9 MMOL/L (ref 5–15)
BASOPHILS # BLD AUTO: 0.06 10*3/MM3 (ref 0–0.2)
BASOPHILS NFR BLD AUTO: 0.7 % (ref 0–1.5)
BUN SERPL-MCNC: 7 MG/DL (ref 8–23)
BUN/CREAT SERPL: 13.5 (ref 7–25)
CALCIUM SPEC-SCNC: 8.9 MG/DL (ref 8.6–10.5)
CHLORIDE SERPL-SCNC: 106 MMOL/L (ref 98–107)
CO2 SERPL-SCNC: 22.1 MMOL/L (ref 22–29)
CREAT SERPL-MCNC: 0.52 MG/DL (ref 0.57–1)
DEPRECATED RDW RBC AUTO: 41.2 FL (ref 37–54)
EGFRCR SERPLBLD CKD-EPI 2021: 100.7 ML/MIN/1.73
EOSINOPHIL # BLD AUTO: 0.12 10*3/MM3 (ref 0–0.4)
EOSINOPHIL NFR BLD AUTO: 1.5 % (ref 0.3–6.2)
ERYTHROCYTE [DISTWIDTH] IN BLOOD BY AUTOMATED COUNT: 13.1 % (ref 12.3–15.4)
GLUCOSE BLDC GLUCOMTR-MCNC: 108 MG/DL (ref 70–130)
GLUCOSE BLDC GLUCOMTR-MCNC: 108 MG/DL (ref 70–130)
GLUCOSE BLDC GLUCOMTR-MCNC: 120 MG/DL (ref 70–130)
GLUCOSE BLDC GLUCOMTR-MCNC: 185 MG/DL (ref 70–130)
GLUCOSE SERPL-MCNC: 118 MG/DL (ref 65–99)
HCT VFR BLD AUTO: 35.5 % (ref 34–46.6)
HGB BLD-MCNC: 10.9 G/DL (ref 12–15.9)
IMM GRANULOCYTES # BLD AUTO: 0.02 10*3/MM3 (ref 0–0.05)
IMM GRANULOCYTES NFR BLD AUTO: 0.2 % (ref 0–0.5)
LYMPHOCYTES # BLD AUTO: 1.71 10*3/MM3 (ref 0.7–3.1)
LYMPHOCYTES NFR BLD AUTO: 21.2 % (ref 19.6–45.3)
MCH RBC QN AUTO: 26.8 PG (ref 26.6–33)
MCHC RBC AUTO-ENTMCNC: 30.7 G/DL (ref 31.5–35.7)
MCV RBC AUTO: 87.2 FL (ref 79–97)
MONOCYTES # BLD AUTO: 0.57 10*3/MM3 (ref 0.1–0.9)
MONOCYTES NFR BLD AUTO: 7.1 % (ref 5–12)
NEUTROPHILS NFR BLD AUTO: 5.6 10*3/MM3 (ref 1.7–7)
NEUTROPHILS NFR BLD AUTO: 69.3 % (ref 42.7–76)
NRBC BLD AUTO-RTO: 0 /100 WBC (ref 0–0.2)
PLATELET # BLD AUTO: 264 10*3/MM3 (ref 140–450)
PMV BLD AUTO: 10.4 FL (ref 6–12)
POTASSIUM SERPL-SCNC: 3.4 MMOL/L (ref 3.5–5.2)
RBC # BLD AUTO: 4.07 10*6/MM3 (ref 3.77–5.28)
SODIUM SERPL-SCNC: 142 MMOL/L (ref 136–145)
WBC NRBC COR # BLD: 8.08 10*3/MM3 (ref 3.4–10.8)

## 2023-03-11 PROCEDURE — 25010000002 CEFTRIAXONE PER 250 MG: Performed by: INTERNAL MEDICINE

## 2023-03-11 PROCEDURE — 97116 GAIT TRAINING THERAPY: CPT

## 2023-03-11 PROCEDURE — 80048 BASIC METABOLIC PNL TOTAL CA: CPT | Performed by: INTERNAL MEDICINE

## 2023-03-11 PROCEDURE — 25010000002 ENOXAPARIN PER 10 MG: Performed by: INTERNAL MEDICINE

## 2023-03-11 PROCEDURE — 85025 COMPLETE CBC W/AUTO DIFF WBC: CPT | Performed by: INTERNAL MEDICINE

## 2023-03-11 PROCEDURE — 82962 GLUCOSE BLOOD TEST: CPT

## 2023-03-11 RX ADMIN — ENOXAPARIN SODIUM 40 MG: 100 INJECTION SUBCUTANEOUS at 08:03

## 2023-03-11 RX ADMIN — Medication 3 MG: at 20:56

## 2023-03-11 RX ADMIN — MIRTAZAPINE 15 MG: 15 TABLET, FILM COATED ORAL at 20:56

## 2023-03-11 RX ADMIN — PANTOPRAZOLE SODIUM 40 MG: 40 TABLET, DELAYED RELEASE ORAL at 20:56

## 2023-03-11 RX ADMIN — CEFTRIAXONE SODIUM 1 G: 1 INJECTION, POWDER, FOR SOLUTION INTRAMUSCULAR; INTRAVENOUS at 15:12

## 2023-03-11 RX ADMIN — Medication 10 ML: at 08:03

## 2023-03-11 RX ADMIN — Medication 10 ML: at 20:56

## 2023-03-11 RX ADMIN — ARIPIPRAZOLE 5 MG: 5 TABLET ORAL at 08:03

## 2023-03-11 RX ADMIN — PANTOPRAZOLE SODIUM 40 MG: 40 TABLET, DELAYED RELEASE ORAL at 08:03

## 2023-03-11 RX ADMIN — DIVALPROEX SODIUM 500 MG: 500 TABLET, FILM COATED, EXTENDED RELEASE ORAL at 20:56

## 2023-03-11 NOTE — PLAN OF CARE
Goal Outcome Evaluation:              Outcome Evaluation: Pt tolerates increased ambulation distance this date compared to previous with a standing rest break after 50 feet. She requires frequent cues for redirection and safety. She denies dizziness thorughout session and overall is progressing well toward functional goals.

## 2023-03-11 NOTE — PLAN OF CARE
Goal Outcome Evaluation:  Plan of Care Reviewed With: patient           Outcome Evaluation: VSS, pt still confused and hollering out after multiple reminders to use the call light, voiding well with purewic, no complaints of pain, poor appetite, IV antibiotics given per order, ambulated with PT today and did well, plan for possible discharge to care facility when safe to.

## 2023-03-11 NOTE — PLAN OF CARE
Goal Outcome Evaluation:  Plan of Care Reviewed With: patient           Outcome Evaluation: VSS, pt very forgetful with AMS, pt walked with PT today and tolerated well, voiding well via purewic, no complaints of pain, continuous reminder to use call light instead of yelling out, IV antibiotics infused per order, plan to discharge to SNF upon discharge.

## 2023-03-11 NOTE — PROGRESS NOTES
Name: Krupa Mcmanus ADMIT: 3/7/2023   : 1955  PCP: Akosua Staley APRN    MRN: [id number] LOS: 4 days   AGE/SEX: 67 y.o. female  ROOM: [facility]     Subjective   Subjective     Patient is lying in the bed and is in no major distress.  She is awake alert and oriented x3 today.  Denies nausea, vomiting abdominal pain, chest pain.         Objective   Objective   Vital Signs  Temp:  [96.9 °F (36.1 °C)-98 °F (36.7 °C)] 96.9 °F (36.1 °C)  Heart Rate:  [] 82  Resp:  [18] 18  BP: (122-165)/(73-85) 132/81  SpO2:  [93 %-97 %] 97 %  on   ;   Device (Oxygen Therapy): room air  Body mass index is 29.18 kg/m².  Physical Exam   HEENT: PERRLA, extraocular is intact, Sclerus no icterus  Neck: Supple, no JVD  Cardiovascular: Regular rate and rhythm with normal S1-S2  Respiratory: Distant breath sounds, no wheezes  GI: Obese, soft, nontender, bowel sounds are present  Extremities: No edema, palpable pedal pulses  Neurologic: Grossly nonfocal, no facial asymmetry  Results Review     I reviewed the patient's new clinical results.  Results from last 7 days   Lab Units 23  0620 03/10/23  0523  03323  1246   WBC 10*3/mm3 8.08 11.18* 10.50 16.48*   HEMOGLOBIN g/dL 10.9* 11.4* 10.9* 12.0   PLATELETS 10*3/mm3 264 239 234 293     Results from last 7 days   Lab Units 23  0620 03/10/23  0512 23  0332 23  1246   SODIUM mmol/L 142 141 142 139   POTASSIUM mmol/L 3.4* 3.4* 3.5 3.9   CHLORIDE mmol/L 106 104 108* 103   CO2 mmol/L 22.1 23.0 24.1 24.3   BUN mg/dL 7* 5* 5* 13   CREATININE mg/dL 0.52* 0.47* 0.50* 0.76   GLUCOSE mg/dL 118* 115* 107* 142*   EGFR mL/min/1.73 100.7 103.2 101.7 84.9     Results from last 7 days   Lab Units 23  1246   ALBUMIN g/dL 4.1   BILIRUBIN mg/dL 0.7   ALK PHOS U/L 131*   AST (SGOT) U/L 30   ALT (SGPT) U/L 25     Results from last 7 days   Lab Units 23  0620 03/10/23  0512 23  0332 23  1246   CALCIUM mg/dL 8.9 8.7 8.5* 9.4   ALBUMIN  g/dL  --   --   --  4.1     Results from last 7 days   Lab Units 03/08/23  1109 03/08/23  0423 03/07/23  2149 03/07/23  1857   LACTATE mmol/L 2.0 2.1* 3.0* 2.5*     Glucose   Date/Time Value Ref Range Status   03/11/2023 1628 108 70 - 130 mg/dL Final     Comment:     Meter: [id number] : [id number] Nora Stern NA   03/11/2023 1137 185 (H) 70 - 130 mg/dL Final     Comment:     Meter: [id number] : [id number] Nora Stern NA   03/11/2023 0549 120 70 - 130 mg/dL Final     Comment:     Meter: [id number] : [id number] Silvio Daxa NA   03/10/2023 2038 144 (H) 70 - 130 mg/dL Final     Comment:     Meter: [id number] : [id number] Silvio Daxa NA   03/10/2023 1600 114 70 - 130 mg/dL Final     Comment:     Meter: [id number] : [id number] Bala  Nisa NA   03/10/2023 1112 136 (H) 70 - 130 mg/dL Final     Comment:     Meter: [id number] : [id number] Bala  Nisa NA   03/10/2023 0547 115 70 - 130 mg/dL Final     Comment:     Meter: [id number] : [id number] Silvio Daxa NA       No radiology results for the last day  I have personally reviewed all medications:  Scheduled Medications  ARIPiprazole, 5 mg, Oral, Daily  cefTRIAXone, 1 g, Intravenous, Q24H  divalproex, 500 mg, Oral, Nightly  enoxaparin, 40 mg, Subcutaneous, Daily  insulin lispro, 0-9 Units, Subcutaneous, TID AC  mirtazapine, 15 mg, Oral, Nightly  pantoprazole, 40 mg, Oral, BID  sodium chloride, 10 mL, Intravenous, Q12H    Infusions   Diet  Diet: Cardiac Diets, Diabetic Diets, Renal Diets; Healthy Heart (2-3 Na+); Consistent Carbohydrate; Low Sodium (2-3g), Low Potassium, Low Phosphorus; Texture: Regular Texture (IDDSI 7); Fluid Consistency: Thin (IDDSI 0)    I have personally reviewed:  [x]  Laboratory   [x]  Microbiology   [x]  Radiology   [x]  EKG/Telemetry  [x]  Cardiology/Vascular   [x]  Pathology    [x]  Records       Assessment/Plan     Active Hospital Problems    Diagnosis  POA   • **Metabolic encephalopathy [G93.41]   Yes   • Pneumonia [J18.9]  Unknown   • Social problem [Z65.9]  Not Applicable   • Dementia with behavioral disturbance [F03.918]  Yes   • Type 2 diabetes mellitus with hyperglycemia (HCC) [E11.65]  Yes   • Essential hypertension [I10]  Yes   • Leukocytosis [D72.829]  Yes      Resolved Hospital Problems   No resolved problems to display.     1. Acute metabolic encephalopathy most likely secondary to underlying pneumonia/UTI.  Mental status is now close to baseline.  Urine cultures grew E. coli and sensitive to IV Rocephin which will be continued.    2.  Pneumonia, lactate improved and WBC is normal and patient is afebrile.  On Rocephin which will be continued and will complete a course respiratory viral panel was negative.    3.  Diabetes mellitus, on corrective dose insulin.    4.  GERD, on acid suppressive therapy.    5.  Generalized weakness, PT/OT did evaluate and recommended rehab placement.  Will discharge once a bed is available.    6.  Agitation with known history of bipolar, patient has been initiated on Depakote and Abilify that she did used to take in the past was reinitiated by psychiatry.    7.  On Lovenox for DVT prophylaxis.    8.  CODE STATUS is full code.    9.  Estimated discharge date, anytime to rehab once a bed is available.    Copied text on this note has been reviewed by me on 3/11/2023      Jerardo Magaña MD  [facility]  03/11/23  17:02 EST

## 2023-03-11 NOTE — PLAN OF CARE
Goal Outcome Evaluation:               Pt admitted on 3/7 with a diagnosis of AMS. Pt has a history of Alzheimers and dementia. Pt answers orientation questions correctly except for time. Pt has moments where she just calls out repeatedly for the same thing. Pt continues with the Pure Wick that functions well, but pt constantly thinks she is having an accident. Pt has no complaints of pain. Pt is resting at this time, will continue to monitor.

## 2023-03-11 NOTE — THERAPY TREATMENT NOTE
Patient Name: Krupa Mcmanus  : 1955    MRN: [id number]                              Today's Date: 3/11/2023       Admit Date: 3/7/2023    Visit Dx:     ICD-10-CM ICD-9-CM   1. Sepsis with encephalopathy without septic shock, due to unspecified organism (HCC)  A41.9 038.9    R65.20 995.91    G93.40 348.30   2. Uncontrolled type 2 diabetes mellitus with hyperglycemia (HCC)  E11.65 250.02   3. Pneumonia of both lower lobes due to infectious organism  J18.9 486   4. Dementia with behavioral disturbance  F03.918 294.21     Patient Active Problem List   Diagnosis   • Affective psychosis, bipolar (HCC)   • Essential hypertension   • Mild intermittent asthma without complication   • Hypokalemia   • Leukocytosis   • Elevated liver enzymes   • Type 2 diabetes mellitus with hyperglycemia (HCC)   • Acute UTI   • Metabolic encephalopathy   • Dementia with behavioral disturbance   • Encephalopathy   • Sepsis with encephalopathy without septic shock, due to unspecified organism (HCC)   • Pneumonia   • Social problem     Past Medical History:   Diagnosis Date   • Alzheimer disease (HCC)    • Anxiety    • Bipolar 1 disorder (HCC)    • Dementia (HCC)    • Depression    • Diabetes mellitus (HCC)    • GERD (gastroesophageal reflux disease)    • OCD (obsessive compulsive disorder)    • Rheumatoid arthritis (HCC)    • UTI (urinary tract infection)      Past Surgical History:   Procedure Laterality Date   • CHOLECYSTECTOMY     • HYSTERECTOMY        General Information     Row Name 23 1338          Physical Therapy Time and Intention    Document Type therapy note (daily note)  -RS     Mode of Treatment individual therapy;physical therapy  -RS     Row Name 23 1338          General Information    Patient Profile Reviewed yes  -RS     Existing Precautions/Restrictions fall  -RS           User Key  (r) = Recorded By, (t) = Taken By, (c) = Cosigned By    Initials Name Provider Type    RS Sara Velazquez PT Physical  Therapist               Mobility     Row Name 03/11/23 1338          Bed Mobility    Bed Mobility supine-sit  -RS     Supine-Sit [place] (Bed Mobility) contact guard  -RS     Assistive Device (Bed Mobility) head of bed elevated;bed rails  -RS     Row Name 03/11/23 0058          Sit-Stand Transfer    Sit-Stand [place] (Transfers) contact guard  -RS     Assistive Device (Sit-Stand Transfers) walker, front-wheeled  -RS     Row Name 03/11/23 1337          Gait/Stairs (Locomotion)    [place] Level (Gait) contact guard  -RS     Assistive Device (Gait) walker, front-wheeled  -RS     Distance in Feet (Gait) 50,80  -RS     Deviations/Abnormal Patterns (Gait) sarah decreased;gait speed decreased  -RS     Comment, (Gait/Stairs) slow sarah, cues for navigation  -RS           User Key  (r) = Recorded By, (t) = Taken By, (c) = Cosigned By    Initials Name Provider Type    RS Sara Velazquez, PT Physical Therapist               Obj/Interventions    No documentation.                Goals/Plan    No documentation.                Clinical Impression     Row Name 03/11/23 1340          Pain    Pretreatment Pain Rating 0/10 - no pain  -RS     Posttreatment Pain Rating 0/10 - no pain  -RS     Row Name 03/11/23 1340          Plan of Care Review    Outcome Evaluation Pt tolerates increased ambulation distance this date compared to previous with a standing rest break after 50 feet. She requires frequent cues for redirection and safety. She denies dizziness thorughout session and overall is progressing well toward functional goals.  -RS     Row Name 03/11/23 1340          Therapy Assessment/Plan (PT)    Rehab Potential (PT) good, to achieve stated therapy goals  -RS     Criteria for Skilled Interventions Met (PT) yes  -RS     Therapy Frequency (PT) 6 times/wk  -RS     Row Name 03/11/23 1340          Positioning and Restraints    Pre-Treatment Position in bed  -RS     Post Treatment Position chair  -RS     In Chair  reclined;call light within reach;encouraged to call for assist;exit alarm on  -RS           User Key  (r) = Recorded By, (t) = Taken By, (c) = Cosigned By    Initials Name Provider Type    RS Sara Velazquez, ANASTACIA Physical Therapist               Outcome Measures     Row Name 03/11/23 1342 03/11/23 0800       How much help from another person do you currently need...    Turning from your back to your side while in flat bed without using bedrails? 3  -RS 3  -CC    Moving from lying on back to sitting on the side of a flat bed without bedrails? 3  -RS 3  -CC    Moving to and from a bed to a chair (including a wheelchair)? 3  -RS 3  -CC    Standing up from a chair using your arms (e.g., wheelchair, bedside chair)? 3  -RS 3  -CC    Climbing 3-5 steps with a railing? 3  -RS 3  -CC    To walk in hospital room? 3  -RS 3  -CC    AM-PAC 6 Clicks Score (PT) 18  -RS 18  -CC    Highest level of mobility 6 --> Walked 10 steps or more  -RS 6 --> Walked 10 steps or more  -CC    Row Name 03/11/23 1342          Functional Assessment    Outcome Measure Options AM-PAC 6 Clicks Basic Mobility (PT)  -RS           User Key  (r) = Recorded By, (t) = Taken By, (c) = Cosigned By    Initials Name Provider Type    Sara Bailey PT Physical Therapist    CC [place] Wise, RN Registered Nurse                             Physical Therapy Education     Title: PT OT SLP Therapies (In Progress)     Topic: Physical Therapy (In Progress)     Point: Mobility training (In Progress)     Learning Progress Summary           Patient Acceptance, E, NR by RS at 3/11/2023 1342    Acceptance, E, NR by  at 3/10/2023 0945                   Point: Home exercise program (In Progress)     Learning Progress Summary           Patient Acceptance, E, NR by RS at 3/11/2023 1342                   Point: Body mechanics (In Progress)     Learning Progress Summary           Patient Acceptance, E, NR by RS at 3/11/2023 1342    Acceptance, E, NR by  at  3/10/2023 0945                   Point: Precautions (In Progress)     Learning Progress Summary           Patient Acceptance, E, NR by RS at 3/11/2023 1342    Acceptance, E, NR by  at 3/10/2023 0945                               User Key     Initials Effective Dates Name Provider Type Discipline    RS 06/16/21 -  Sara Velazquez, PT Physical Therapist PT     05/02/22 -  Josey Turcios PT Physical Therapist PT              PT Recommendation and Plan     Outcome Evaluation: Pt tolerates increased ambulation distance this date compared to previous with a standing rest break after 50 feet. She requires frequent cues for redirection and safety. She denies dizziness thorughout session and overall is progressing well toward functional goals.     Time Calculation:    PT Charges     Row Name 03/11/23 1343 03/11/23 1342          Time Calculation    Start Time -- 1018  -RS     Stop Time -- 1037  -RS     Time Calculation (min) -- 19 min  -RS     PT Received On 03/11/23  -RS 03/11/23  -RS     PT - Next Appointment 03/13/23  -RS --        Timed Charges    03997 - Gait Training Minutes  -- 10  -RS     78705 - PT Therapeutic Activity Minutes -- 8  -RS        Total Minutes    Timed Charges Total Minutes -- 18  -RS      Total Minutes -- 18  -RS           User Key  (r) = Recorded By, (t) = Taken By, (c) = Cosigned By    Initials Name Provider Type    RS Sara Velazquez, ANASTACIA Physical Therapist              Therapy Charges for Today     Code Description Service Date Service Provider Modifiers Qty    11375623700 HC GAIT TRAINING EA 15 MIN 3/11/2023 Sara Velazquez PT GP 1          PT G-Codes  Outcome Measure Options: AM-PAC 6 Clicks Basic Mobility (PT)  AM-PAC 6 Clicks Score (PT): 18  PT Discharge Summary  Anticipated Discharge Disposition (PT): skilled nursing facility    Sara Velazquez PT  3/11/2023

## 2023-03-12 LAB
ANION GAP SERPL CALCULATED.3IONS-SCNC: 13 MMOL/L (ref 5–15)
BACTERIA SPEC AEROBE CULT: NORMAL
BACTERIA SPEC AEROBE CULT: NORMAL
BASOPHILS # BLD AUTO: 0.05 10*3/MM3 (ref 0–0.2)
BASOPHILS NFR BLD AUTO: 0.7 % (ref 0–1.5)
BUN SERPL-MCNC: 13 MG/DL (ref 8–23)
BUN/CREAT SERPL: 25 (ref 7–25)
CALCIUM SPEC-SCNC: 8.9 MG/DL (ref 8.6–10.5)
CHLORIDE SERPL-SCNC: 107 MMOL/L (ref 98–107)
CO2 SERPL-SCNC: 24 MMOL/L (ref 22–29)
CREAT SERPL-MCNC: 0.52 MG/DL (ref 0.57–1)
DEPRECATED RDW RBC AUTO: 43.2 FL (ref 37–54)
EGFRCR SERPLBLD CKD-EPI 2021: 100.7 ML/MIN/1.73
EOSINOPHIL # BLD AUTO: 0.06 10*3/MM3 (ref 0–0.4)
EOSINOPHIL NFR BLD AUTO: 0.9 % (ref 0.3–6.2)
ERYTHROCYTE [DISTWIDTH] IN BLOOD BY AUTOMATED COUNT: 14 % (ref 12.3–15.4)
GLUCOSE BLDC GLUCOMTR-MCNC: 100 MG/DL (ref 70–130)
GLUCOSE BLDC GLUCOMTR-MCNC: 102 MG/DL (ref 70–130)
GLUCOSE BLDC GLUCOMTR-MCNC: 119 MG/DL (ref 70–130)
GLUCOSE BLDC GLUCOMTR-MCNC: 135 MG/DL (ref 70–130)
GLUCOSE SERPL-MCNC: 120 MG/DL (ref 65–99)
HCT VFR BLD AUTO: 35.6 % (ref 34–46.6)
HGB BLD-MCNC: 11.3 G/DL (ref 12–15.9)
IMM GRANULOCYTES # BLD AUTO: 0.01 10*3/MM3 (ref 0–0.05)
IMM GRANULOCYTES NFR BLD AUTO: 0.1 % (ref 0–0.5)
LYMPHOCYTES # BLD AUTO: 1.22 10*3/MM3 (ref 0.7–3.1)
LYMPHOCYTES NFR BLD AUTO: 17.8 % (ref 19.6–45.3)
MCH RBC QN AUTO: 27.5 PG (ref 26.6–33)
MCHC RBC AUTO-ENTMCNC: 31.7 G/DL (ref 31.5–35.7)
MCV RBC AUTO: 86.6 FL (ref 79–97)
MONOCYTES # BLD AUTO: 0.44 10*3/MM3 (ref 0.1–0.9)
MONOCYTES NFR BLD AUTO: 6.4 % (ref 5–12)
NEUTROPHILS NFR BLD AUTO: 5.08 10*3/MM3 (ref 1.7–7)
NEUTROPHILS NFR BLD AUTO: 74.1 % (ref 42.7–76)
NRBC BLD AUTO-RTO: 0 /100 WBC (ref 0–0.2)
PLATELET # BLD AUTO: 273 10*3/MM3 (ref 140–450)
PMV BLD AUTO: 10.2 FL (ref 6–12)
POTASSIUM SERPL-SCNC: 3.5 MMOL/L (ref 3.5–5.2)
RBC # BLD AUTO: 4.11 10*6/MM3 (ref 3.77–5.28)
SODIUM SERPL-SCNC: 144 MMOL/L (ref 136–145)
WBC NRBC COR # BLD: 6.86 10*3/MM3 (ref 3.4–10.8)

## 2023-03-12 PROCEDURE — 25010000002 CEFTRIAXONE PER 250 MG: Performed by: INTERNAL MEDICINE

## 2023-03-12 PROCEDURE — 80048 BASIC METABOLIC PNL TOTAL CA: CPT | Performed by: INTERNAL MEDICINE

## 2023-03-12 PROCEDURE — 85025 COMPLETE CBC W/AUTO DIFF WBC: CPT | Performed by: INTERNAL MEDICINE

## 2023-03-12 PROCEDURE — 82962 GLUCOSE BLOOD TEST: CPT

## 2023-03-12 PROCEDURE — 25010000002 HALOPERIDOL LACTATE PER 5 MG: Performed by: SPECIALIST

## 2023-03-12 PROCEDURE — 25010000002 ENOXAPARIN PER 10 MG: Performed by: INTERNAL MEDICINE

## 2023-03-12 RX ADMIN — Medication 10 ML: at 09:11

## 2023-03-12 RX ADMIN — DIVALPROEX SODIUM 500 MG: 500 TABLET, FILM COATED, EXTENDED RELEASE ORAL at 19:49

## 2023-03-12 RX ADMIN — ARIPIPRAZOLE 5 MG: 5 TABLET ORAL at 09:11

## 2023-03-12 RX ADMIN — HALOPERIDOL LACTATE 2 MG: 5 INJECTION, SOLUTION INTRAMUSCULAR at 00:14

## 2023-03-12 RX ADMIN — MIRTAZAPINE 15 MG: 15 TABLET, FILM COATED ORAL at 19:49

## 2023-03-12 RX ADMIN — ENOXAPARIN SODIUM 40 MG: 100 INJECTION SUBCUTANEOUS at 09:11

## 2023-03-12 RX ADMIN — PANTOPRAZOLE SODIUM 40 MG: 40 TABLET, DELAYED RELEASE ORAL at 09:11

## 2023-03-12 RX ADMIN — CEFTRIAXONE SODIUM 1 G: 1 INJECTION, POWDER, FOR SOLUTION INTRAMUSCULAR; INTRAVENOUS at 15:22

## 2023-03-12 RX ADMIN — PANTOPRAZOLE SODIUM 40 MG: 40 TABLET, DELAYED RELEASE ORAL at 19:49

## 2023-03-12 NOTE — PLAN OF CARE
Goal Outcome Evaluation:  Plan of Care Reviewed With: patient           Outcome Evaluation: VSS, pt constantly calling/screaming out, voiding well via purewic, pt very forgetful, low appetite, no prn pain medications given this shift, family visited today, plan to discharge to a rehab facility when deemed safe to.

## 2023-03-12 NOTE — PROGRESS NOTES
Name: Krupa Mcmanus ADMIT: 3/7/2023   : 1955  PCP: Akosua Staley APRN    MRN: [id number] LOS: 5 days   AGE/SEX: 67 y.o. female  ROOM: [facility]     Subjective   Subjective     Patient is lying in the bed and is in no major distress.  She is awake alert and oriented x3 today.  Denies nausea, vomiting abdominal pain, chest pain.         Objective   Objective   Vital Signs  Temp:  [97.5 °F (36.4 °C)-97.8 °F (36.6 °C)] 97.6 °F (36.4 °C)  Heart Rate:  [57-68] 68  Resp:  [16-18] 16  BP: (117-138)/(69-83) 117/70  SpO2:  [93 %-96 %] 96 %  on   ;   Device (Oxygen Therapy): room air  Body mass index is 29.18 kg/m².  Physical Exam   HEENT: PERRLA, extraocular is intact, Sclerus no icterus  Neck: Supple, no JVD  Cardiovascular: Regular rate and rhythm with normal S1-S2  Respiratory: Distant breath sounds, no wheezes  GI: Obese, soft, nontender, bowel sounds are present  Extremities: No edema, palpable pedal pulses  Neurologic: Grossly nonfocal, no facial asymmetry  Results Review     I reviewed the patient's new clinical results.  Results from last 7 days   Lab Units 23  0948 23  0620 03/10/23  0512 23  0332   WBC 10*3/mm3 6.86 8.08 11.18* 10.50   HEMOGLOBIN g/dL 11.3* 10.9* 11.4* 10.9*   PLATELETS 10*3/mm3 273 264 239 234     Results from last 7 days   Lab Units 23  0747 23  0620 03/10/23  0512 23  0332   SODIUM mmol/L 144 142 141 142   POTASSIUM mmol/L 3.5 3.4* 3.4* 3.5   CHLORIDE mmol/L 107 106 104 108*   CO2 mmol/L 24.0 22.1 23.0 24.1   BUN mg/dL 13 7* 5* 5*   CREATININE mg/dL 0.52* 0.52* 0.47* 0.50*   GLUCOSE mg/dL 120* 118* 115* 107*   EGFR mL/min/1.73 100.7 100.7 103.2 101.7     Results from last 7 days   Lab Units 23  1246   ALBUMIN g/dL 4.1   BILIRUBIN mg/dL 0.7   ALK PHOS U/L 131*   AST (SGOT) U/L 30   ALT (SGPT) U/L 25     Results from last 7 days   Lab Units 23  0747 23  0620 03/10/23  0512 23  0332 23  1246   CALCIUM mg/dL 8.9 8.9  8.7 8.5* 9.4   ALBUMIN g/dL  --   --   --   --  4.1     Results from last 7 days   Lab Units 03/08/23  1109 03/08/23  0423 03/07/23  2149 03/07/23  1857   LACTATE mmol/L 2.0 2.1* 3.0* 2.5*     Glucose   Date/Time Value Ref Range Status   03/12/2023 1037 135 (H) 70 - 130 mg/dL Final     Comment:     Meter: [id number] : [id number] Bill Smiley NA   03/12/2023 0605 119 70 - 130 mg/dL Final     Comment:     Meter: [id number] : [id number] Nacario Nereyda  NA   03/11/2023 2037 108 70 - 130 mg/dL Final     Comment:     Meter: [id number] : [id number] Nacario Nereyda  NA   03/11/2023 1628 108 70 - 130 mg/dL Final     Comment:     Meter: [id number] : [id number] Melvindale Maredie NA   03/11/2023 1137 185 (H) 70 - 130 mg/dL Final     Comment:     Meter: [id number] : [id number] Melvindalerena Khanamarjit NA   03/11/2023 0549 120 70 - 130 mg/dL Final     Comment:     Meter: [id number] : [id number] Silvio LEES   03/10/2023 2038 144 (H) 70 - 130 mg/dL Final     Comment:     Meter: [id number] : [id number] Silvio Mittal NA       No radiology results for the last day  I have personally reviewed all medications:  Scheduled Medications  ARIPiprazole, 5 mg, Oral, Daily  cefTRIAXone, 1 g, Intravenous, Q24H  divalproex, 500 mg, Oral, Nightly  enoxaparin, 40 mg, Subcutaneous, Daily  insulin lispro, 0-9 Units, Subcutaneous, TID AC  mirtazapine, 15 mg, Oral, Nightly  pantoprazole, 40 mg, Oral, BID  sodium chloride, 10 mL, Intravenous, Q12H    Infusions   Diet  Diet: Cardiac Diets, Diabetic Diets, Renal Diets; Healthy Heart (2-3 Na+); Consistent Carbohydrate; Low Sodium (2-3g), Low Potassium, Low Phosphorus; Texture: Regular Texture (IDDSI 7); Fluid Consistency: Thin (IDDSI 0)    I have personally reviewed:  [x]  Laboratory   [x]  Microbiology   [x]  Radiology   [x]  EKG/Telemetry  [x]  Cardiology/Vascular   [x]  Pathology    [x]  Records       Assessment/Plan     Active Hospital Problems    Diagnosis  POA   • **Metabolic  encephalopathy [G93.41]  Yes   • Pneumonia [J18.9]  Unknown   • Social problem [Z65.9]  Not Applicable   • Dementia with behavioral disturbance [F03.918]  Yes   • Type 2 diabetes mellitus with hyperglycemia (HCC) [E11.65]  Yes   • Essential hypertension [I10]  Yes   • Leukocytosis [D72.829]  Yes      Resolved Hospital Problems   No resolved problems to display.     1. Acute metabolic encephalopathy most likely secondary to underlying pneumonia/UTI.  Mental status is now close to baseline.  Urine cultures grew E. coli and sensitive to IV Rocephin which will be continued.    2.  Pneumonia, lactate improved and WBC is normal and patient is afebrile.  On Rocephin which can be stopped after 3/13/2023's dose.  Respiratory viral panel was negative.    3.  Diabetes mellitus, on corrective dose insulin.    4.  GERD, on acid suppressive therapy.    5.  Generalized weakness, PT/OT did evaluate and recommended rehab placement.  Will discharge once a bed is available.    6.  Agitation with known history of bipolar, patient has been initiated on Depakote and Abilify that she did used to take in the past was reinitiated by psychiatry.    7.  On Lovenox for DVT prophylaxis.    8.  CODE STATUS is full code.    9.  Estimated discharge date, anytime to rehab once a bed is available.    Copied text on this note has been reviewed by me on 3/12/2023      Jerardo Magaña MD  [facility]  03/12/23  15:19 EDT

## 2023-03-13 LAB
ANION GAP SERPL CALCULATED.3IONS-SCNC: 10.5 MMOL/L (ref 5–15)
BASOPHILS # BLD AUTO: 0.06 10*3/MM3 (ref 0–0.2)
BASOPHILS NFR BLD AUTO: 0.9 % (ref 0–1.5)
BUN SERPL-MCNC: 14 MG/DL (ref 8–23)
BUN/CREAT SERPL: 23.3 (ref 7–25)
CALCIUM SPEC-SCNC: 8.9 MG/DL (ref 8.6–10.5)
CHLORIDE SERPL-SCNC: 104 MMOL/L (ref 98–107)
CO2 SERPL-SCNC: 23.5 MMOL/L (ref 22–29)
CREAT SERPL-MCNC: 0.6 MG/DL (ref 0.57–1)
DEPRECATED RDW RBC AUTO: 41.8 FL (ref 37–54)
EGFRCR SERPLBLD CKD-EPI 2021: 97.3 ML/MIN/1.73
EOSINOPHIL # BLD AUTO: 0.16 10*3/MM3 (ref 0–0.4)
EOSINOPHIL NFR BLD AUTO: 2.4 % (ref 0.3–6.2)
ERYTHROCYTE [DISTWIDTH] IN BLOOD BY AUTOMATED COUNT: 13.4 % (ref 12.3–15.4)
GLUCOSE BLDC GLUCOMTR-MCNC: 106 MG/DL (ref 70–130)
GLUCOSE BLDC GLUCOMTR-MCNC: 110 MG/DL (ref 70–130)
GLUCOSE BLDC GLUCOMTR-MCNC: 111 MG/DL (ref 70–130)
GLUCOSE BLDC GLUCOMTR-MCNC: 139 MG/DL (ref 70–130)
GLUCOSE SERPL-MCNC: 108 MG/DL (ref 65–99)
HCT VFR BLD AUTO: 34.5 % (ref 34–46.6)
HGB BLD-MCNC: 11.1 G/DL (ref 12–15.9)
IMM GRANULOCYTES # BLD AUTO: 0.02 10*3/MM3 (ref 0–0.05)
IMM GRANULOCYTES NFR BLD AUTO: 0.3 % (ref 0–0.5)
LYMPHOCYTES # BLD AUTO: 2.08 10*3/MM3 (ref 0.7–3.1)
LYMPHOCYTES NFR BLD AUTO: 31.1 % (ref 19.6–45.3)
MAGNESIUM SERPL-MCNC: 2.3 MG/DL (ref 1.6–2.4)
MCH RBC QN AUTO: 27.8 PG (ref 26.6–33)
MCHC RBC AUTO-ENTMCNC: 32.2 G/DL (ref 31.5–35.7)
MCV RBC AUTO: 86.5 FL (ref 79–97)
MONOCYTES # BLD AUTO: 0.56 10*3/MM3 (ref 0.1–0.9)
MONOCYTES NFR BLD AUTO: 8.4 % (ref 5–12)
NEUTROPHILS NFR BLD AUTO: 3.81 10*3/MM3 (ref 1.7–7)
NEUTROPHILS NFR BLD AUTO: 56.9 % (ref 42.7–76)
NRBC BLD AUTO-RTO: 0 /100 WBC (ref 0–0.2)
PLATELET # BLD AUTO: 272 10*3/MM3 (ref 140–450)
PMV BLD AUTO: 10.4 FL (ref 6–12)
POTASSIUM SERPL-SCNC: 3 MMOL/L (ref 3.5–5.2)
RBC # BLD AUTO: 3.99 10*6/MM3 (ref 3.77–5.28)
SODIUM SERPL-SCNC: 138 MMOL/L (ref 136–145)
WBC NRBC COR # BLD: 6.69 10*3/MM3 (ref 3.4–10.8)

## 2023-03-13 PROCEDURE — 25010000002 CEFTRIAXONE PER 250 MG: Performed by: INTERNAL MEDICINE

## 2023-03-13 PROCEDURE — 97116 GAIT TRAINING THERAPY: CPT

## 2023-03-13 PROCEDURE — 85025 COMPLETE CBC W/AUTO DIFF WBC: CPT | Performed by: INTERNAL MEDICINE

## 2023-03-13 PROCEDURE — 25010000002 ENOXAPARIN PER 10 MG: Performed by: INTERNAL MEDICINE

## 2023-03-13 PROCEDURE — 82962 GLUCOSE BLOOD TEST: CPT

## 2023-03-13 PROCEDURE — 97530 THERAPEUTIC ACTIVITIES: CPT

## 2023-03-13 PROCEDURE — 83735 ASSAY OF MAGNESIUM: CPT | Performed by: NURSE PRACTITIONER

## 2023-03-13 PROCEDURE — 80048 BASIC METABOLIC PNL TOTAL CA: CPT | Performed by: INTERNAL MEDICINE

## 2023-03-13 RX ORDER — POTASSIUM CHLORIDE 1.5 G/1.77G
40 POWDER, FOR SOLUTION ORAL AS NEEDED
Status: DISCONTINUED | OUTPATIENT
Start: 2023-03-13 | End: 2023-03-14 | Stop reason: HOSPADM

## 2023-03-13 RX ORDER — POTASSIUM CHLORIDE 750 MG/1
40 TABLET, FILM COATED, EXTENDED RELEASE ORAL AS NEEDED
Status: DISCONTINUED | OUTPATIENT
Start: 2023-03-13 | End: 2023-03-14 | Stop reason: HOSPADM

## 2023-03-13 RX ADMIN — Medication 10 ML: at 21:11

## 2023-03-13 RX ADMIN — PANTOPRAZOLE SODIUM 40 MG: 40 TABLET, DELAYED RELEASE ORAL at 09:48

## 2023-03-13 RX ADMIN — MIRTAZAPINE 15 MG: 15 TABLET, FILM COATED ORAL at 21:06

## 2023-03-13 RX ADMIN — Medication 3 MG: at 21:05

## 2023-03-13 RX ADMIN — Medication 10 ML: at 09:50

## 2023-03-13 RX ADMIN — DIVALPROEX SODIUM 500 MG: 500 TABLET, FILM COATED, EXTENDED RELEASE ORAL at 21:08

## 2023-03-13 RX ADMIN — Medication 3 MG: at 00:30

## 2023-03-13 RX ADMIN — ARIPIPRAZOLE 5 MG: 5 TABLET ORAL at 09:48

## 2023-03-13 RX ADMIN — Medication 10 ML: at 00:32

## 2023-03-13 RX ADMIN — CEFTRIAXONE SODIUM 1 G: 1 INJECTION, POWDER, FOR SOLUTION INTRAMUSCULAR; INTRAVENOUS at 16:21

## 2023-03-13 RX ADMIN — PANTOPRAZOLE SODIUM 40 MG: 40 TABLET, DELAYED RELEASE ORAL at 21:04

## 2023-03-13 RX ADMIN — ENOXAPARIN SODIUM 40 MG: 100 INJECTION SUBCUTANEOUS at 09:48

## 2023-03-13 NOTE — PROGRESS NOTES
The patient remains pleasant cooperative and calm when seen by this physician, but charting does indicate some ongoing problematic behaviors including yelling out, occasionally refusing meds etc.  Charting indicates possible discharge to rehab later today.  I suspect that she is probably at or near her psychiatric baseline.

## 2023-03-13 NOTE — THERAPY TREATMENT NOTE
Patient Name: Krupa Mcmanus  : 1955    MRN: [id number]                              Today's Date: 3/13/2023       Admit Date: 3/7/2023    Visit Dx:     ICD-10-CM ICD-9-CM   1. Sepsis with encephalopathy without septic shock, due to unspecified organism (HCC)  A41.9 038.9    R65.20 995.91    G93.40 348.30   2. Uncontrolled type 2 diabetes mellitus with hyperglycemia (HCC)  E11.65 250.02   3. Pneumonia of both lower lobes due to infectious organism  J18.9 486   4. Dementia with behavioral disturbance  F03.918 294.21     Patient Active Problem List   Diagnosis   • Affective psychosis, bipolar (HCC)   • Essential hypertension   • Mild intermittent asthma without complication   • Hypokalemia   • Leukocytosis   • Elevated liver enzymes   • Type 2 diabetes mellitus with hyperglycemia (HCC)   • Acute UTI   • Metabolic encephalopathy   • Dementia with behavioral disturbance   • Encephalopathy   • Sepsis with encephalopathy without septic shock, due to unspecified organism (HCC)   • Pneumonia   • Social problem     Past Medical History:   Diagnosis Date   • Alzheimer disease (HCC)    • Anxiety    • Bipolar 1 disorder (HCC)    • Dementia (HCC)    • Depression    • Diabetes mellitus (HCC)    • GERD (gastroesophageal reflux disease)    • OCD (obsessive compulsive disorder)    • Rheumatoid arthritis (HCC)    • UTI (urinary tract infection)      Past Surgical History:   Procedure Laterality Date   • CHOLECYSTECTOMY     • HYSTERECTOMY        General Information     Row Name 23 1043          Physical Therapy Time and Intention    Document Type therapy note (daily note)  -EJ     Mode of Treatment physical therapy  -     Row Name 23 1043          General Information    Existing Precautions/Restrictions fall  -EJ     Barriers to Rehab cognitive status  -EJ           User Key  (r) = Recorded By, (t) = Taken By, (c) = Cosigned By    Initials Name Provider Type    EJ Kayla Golden, PT Physical Therapist                Mobility     Row Name 03/13/23 1043          Bed Mobility    Supine-Sit [place] (Bed Mobility) contact guard  -EJ     Sit-Supine [place] (Bed Mobility) contact guard  -EJ     Assistive Device (Bed Mobility) head of bed elevated;bed rails  -EJ     Row Name 03/13/23 1043          Sit-Stand Transfer    Sit-Stand [place] (Transfers) contact guard  -EJ     Assistive Device (Sit-Stand Transfers) walker, front-wheeled  -EJ     Row Name 03/13/23 1043          Gait/Stairs (Locomotion)    [place] Level (Gait) contact guard  -EJ     Assistive Device (Gait) walker, front-wheeled  -EJ     Distance in Feet (Gait) 45' x 2  -EJ     Deviations/Abnormal Patterns (Gait) sarah decreased;stride length decreased  -EJ     Comment, (Gait/Stairs) slow pace, but no overt unsteadiness noted  -EJ           User Key  (r) = Recorded By, (t) = Taken By, (c) = Cosigned By    Initials Name Provider Type    EJ Kayla Golden, PT Physical Therapist               Obj/Interventions    No documentation.                Goals/Plan    No documentation.                Clinical Impression     Row Name 03/13/23 1046          Pain    Pretreatment Pain Rating 0/10 - no pain  -EJ     Row Name 03/13/23 1046          Plan of Care Review    Plan of Care Reviewed With patient  -EJ     Outcome Evaluation Pt agreeable to PT this am. She is able to perform all mobility w SBA/CGA. She ambulated approx 45' x 2 using Rwx. No overt unsteadiness noted. Pt initially up in chair after ambulating, but began to complain and becoming more agitated stating she wanted to get back in bed. Pt then assisted to bed w all needs in reach. Pt continues to yell out once situated in bed, but reassured that she had everything she needed. Plans for SNU at [place]. Will continue to progress as able.  -EJ     Row Name 03/13/23 1046          Positioning and Restraints    Pre-Treatment Position in bed  -EJ     Post Treatment Position bed  -EJ     In Bed notified  nsg;supine;call light within reach;encouraged to call for assist;exit alarm on  -EJ           User Key  (r) = Recorded By, (t) = Taken By, (c) = Cosigned By    Initials Name Provider Type    Kayla Rudd PT Physical Therapist               Outcome Measures     Row Name 03/13/23 1050          How much help from another person do you currently need...    Turning from your back to your side while in flat bed without using bedrails? 4  -EJ     Moving from lying on back to sitting on the side of a flat bed without bedrails? 3  -EJ     Moving to and from a bed to a chair (including a wheelchair)? 3  -EJ     Standing up from a chair using your arms (e.g., wheelchair, bedside chair)? 3  -EJ     Climbing 3-5 steps with a railing? 3  -EJ     To walk in hospital room? 3  -EJ     AM-PAC 6 Clicks Score (PT) 19  -EJ     Highest level of mobility 6 --> Walked 10 steps or more  -EJ           User Key  (r) = Recorded By, (t) = Taken By, (c) = Cosigned By    Initials Name Provider Type    Kayla Rudd, PT Physical Therapist                             Physical Therapy Education     Title: PT OT SLP Therapies (In Progress)     Topic: Physical Therapy (In Progress)     Point: Mobility training (In Progress)     Learning Progress Summary           Patient Acceptance, E, NR by RS at 3/11/2023 1342    Acceptance, E, NR by SM at 3/10/2023 0945                   Point: Home exercise program (In Progress)     Learning Progress Summary           Patient Acceptance, E, NR by RS at 3/11/2023 1342                   Point: Body mechanics (In Progress)     Learning Progress Summary           Patient Acceptance, E, NR by RS at 3/11/2023 1342    Acceptance, E, NR by SM at 3/10/2023 0945                   Point: Precautions (In Progress)     Learning Progress Summary           Patient Acceptance, E, NR by RS at 3/11/2023 1342    Acceptance, E, NR by SM at 3/10/2023 0945                               User Key     Initials Effective  Dates Name Provider Type Discipline    RS 06/16/21 -  Sara Velazquez, PT Physical Therapist PT     05/02/22 -  Josey Turcios, ANASTACIA Physical Therapist PT              PT Recommendation and Plan     Plan of Care Reviewed With: patient  Outcome Evaluation: Pt agreeable to PT this am. She is able to perform all mobility w SBA/CGA. She ambulated approx 45' x 2 using Rwx. No overt unsteadiness noted. Pt initially up in chair after ambulating, but began to complain and becoming more agitated stating she wanted to get back in bed. Pt then assisted to bed w all needs in reach. Pt continues to yell out once situated in bed, but reassured that she had everything she needed. Plans for SNU at [place]. Will continue to progress as able.     Time Calculation:    PT Charges     Row Name 03/13/23 1052             Time Calculation    Start Time 1021  -EJ      Stop Time 1045  -EJ      Time Calculation (min) 24 min  -EJ      PT Received On 03/13/23  -EJ      PT - Next Appointment 03/14/23  -EJ         Timed Charges    92629 - Gait Training Minutes  10  -EJ      92877 - PT Therapeutic Activity Minutes 14  -EJ         Total Minutes    Timed Charges Total Minutes 24  -EJ       Total Minutes 24  -EJ            User Key  (r) = Recorded By, (t) = Taken By, (c) = Cosigned By    Initials Name Provider Type    EJ Kayla Golden, PT Physical Therapist              Therapy Charges for Today     Code Description Service Date Service Provider Modifiers Qty    34683248878 HC PT THERAPEUTIC ACT EA 15 MIN 3/13/2023 Kayla Golden, PT GP 1    96491547060 HC GAIT TRAINING EA 15 MIN 3/13/2023 Kayla Golden, PT GP 1          PT G-Codes  Outcome Measure Options: AM-PAC 6 Clicks Basic Mobility (PT)  AM-PAC 6 Clicks Score (PT): 19       Kayla Golden PT  3/13/2023

## 2023-03-13 NOTE — PLAN OF CARE
Goal Outcome Evaluation:         Patient calls out frequently seeking out staff and despite redirection refuses to use call light despite showing the ability to use it. Patient has been educated several times regarding use of call light and showed ability to use it without difficulty. Patient threw sausage off of breakfast tray this morning. Was reminded to not throw food.

## 2023-03-13 NOTE — PLAN OF CARE
Problem: Fall Injury Risk  Goal: Absence of Fall and Fall-Related Injury  Outcome: Ongoing, Progressing     Problem: Skin Injury Risk Increased  Goal: Skin Health and Integrity  Outcome: Ongoing, Progressing   Goal Outcome Evaluation:           Progress: no change  Outcome Evaluation: Pt slept poorly, refused to answer orientation questions, vss, voiding via purewick,  repositions self, pt frequently yells out for staff or uses call light, needs constant reassurance that she has not wet the bed, verbally abusive at times.

## 2023-03-13 NOTE — PLAN OF CARE
Goal Outcome Evaluation:  Plan of Care Reviewed With: patient           Outcome Evaluation: Pt agreeable to PT this am. She is able to perform all mobility w SBA/CGA. She ambulated approx 45' x 2 using Rwx. No overt unsteadiness noted. Pt initially up in chair after ambulating, but began to complain and becoming more agitated stating she wanted to get back in bed. Pt then assisted to bed w all needs in reach. Pt continues to yell out once situated in bed, but reassured that she had everything she needed. Plans for SNU at [place]. Will continue to progress as able.

## 2023-03-13 NOTE — PROGRESS NOTES
Name: Krupa Mcmanus ADMIT: 3/7/2023   : 1955  PCP: Akosua Staley APRN    MRN: [id number] LOS: 6 days   AGE/SEX: 67 y.o. female  ROOM: [facility]     Subjective   Subjective   Resting in bed. No family present. She states she doesn't feel well as she didn't sleep good last night. Otherwise, denies any chest pain, dyspnea, cough, fever, chills. No dysuria. She doesn't like the food here.     Objective   Objective   Vital Signs  Temp:  [97.5 °F (36.4 °C)-98 °F (36.7 °C)] 97.9 °F (36.6 °C)  Heart Rate:  [54-96] 67  Resp:  [16] 16  BP: (109-160)/(63-86) 160/86  SpO2:  [92 %-97 %] 92 %  on   ;   Device (Oxygen Therapy): room air  Body mass index is 29.18 kg/m².     Physical Exam  Vitals and nursing note reviewed.   Constitutional:       Appearance: She is obese. She is ill-appearing.   Cardiovascular:      Rate and Rhythm: Normal rate and regular rhythm.      Pulses: Normal pulses.   Pulmonary:      Effort: Pulmonary effort is normal. No respiratory distress.      Comments: Diminished, fairly clear, but poor inspiratory effort. On RA  Abdominal:      General: Bowel sounds are normal. There is no distension.      Palpations: Abdomen is soft.      Tenderness: There is no abdominal tenderness.   Musculoskeletal:         General: No swelling. Normal range of motion.      Cervical back: Normal range of motion and neck supple.   Skin:     General: Skin is warm and dry.      Findings: No bruising.   Neurological:      General: No focal deficit present.      Mental Status: She is alert and oriented to person, place, and time.      Sensory: No sensory deficit.      Motor: Weakness present.      Coordination: Coordination normal.   Psychiatric:         Mood and Affect: Mood normal.         Behavior: Behavior normal.      Comments: A little flat.       Results Review:       I reviewed the patient's new clinical results.  Results from last 7 days   Lab Units 23  0441 23  0948 23  0620 03/10/23  0512    WBC 10*3/mm3 6.69 6.86 8.08 11.18*   HEMOGLOBIN g/dL 11.1* 11.3* 10.9* 11.4*   PLATELETS 10*3/mm3 272 273 264 239     Results from last 7 days   Lab Units 03/13/23  0441 03/12/23  0747 03/11/23  0620 03/10/23  0512   SODIUM mmol/L 138 144 142 141   POTASSIUM mmol/L 3.0* 3.5 3.4* 3.4*   CHLORIDE mmol/L 104 107 106 104   CO2 mmol/L 23.5 24.0 22.1 23.0   BUN mg/dL 14 13 7* 5*   CREATININE mg/dL 0.60 0.52* 0.52* 0.47*   GLUCOSE mg/dL 108* 120* 118* 115*   Estimated Creatinine Clearance: 89 mL/min (by C-G formula based on SCr of 0.6 mg/dL).  Results from last 7 days   Lab Units 03/07/23  1246   ALBUMIN g/dL 4.1   BILIRUBIN mg/dL 0.7   ALK PHOS U/L 131*   AST (SGOT) U/L 30   ALT (SGPT) U/L 25     Results from last 7 days   Lab Units 03/13/23  0441 03/12/23  0747 03/11/23  0620 03/10/23  0512 03/09/23  0332 03/07/23  1246   CALCIUM mg/dL 8.9 8.9 8.9 8.7   < > 9.4   ALBUMIN g/dL  --   --   --   --   --  4.1    < > = values in this interval not displayed.     Results from last 7 days   Lab Units 03/08/23  1109 03/08/23  0423 03/07/23  2149 03/07/23  1857   LACTATE mmol/L 2.0 2.1* 3.0* 2.5*     Glucose   Date/Time Value Ref Range Status   03/13/2023 1106 139 (H) 70 - 130 mg/dL Final     Comment:     Meter: [id number] : [id number] Niki Moser NA   03/13/2023 0607 110 70 - 130 mg/dL Final     Comment:     Meter: [id number] : [id number] Neil Swift NA   03/12/2023 2053 100 70 - 130 mg/dL Final     Comment:     Meter: [id number] : [id number] Arti Mariscal NA   03/12/2023 1622 102 70 - 130 mg/dL Final     Comment:     Meter: [id number] : [id number] Bill Smiley NA   03/12/2023 1037 135 (H) 70 - 130 mg/dL Final     Comment:     Meter: [id number] : [id number] Khan Sherice NABEEL   03/12/2023 0605 119 70 - 130 mg/dL Final     Comment:     Meter: [id number] : [id number] Nacario Nereyda  NABEEL   03/11/2023 2037 108 70 - 130 mg/dL Final     Comment:     Meter: [id number] : [id number] Dunia Nereyda  NA  "      ARIPiprazole, 5 mg, Oral, Daily  cefTRIAXone, 1 g, Intravenous, Q24H  divalproex, 500 mg, Oral, Nightly  enoxaparin, 40 mg, Subcutaneous, Daily  insulin lispro, 0-9 Units, Subcutaneous, TID AC  mirtazapine, 15 mg, Oral, Nightly  pantoprazole, 40 mg, Oral, BID  sodium chloride, 10 mL, Intravenous, Q12H       Diet: Cardiac Diets, Diabetic Diets, Renal Diets; Healthy Heart (2-3 Na+); Consistent Carbohydrate; Low Sodium (2-3g), Low Potassium, Low Phosphorus; Texture: Regular Texture (IDDSI 7); Fluid Consistency: Thin (IDDSI 0)       Assessment/Plan     Active Hospital Problems    Diagnosis  POA   • **Metabolic encephalopathy [G93.41]  Yes   • Pneumonia [J18.9]  Yes   • Social problem [Z65.9]  Not Applicable   • Dementia with behavioral disturbance [F03.918]  Yes   • Type 2 diabetes mellitus with hyperglycemia (HCC) [E11.65]  Yes   • Essential hypertension [I10]  Yes   • Leukocytosis [D72.829]  Yes   • Hypokalemia [E87.6]  Yes      Resolved Hospital Problems   No resolved problems to display.     Ms. Mcmanus is a 67 year old female who presented to the hospital with increased confusion and poor intake at home. She has a known history of early onset dementia as well as OCD/bipolar. She was found to have lab evidence of infection with elevated WBC and lactic acidosis. CXR was concerning for pneumonia.    · Metabolic encephalopathy: CT head negative for acute findings on admission. Likely secondary to suspected pneumonia on admission in the setting of other issues above. Seems improved for the most part. Psychiatry consulted and added further medications. Given nodular area on admission CXR and \"no show\" appointment with Thoracic surgery from August, I will go ahead and order a CT chest to further evaluation.    · Pneumonia: As above. On Rocephin with improvement in infectious labs. Will continue and await CT chest.     · Dementia: Medications changes per psych. Mentation is stable.     · DM2: Sugars stable. " Monitor trends on SSI.     · HTN: BP stable. Monitor trends.     · Hypokalemia: Add protocol. Check mag.    Discussed with patient and nursing staff.    VTE Prophylaxis - Lovenox 40 mg SC daily  Code Status - Full code  Disposition - Anticipate discharge next 1-2 days to SNF when precert obtained.      DERRICK Sorenson  [facility]  03/13/23  13:41 EDT

## 2023-03-13 NOTE — CASE MANAGEMENT/SOCIAL WORK
Continued Stay Note  [facility]     Patient Name: Krupa Mcmanus  MRN: [id number]  Today's Date: 3/13/2023    Admit Date: 3/7/2023    Plan: [facility]-pending [employer] [facility] pre-cert   Discharge Plan     Row Name 03/13/23 1356       Plan    Plan [facility]-pending [employer] [facility] pre-cert    Patient/Family in Agreement with Plan yes    Plan Comments Patient appears to be nearing dc.  Spoke with [facility] and they will initiate pre-cert.  If pre-cert is denied, can admit under Medicaid.  Message left for spouse, Stone, to update.  Awaiting call back.  Transfer packet started and in [facility] office.  Indra natalya booked for 3/14 @ 1700.  Jyothi HARTMAN RN               Discharge Codes    No documentation.               Expected Discharge Date and Time     Expected Discharge Date Expected Discharge Time    Mar 13, 2023             Jyothi Lopez RN

## 2023-03-14 ENCOUNTER — APPOINTMENT (OUTPATIENT)
Dept: CT IMAGING | Facility: HOSPITAL | Age: 68
DRG: 871 | End: 2023-03-14
Payer: MEDICARE

## 2023-03-14 VITALS
WEIGHT: 170 LBS | RESPIRATION RATE: 16 BRPM | TEMPERATURE: 98.6 F | BODY MASS INDEX: 29.02 KG/M2 | SYSTOLIC BLOOD PRESSURE: 123 MMHG | OXYGEN SATURATION: 94 % | HEIGHT: 64 IN | DIASTOLIC BLOOD PRESSURE: 73 MMHG | HEART RATE: 82 BPM

## 2023-03-14 PROBLEM — D72.829 LEUKOCYTOSIS: Status: RESOLVED | Noted: 2018-07-12 | Resolved: 2023-03-14

## 2023-03-14 LAB
ANION GAP SERPL CALCULATED.3IONS-SCNC: 14 MMOL/L (ref 5–15)
BASOPHILS # BLD AUTO: 0.05 10*3/MM3 (ref 0–0.2)
BASOPHILS NFR BLD AUTO: 0.8 % (ref 0–1.5)
BUN SERPL-MCNC: 15 MG/DL (ref 8–23)
BUN/CREAT SERPL: 28.3 (ref 7–25)
CALCIUM SPEC-SCNC: 8.5 MG/DL (ref 8.6–10.5)
CHLORIDE SERPL-SCNC: 107 MMOL/L (ref 98–107)
CO2 SERPL-SCNC: 23 MMOL/L (ref 22–29)
CREAT SERPL-MCNC: 0.53 MG/DL (ref 0.57–1)
DEPRECATED RDW RBC AUTO: 41.9 FL (ref 37–54)
EGFRCR SERPLBLD CKD-EPI 2021: 100.3 ML/MIN/1.73
EOSINOPHIL # BLD AUTO: 0.1 10*3/MM3 (ref 0–0.4)
EOSINOPHIL NFR BLD AUTO: 1.6 % (ref 0.3–6.2)
ERYTHROCYTE [DISTWIDTH] IN BLOOD BY AUTOMATED COUNT: 13.8 % (ref 12.3–15.4)
GLUCOSE BLDC GLUCOMTR-MCNC: 102 MG/DL (ref 70–130)
GLUCOSE BLDC GLUCOMTR-MCNC: 107 MG/DL (ref 70–130)
GLUCOSE BLDC GLUCOMTR-MCNC: 119 MG/DL (ref 70–130)
GLUCOSE SERPL-MCNC: 113 MG/DL (ref 65–99)
HCT VFR BLD AUTO: 35.2 % (ref 34–46.6)
HGB BLD-MCNC: 11.1 G/DL (ref 12–15.9)
IMM GRANULOCYTES # BLD AUTO: 0.02 10*3/MM3 (ref 0–0.05)
IMM GRANULOCYTES NFR BLD AUTO: 0.3 % (ref 0–0.5)
LYMPHOCYTES # BLD AUTO: 2 10*3/MM3 (ref 0.7–3.1)
LYMPHOCYTES NFR BLD AUTO: 31.2 % (ref 19.6–45.3)
MCH RBC QN AUTO: 27 PG (ref 26.6–33)
MCHC RBC AUTO-ENTMCNC: 31.5 G/DL (ref 31.5–35.7)
MCV RBC AUTO: 85.6 FL (ref 79–97)
MONOCYTES # BLD AUTO: 0.56 10*3/MM3 (ref 0.1–0.9)
MONOCYTES NFR BLD AUTO: 8.7 % (ref 5–12)
NEUTROPHILS NFR BLD AUTO: 3.69 10*3/MM3 (ref 1.7–7)
NEUTROPHILS NFR BLD AUTO: 57.4 % (ref 42.7–76)
NRBC BLD AUTO-RTO: 0 /100 WBC (ref 0–0.2)
PLATELET # BLD AUTO: 286 10*3/MM3 (ref 140–450)
PMV BLD AUTO: 9.9 FL (ref 6–12)
POTASSIUM SERPL-SCNC: 3.2 MMOL/L (ref 3.5–5.2)
RBC # BLD AUTO: 4.11 10*6/MM3 (ref 3.77–5.28)
SODIUM SERPL-SCNC: 144 MMOL/L (ref 136–145)
WBC NRBC COR # BLD: 6.42 10*3/MM3 (ref 3.4–10.8)

## 2023-03-14 PROCEDURE — 25010000002 ENOXAPARIN PER 10 MG: Performed by: INTERNAL MEDICINE

## 2023-03-14 PROCEDURE — 80048 BASIC METABOLIC PNL TOTAL CA: CPT | Performed by: INTERNAL MEDICINE

## 2023-03-14 PROCEDURE — 71250 CT THORAX DX C-: CPT

## 2023-03-14 PROCEDURE — 85025 COMPLETE CBC W/AUTO DIFF WBC: CPT | Performed by: INTERNAL MEDICINE

## 2023-03-14 PROCEDURE — 82962 GLUCOSE BLOOD TEST: CPT

## 2023-03-14 RX ORDER — ACETAMINOPHEN 325 MG/1
650 TABLET ORAL EVERY 4 HOURS PRN
Start: 2023-03-14

## 2023-03-14 RX ORDER — DIVALPROEX SODIUM 250 MG/1
500 TABLET, EXTENDED RELEASE ORAL NIGHTLY
Start: 2023-03-14

## 2023-03-14 RX ORDER — PANTOPRAZOLE SODIUM 40 MG/1
40 TABLET, DELAYED RELEASE ORAL 2 TIMES DAILY
Start: 2023-03-14

## 2023-03-14 RX ORDER — POTASSIUM CHLORIDE 750 MG/1
40 TABLET, FILM COATED, EXTENDED RELEASE ORAL ONCE
Status: COMPLETED | OUTPATIENT
Start: 2023-03-14 | End: 2023-03-14

## 2023-03-14 RX ADMIN — PANTOPRAZOLE SODIUM 40 MG: 40 TABLET, DELAYED RELEASE ORAL at 09:40

## 2023-03-14 RX ADMIN — Medication 10 ML: at 09:40

## 2023-03-14 RX ADMIN — POTASSIUM CHLORIDE 40 MEQ: 750 TABLET, EXTENDED RELEASE ORAL at 16:24

## 2023-03-14 RX ADMIN — ARIPIPRAZOLE 5 MG: 5 TABLET ORAL at 09:40

## 2023-03-14 RX ADMIN — ENOXAPARIN SODIUM 40 MG: 100 INJECTION SUBCUTANEOUS at 09:40

## 2023-03-14 NOTE — PROGRESS NOTES
The patient continues to exhibit fluctuating mentation.  When seen by this physician she is fully oriented and is not noted to be yelling out though charting indicates that this morning she was oriented to person only and continues to yell out much of the time.  Charting indicates discharge today to [facility]..

## 2023-03-14 NOTE — PROGRESS NOTES
Continued Stay Note  [facility]     Patient Name: Krupa Mcmanus  MRN: [id number]  Today's Date: 3/14/2023    Admit Date: 3/7/2023    Plan: [facility] pre-cert   Discharge Plan     Row Name 03/14/23 1555       Plan    Plan Comments [facility] has offered a P2P, but per Christian, they will accept under medicaid.  Transfer packet updated and dc summary faxed.  30 day in packet.  Spouse notified by telephone.  Indra hoang booked for 1700.  Patient will dc to IC level bed at [facility]. Jyothi HARTMAN RN               Discharge Codes    No documentation.               Expected Discharge Date and Time     Expected Discharge Date Expected Discharge Time    Mar 14, 2023             Jyothi Lopez RN

## 2023-03-14 NOTE — PLAN OF CARE
Goal Outcome Evaluation:  Plan of Care Reviewed With: patient        Progress: improving  Patient is alert and oriented to self only. She seeks out for RN all the time by yelling. She has been encouraged to use the call light instead without much success.

## 2023-03-14 NOTE — DISCHARGE SUMMARY
"[facility]               ASSOCIATES    Date of Admission: 3/7/2023  Date of Discharge:  3/14/2023    PCP: Akosua Staley APRN    Discharge Diagnosis:   Active Hospital Problems    Diagnosis  POA   • **Metabolic encephalopathy [G93.41]  Yes   • Pneumonia [J18.9]  Yes   • Social problem [Z65.9]  Not Applicable   • Dementia with behavioral disturbance [F03.918]  Yes   • Type 2 diabetes mellitus with hyperglycemia (HCC) [E11.65]  Yes   • Essential hypertension [I10]  Yes   • Hypokalemia [E87.6]  Yes      Resolved Hospital Problems    Diagnosis Date Resolved POA   • Leukocytosis [D72.829] 03/14/2023 Yes     Procedures Performed  none     Consults     Date and Time Order Name Status Description    3/9/2023  1:56 PM Inpatient Psychiatrist Consult Completed     3/7/2023  2:22 PM [facility] (on-call MD unless specified) Details          Hospital Course  Please see history and physical for details. Patient is a 67 y.o. female who presented to the hospital with increased confusion and poor intake at home. She has a known history of early onset dementia as well as OCD/bipolar. She was found to have lab evidence of infection with elevated WBC and lactic acidosis. CXR was concerning for pneumonia.   The patient was admitted and CT head was negative for acute findings. She was given IV Rocephin and clinically improved. She will receive her 7th dose today and otherwise appears improved from any infectious standpoint. She did have a nodular area on the admission CXR and a \"no show\" appointment in her chart to Thoracic surgery. The patient has no recollection of this. I have ordered a CT chest for further evaluation but otherwise, she will need to continue outpatient surveillance and monitoring as advised.    The patient was seen by psychiatry given her mental health history and dementia. Her Depakote was increased and she has not been a behavioral issue since admission. She was living with her  prior " to admission and there was some concern that he was having trouble caring for her needs at home. Nonetheless, he was agreeable to SNF for rehab prior to returning home.    On the day of discharge, her potassium is a little low and will be replaced. Otherwise, she is hemodynamically stable and afebrile. She denies any chest pain, dyspnea, cough, fever, chills. She is oriented, but a poor historian. She should see an outpatient psychiatrist for ongoing care and see her PCP in 1-2 weeks thereafter.    I discussed the patient's findings and my recommendations with patient, nursing staff and Dr. Nascimento.    Condition on Discharge: Improved.     Temp:  [97.4 °F (36.3 °C)-98.4 °F (36.9 °C)] 97.9 °F (36.6 °C)  Heart Rate:  [] 102  Resp:  [16] 16  BP: (116-165)/(75-91) 149/91  Body mass index is 29.18 kg/m².    Physical Exam  Vitals and nursing note reviewed.   Constitutional:       Appearance: She is obese. She is ill-appearing.   Cardiovascular:      Rate and Rhythm: Normal rate and regular rhythm.      Pulses: Normal pulses.   Pulmonary:      Effort: Pulmonary effort is normal. No respiratory distress.      Comments: Diminished, fairly clear, but poor inspiratory effort. On RA  Abdominal:      General: Bowel sounds are normal. There is no distension.      Palpations: Abdomen is soft.      Tenderness: There is no abdominal tenderness.   Musculoskeletal:         General: No swelling. Normal range of motion.      Cervical back: Normal range of motion and neck supple.   Skin:     General: Skin is warm and dry.      Findings: No bruising.   Neurological:      General: No focal deficit present.      Mental Status: She is alert and oriented to person, place, and time.      Sensory: No sensory deficit.      Motor: Weakness present.      Coordination: Coordination normal.   Psychiatric:         Mood and Affect: Mood normal.         Behavior: Behavior normal.      Comments: A little flat.        Discharge Medications       Changes to Medications      Instructions Start Date   divalproex 250 MG 24 hr tablet  Commonly known as: DEPAKOTE ER  What changed: how much to take   500 mg, Oral, Nightly         Continue These Medications      Instructions Start Date   acetaminophen 325 MG tablet  Commonly known as: TYLENOL   650 mg, Oral, Every 4 Hours PRN      ARIPiprazole 5 MG tablet  Commonly known as: ABILIFY   5 mg, Oral, Daily      mirtazapine 15 MG tablet  Commonly known as: REMERON   15 mg, Oral, Nightly      pantoprazole 40 MG EC tablet  Commonly known as: PROTONIX   40 mg, Oral, 2 Times Daily            Diet Instructions     Diet: Regular/House Diet, Cardiac Diets, Diabetic Diets; Healthy Heart (2-3 Na+); Consistent Carbohydrate; Texture: Regular Texture (IDDSI 7); Fluid Consistency: Thin (IDDSI 0)      Discharge Diet:  Regular/House Diet  Cardiac Diets  Diabetic Diets       Cardiac Diet: Healthy Heart (2-3 Na+)    Diabetic Diet: Consistent Carbohydrate    Texture: Regular Texture (IDDSI 7)    Fluid Consistency: Thin (IDDSI 0)         Activity Instructions     Activity as Tolerated           Additional Instructions for the Follow-ups that You Need to Schedule     Discharge Follow-up with PCP   As directed       Currently Documented PCP:    Akosua Staley APRN    PCP Phone Number:    [phone]     Follow Up Details: see in 1-2 weeks         Discharge Follow-up with Specified Provider: Psychiatry; 2 Weeks   As directed      To: Psychiatry    Follow Up: 2 Weeks            Contact information for follow-up providers     Akosua Staley APRN .    Specialty: Nurse Practitioner  Why: see in 1-2 weeks  Contact information:  [address] [facility] [address]  [facility] [address]  [phone]                   Contact information for after-discharge care     Destination     [facility] .    Service: Skilled Nursing  Contact information:  [facility] [facility] [address] [phone]                            Test Results Pending at Discharge     Yessica Kendrick, DERRICK  03/14/23  11:42 EDT    Discharge time spent greater than 30 minutes.

## 2023-03-24 NOTE — PROGRESS NOTES
"Enter Query Response Below      Query Response: Sepsis with encephalopathy, present on admission ruled in             If applicable, please update the problem list.     Patient: Krupa Mcmanus        : 1955  Account: [id number]           Admit Date: 3/7/2023        How to Respond to this query:       a. Click New Note     b. Answer query within the yellow box.                c. Update the Problem List, if applicable.      If you have any questions about this query contact me at: [email or web address]    Dr. Nascimento    67-year-old female with history of dementia admitted 3/7/23 with metabolic encephalopathy and pneumonia per the dc summary. Labs & vitals on admit- WBC 16.48, lactic acid 3.8, Temp 100.5, HR   Treatment- 1L NS bolus- (3/7 1446), 500mL NS bolus (3/8 0135), IV NS 125mL/hr (3/7 2041- 3/10 1218), IV Rocephin- Indications of Use: SEPSIS (3/7-13)  ED note documents \"Sepsis with encephalopathy without septic shock, due to unspecified organism\"    Please clarify the following:  Sepsis with encephalopathy, present on admission ruled in  Sepsis with encephalopathy ruled out  Other- specify_____  Unable to determine    By submitting this query, we are merely seeking further clarification of documentation to accurately reflect all conditions that you are monitoring, evaluating, treating or that extend the hospitalization or utilize additional resources of care. Please utilize your independent clinical judgment when addressing the question(s) above.     This query and your response, once completed, will be entered into the legal medical record.    Sincerely,  Yessica López MSN, RN, CCDS  Clinical Documentation Integrity Program   "

## 2023-04-26 ENCOUNTER — HOSPITAL ENCOUNTER (EMERGENCY)
Facility: HOSPITAL | Age: 68
Discharge: HOME | End: 2023-04-26
Attending: EMERGENCY MEDICINE
Payer: MEDICARE

## 2023-04-26 VITALS
OXYGEN SATURATION: 98 % | HEIGHT: 64 IN | HEART RATE: 84 BPM | WEIGHT: 217 LBS | RESPIRATION RATE: 18 BRPM | SYSTOLIC BLOOD PRESSURE: 120 MMHG | BODY MASS INDEX: 37.05 KG/M2 | DIASTOLIC BLOOD PRESSURE: 53 MMHG | TEMPERATURE: 98.7 F

## 2023-04-26 DIAGNOSIS — Z87.898 HISTORY OF DIARRHEA: ICD-10-CM

## 2023-04-26 DIAGNOSIS — F03.918 DEMENTIA WITH BEHAVIORAL DISTURBANCE: Primary | ICD-10-CM

## 2023-04-26 LAB
ALBUMIN SERPL-MCNC: 4.3 G/DL (ref 3.5–5.2)
ALBUMIN/GLOB SERPL: 1.3 G/DL
ALP SERPL-CCNC: 112 U/L (ref 39–117)
ALT SERPL W P-5'-P-CCNC: 23 U/L (ref 1–33)
ANION GAP SERPL CALCULATED.3IONS-SCNC: 13 MMOL/L (ref 5–15)
AST SERPL-CCNC: 29 U/L (ref 1–32)
BASOPHILS # BLD AUTO: 0.06 10*3/MM3 (ref 0–0.2)
BASOPHILS NFR BLD AUTO: 0.7 % (ref 0–1.5)
BILIRUB SERPL-MCNC: 0.3 MG/DL (ref 0–1.2)
BILIRUB UR QL STRIP: NEGATIVE
BUN SERPL-MCNC: 22 MG/DL (ref 8–23)
BUN/CREAT SERPL: 27.8 (ref 7–25)
CALCIUM SPEC-SCNC: 9.3 MG/DL (ref 8.6–10.5)
CHLORIDE SERPL-SCNC: 108 MMOL/L (ref 98–107)
CLARITY UR: CLEAR
CO2 SERPL-SCNC: 22 MMOL/L (ref 22–29)
COLOR UR: ABNORMAL
CREAT SERPL-MCNC: 0.79 MG/DL (ref 0.57–1)
D-LACTATE SERPL-SCNC: 3.4 MMOL/L (ref 0.5–2)
D-LACTATE SERPL-SCNC: 3.5 MMOL/L (ref 0.5–2)
DEPRECATED RDW RBC AUTO: 43.5 FL (ref 37–54)
EGFRCR SERPLBLD CKD-EPI 2021: 81.1 ML/MIN/1.73
EOSINOPHIL # BLD AUTO: 0.13 10*3/MM3 (ref 0–0.4)
EOSINOPHIL NFR BLD AUTO: 1.4 % (ref 0.3–6.2)
ERYTHROCYTE [DISTWIDTH] IN BLOOD BY AUTOMATED COUNT: 14.4 % (ref 12.3–15.4)
GLOBULIN UR ELPH-MCNC: 3.2 GM/DL
GLUCOSE SERPL-MCNC: 142 MG/DL (ref 65–99)
GLUCOSE UR STRIP-MCNC: NEGATIVE MG/DL
HCT VFR BLD AUTO: 40.5 % (ref 34–46.6)
HGB BLD-MCNC: 12.7 G/DL (ref 12–15.9)
HGB UR QL STRIP.AUTO: NEGATIVE
IMM GRANULOCYTES # BLD AUTO: 0.02 10*3/MM3 (ref 0–0.05)
IMM GRANULOCYTES NFR BLD AUTO: 0.2 % (ref 0–0.5)
KETONES UR QL STRIP: ABNORMAL
LEUKOCYTE ESTERASE UR QL STRIP.AUTO: NEGATIVE
LIPASE SERPL-CCNC: 24 U/L (ref 13–60)
LYMPHOCYTES # BLD AUTO: 2.59 10*3/MM3 (ref 0.7–3.1)
LYMPHOCYTES NFR BLD AUTO: 28.9 % (ref 19.6–45.3)
MAGNESIUM SERPL-MCNC: 2 MG/DL (ref 1.6–2.4)
MCH RBC QN AUTO: 26.4 PG (ref 26.6–33)
MCHC RBC AUTO-ENTMCNC: 31.4 G/DL (ref 31.5–35.7)
MCV RBC AUTO: 84.2 FL (ref 79–97)
MONOCYTES # BLD AUTO: 0.58 10*3/MM3 (ref 0.1–0.9)
MONOCYTES NFR BLD AUTO: 6.5 % (ref 5–12)
NEUTROPHILS NFR BLD AUTO: 5.59 10*3/MM3 (ref 1.7–7)
NEUTROPHILS NFR BLD AUTO: 62.3 % (ref 42.7–76)
NITRITE UR QL STRIP: NEGATIVE
NRBC BLD AUTO-RTO: 0 /100 WBC (ref 0–0.2)
PH UR STRIP.AUTO: 5.5 [PH] (ref 5–8)
PLATELET # BLD AUTO: 296 10*3/MM3 (ref 140–450)
PMV BLD AUTO: 10.2 FL (ref 6–12)
POTASSIUM SERPL-SCNC: 4.3 MMOL/L (ref 3.5–5.2)
PROT SERPL-MCNC: 7.5 G/DL (ref 6–8.5)
PROT UR QL STRIP: ABNORMAL
RBC # BLD AUTO: 4.81 10*6/MM3 (ref 3.77–5.28)
SODIUM SERPL-SCNC: 143 MMOL/L (ref 136–145)
SP GR UR STRIP: >=1.03 (ref 1–1.03)
UROBILINOGEN UR QL STRIP: ABNORMAL
VALPROATE SERPL-MCNC: 19 MCG/ML (ref 50–125)
WBC NRBC COR # BLD: 8.97 10*3/MM3 (ref 3.4–10.8)

## 2023-04-26 PROCEDURE — 83605 ASSAY OF LACTIC ACID: CPT | Performed by: EMERGENCY MEDICINE

## 2023-04-26 PROCEDURE — 96361 HYDRATE IV INFUSION ADD-ON: CPT

## 2023-04-26 PROCEDURE — 81003 URINALYSIS AUTO W/O SCOPE: CPT | Performed by: EMERGENCY MEDICINE

## 2023-04-26 PROCEDURE — 85025 COMPLETE CBC W/AUTO DIFF WBC: CPT | Performed by: EMERGENCY MEDICINE

## 2023-04-26 PROCEDURE — 83690 ASSAY OF LIPASE: CPT | Performed by: EMERGENCY MEDICINE

## 2023-04-26 PROCEDURE — 80053 COMPREHEN METABOLIC PANEL: CPT | Performed by: EMERGENCY MEDICINE

## 2023-04-26 PROCEDURE — 96365 THER/PROPH/DIAG IV INF INIT: CPT

## 2023-04-26 PROCEDURE — 80164 ASSAY DIPROPYLACETIC ACD TOT: CPT | Performed by: EMERGENCY MEDICINE

## 2023-04-26 PROCEDURE — 96375 TX/PRO/DX INJ NEW DRUG ADDON: CPT

## 2023-04-26 PROCEDURE — 25010000002 LORAZEPAM PER 2 MG: Performed by: EMERGENCY MEDICINE

## 2023-04-26 PROCEDURE — 83735 ASSAY OF MAGNESIUM: CPT | Performed by: EMERGENCY MEDICINE

## 2023-04-26 PROCEDURE — 96372 THER/PROPH/DIAG INJ SC/IM: CPT

## 2023-04-26 PROCEDURE — 99283 EMERGENCY DEPT VISIT LOW MDM: CPT

## 2023-04-26 PROCEDURE — 25010000002 ZIPRASIDONE MESYLATE PER 10 MG: Performed by: EMERGENCY MEDICINE

## 2023-04-26 RX ORDER — ZIPRASIDONE MESYLATE 20 MG/ML
10 INJECTION, POWDER, LYOPHILIZED, FOR SOLUTION INTRAMUSCULAR ONCE
Status: COMPLETED | OUTPATIENT
Start: 2023-04-26 | End: 2023-04-26

## 2023-04-26 RX ORDER — SODIUM CHLORIDE 0.9 % (FLUSH) 0.9 %
10 SYRINGE (ML) INJECTION AS NEEDED
Status: DISCONTINUED | OUTPATIENT
Start: 2023-04-26 | End: 2023-04-26 | Stop reason: HOSPADM

## 2023-04-26 RX ORDER — SODIUM CHLORIDE 9 MG/ML
125 INJECTION, SOLUTION INTRAVENOUS CONTINUOUS
Status: DISCONTINUED | OUTPATIENT
Start: 2023-04-26 | End: 2023-04-26 | Stop reason: HOSPADM

## 2023-04-26 RX ORDER — LORAZEPAM 2 MG/ML
1 INJECTION INTRAMUSCULAR ONCE
Status: COMPLETED | OUTPATIENT
Start: 2023-04-26 | End: 2023-04-26

## 2023-04-26 RX ADMIN — VALPROATE SODIUM 500 MG: 100 INJECTION, SOLUTION INTRAVENOUS at 19:51

## 2023-04-26 RX ADMIN — ZIPRASIDONE MESYLATE 10 MG: 20 INJECTION, POWDER, LYOPHILIZED, FOR SOLUTION INTRAMUSCULAR at 18:35

## 2023-04-26 RX ADMIN — ZIPRASIDONE MESYLATE 10 MG: 20 INJECTION, POWDER, LYOPHILIZED, FOR SOLUTION INTRAMUSCULAR at 17:23

## 2023-04-26 RX ADMIN — SODIUM CHLORIDE 125 ML/HR: 9 INJECTION, SOLUTION INTRAVENOUS at 17:59

## 2023-04-26 RX ADMIN — LORAZEPAM 1 MG: 2 INJECTION INTRAMUSCULAR; INTRAVENOUS at 19:11

## 2023-04-26 NOTE — ED PROVIDER NOTES
" EMERGENCY DEPARTMENT ENCOUNTER    Room Number:  13/13  Date seen:  4/27/2023  PCP: Akosua Staley APRN  Historian: Patient and   Relevant information provided by sources other than the patient, review of external records, and social determinants of health may be included in the HPI section.      HPI:  Chief Complaint: Diarrhea  Additional historical features obtained directly from: Patient's  gives most of the history, and says that she has dementia with chronic behavioral disturbance in the the behavior she is displaying currently is actually her baseline.  The reason he brought her over is that she was having diarrhea today.  Context: Krupa Mcmanus is a 67 y.o. female who presents to the ED c/o 67-year-old female with dementia and bipolar disorder with affective psychosis who was brought in by her  today secondary to diarrhea.  Neither the patient nor her  are very good historians, and he is obviously very frustrated with her chronic agitation state and seemed more frustrated and tired than anything else.  Apparently she has struggled with these issues for greater than 30 years and he has been the primary caretaker for that entire time.  He said very little about the diarrhea, and I can't get many details about it during questioning, even when I ask him to step outside the room.   Patient has no complaints, but she is agitated and said \"I just want to go home\".    Ultimately were able to give her some Geodon x2 which had very little effect and then Ativan seem to help calm her a little.  The  was able to leave and go home for a while and we were able to start a work-up.  We were not however able to get any additional information.  Of note, the patient has not had any diarrhea or bowel movement since her arrival        Initial impression including social determinants which will impact the assessment significant amount of of social limitations in this case given that there " is very poor historical information, the patient is quite agitated and uncooperative, and the  is frustrated and we advised him to take a break for a while and even go home if he wanted that we would call him..          PAST MEDICAL HISTORY  Active Ambulatory Problems     Diagnosis Date Noted   • Affective psychosis, bipolar 07/11/2018   • Essential hypertension 07/12/2018   • Mild intermittent asthma without complication 07/12/2018   • Hypokalemia 07/12/2018   • Elevated liver enzymes 07/12/2018   • Type 2 diabetes mellitus with hyperglycemia 07/12/2018   • Acute UTI 08/18/2022   • Metabolic encephalopathy 08/19/2022   • Dementia with behavioral disturbance 08/19/2022   • Encephalopathy 08/29/2022   • Sepsis with encephalopathy without septic shock, due to unspecified organism 03/07/2023   • Pneumonia 03/07/2023   • Social problem 03/07/2023     Resolved Ambulatory Problems     Diagnosis Date Noted   • Leukocytosis 07/12/2018     Past Medical History:   Diagnosis Date   • Alzheimer disease    • Anxiety    • Bipolar 1 disorder    • Dementia    • Depression    • Diabetes mellitus    • GERD (gastroesophageal reflux disease)    • OCD (obsessive compulsive disorder)    • Rheumatoid arthritis    • UTI (urinary tract infection)          PAST SURGICAL HISTORY  Past Surgical History:   Procedure Laterality Date   • CHOLECYSTECTOMY     • HYSTERECTOMY           FAMILY HISTORY  Family History   Problem Relation Age of Onset   • Cancer Father         unknown         SOCIAL HISTORY  Social History     Socioeconomic History   • Marital status:    Tobacco Use   • Smoking status: Former   Vaping Use   • Vaping Use: Never used   Substance and Sexual Activity   • Alcohol use: Never   • Drug use: Never         ALLERGIES  Levofloxacin, Sulfamethoxazole, Amoxicillin, Benadryl [diphenhydramine], Ceclor [cefaclor], Penicillins, and Zyprexa [olanzapine]          PHYSICAL EXAM  ED Triage Vitals [04/26/23 1707]   Temp Heart  Rate Resp BP SpO2   98.7 °F (37.1 °C) 112 18 -- 95 %      Temp src Heart Rate Source Patient Position BP Location FiO2 (%)   -- -- -- -- --         Physical Exam      GENERAL: Awake and alert, agitated, seemed very confused and frustrated  HENT: nares patent, NCAT  EYES: no scleral icterus, PERRL, EOMI  CV: Sinus tachycardia, distal pulses symmetric and palpable  RESPIRATORY: normal effort  ABDOMEN: soft, nondistended nontender with normal bowel sound  MUSCULOSKELETAL: no deformity  NEURO: alert, moves all extremities, is ambulatory clearly and does not appear to have any focal deficits  PSYCH: Very agitated  SKIN: warm, dry with no rash        LAB RESULTS  Recent Results (from the past 24 hour(s))   Comprehensive Metabolic Panel    Collection Time: 04/26/23  5:58 PM    Specimen: Blood   Result Value Ref Range    Glucose 142 (H) 65 - 99 mg/dL    BUN 22 8 - 23 mg/dL    Creatinine 0.79 0.57 - 1.00 mg/dL    Sodium 143 136 - 145 mmol/L    Potassium 4.3 3.5 - 5.2 mmol/L    Chloride 108 (H) 98 - 107 mmol/L    CO2 22.0 22.0 - 29.0 mmol/L    Calcium 9.3 8.6 - 10.5 mg/dL    Total Protein 7.5 6.0 - 8.5 g/dL    Albumin 4.3 3.5 - 5.2 g/dL    ALT (SGPT) 23 1 - 33 U/L    AST (SGOT) 29 1 - 32 U/L    Alkaline Phosphatase 112 39 - 117 U/L    Total Bilirubin 0.3 0.0 - 1.2 mg/dL    Globulin 3.2 gm/dL    A/G Ratio 1.3 g/dL    BUN/Creatinine Ratio 27.8 (H) 7.0 - 25.0    Anion Gap 13.0 5.0 - 15.0 mmol/L    eGFR 81.1 >60.0 mL/min/1.73   Magnesium    Collection Time: 04/26/23  5:58 PM    Specimen: Blood   Result Value Ref Range    Magnesium 2.0 1.6 - 2.4 mg/dL   Lipase    Collection Time: 04/26/23  5:58 PM    Specimen: Blood   Result Value Ref Range    Lipase 24 13 - 60 U/L   Valproic Acid Level, Total    Collection Time: 04/26/23  5:58 PM    Specimen: Blood   Result Value Ref Range    Valproic Acid 19.0 (L) 50.0 - 125.0 mcg/mL   Lactic Acid, Plasma    Collection Time: 04/26/23  5:58 PM    Specimen: Blood   Result Value Ref Range     "Lactate 3.4 (C) 0.5 - 2.0 mmol/L   CBC Auto Differential    Collection Time: 04/26/23  5:58 PM    Specimen: Blood   Result Value Ref Range    WBC 8.97 3.40 - 10.80 10*3/mm3    RBC 4.81 3.77 - 5.28 10*6/mm3    Hemoglobin 12.7 12.0 - 15.9 g/dL    Hematocrit 40.5 34.0 - 46.6 %    MCV 84.2 79.0 - 97.0 fL    MCH 26.4 (L) 26.6 - 33.0 pg    MCHC 31.4 (L) 31.5 - 35.7 g/dL    RDW 14.4 12.3 - 15.4 %    RDW-SD 43.5 37.0 - 54.0 fl    MPV 10.2 6.0 - 12.0 fL    Platelets 296 140 - 450 10*3/mm3    Neutrophil % 62.3 42.7 - 76.0 %    Lymphocyte % 28.9 19.6 - 45.3 %    Monocyte % 6.5 5.0 - 12.0 %    Eosinophil % 1.4 0.3 - 6.2 %    Basophil % 0.7 0.0 - 1.5 %    Immature Grans % 0.2 0.0 - 0.5 %    Neutrophils, Absolute 5.59 1.70 - 7.00 10*3/mm3    Lymphocytes, Absolute 2.59 0.70 - 3.10 10*3/mm3    Monocytes, Absolute 0.58 0.10 - 0.90 10*3/mm3    Eosinophils, Absolute 0.13 0.00 - 0.40 10*3/mm3    Basophils, Absolute 0.06 0.00 - 0.20 10*3/mm3    Immature Grans, Absolute 0.02 0.00 - 0.05 10*3/mm3    nRBC 0.0 0.0 - 0.2 /100 WBC   Urinalysis With Microscopic If Indicated (No Culture) - Straight Cath    Collection Time: 04/26/23  7:41 PM    Specimen: Straight Cath; Urine   Result Value Ref Range    Color, UA Dark Yellow (A) Yellow, Straw    Appearance, UA Clear Clear    pH, UA 5.5 5.0 - 8.0    Specific Gravity, UA >=1.030 1.005 - 1.030    Glucose, UA Negative Negative    Ketones, UA 15 mg/dL (1+) (A) Negative    Bilirubin, UA Negative Negative    Blood, UA Negative Negative    Protein, UA Trace (A) Negative    Leuk Esterase, UA Negative Negative    Nitrite, UA Negative Negative    Urobilinogen, UA 1.0 E.U./dL 0.2 - 1.0 E.U./dL   STAT Lactic Acid, Reflex    Collection Time: 04/26/23  9:07 PM    Specimen: Blood   Result Value Ref Range    Lactate 3.5 (C) 0.5 - 2.0 mmol/L       Ordered the above labs and my independent interpretation of these results are discussed in the section labeled \"ED course\" below        RADIOLOGY  No Radiology Exams " "Resulted Within Past 24 Hours    Ordered the above noted radiological studies.  Independently interpreted by me in PACS.  My independent interpretation of these results are discussed in the section below labeled \"ED course\"        PROCEDURES  Procedures          MEDICATIONS GIVEN IN ER  Medications   ziprasidone (GEODON) injection 10 mg (10 mg Intramuscular Given 4/26/23 1723)   ziprasidone (GEODON) injection 10 mg (10 mg Intramuscular Given 4/26/23 1835)   valproate (DEPACON) 500 mg in sodium chloride 0.9 % 50 mL IVPB (0 mg Intravenous Stopped 4/26/23 2114)   LORazepam (ATIVAN) injection 1 mg (1 mg Intravenous Given 4/26/23 1911)                   MEDICAL DECISION MAKING and INDEPENDENT INTERPRETATIONS      Everything documented below this statement is the \"ED course\" section which I have referred to above.  Everything discussed in the following section constitutes MY INDEPENDENT INTERPRETATIONS of the lab work, EKGs, and radiology studies, and all of my personal conversations with other providers, and all of my independent decision making regarding this patient are discussed below.      ED Course as of 04/27/23 1435   u Apr 27, 2023   1433 CBC shows normal white blood cell count and hemoglobin. [DP]   1433 Chemistry shows normal renal function and electrolytes.  Perhaps a little volume depleted by the BUN to creatinine ratio [DP]   1433 Urinalysis unremarkable except for small ketones and lipase is normal [DP]   1434 Lactic acid is elevated, but I do not see any significant cause for lactic acidosis.  She is hemodynamically stable with no signs of significant infection, she is possibly a little hypovolemic and we will give her some IV fluids.  Her abdomen is completely benign, she has not had any bowel movements or vomiting while here, and I just do not think that it has a lot of clinical significance here [DP]   1434 She did not produce a stool for us to test [DP]   1435 We are finally able to get her more calm " "and relaxed.  She was still obviously uncomfortable being there, but she would at least stay in her bed at that point and stop calling out as much.     [DP]   1435 We will call the  and ask him to return so we can talk.  He was obviously much more relaxed and able to have a conversation.  He clearly stated that this is absolutely her baseline and that he deals with it every day.  She has been to inpatient psychiatric facilities [DP]      ED Course User Index  [DP] Saul Negro MD         Everything documented above with this statement, in the ED course section constitutes my independent interpretation of lab work EKG and radiology studies along with my personal conversations with other providers and my independent medical decision making.  This statement will not be repeated before each data point, but they are all my independent interpretation needs contained within the \"ED course\" section.             All appropriate hygiene and PPE requirements were satisfied with this patient encounter      FINAL DIAGNOSES  Final diagnoses:   Dementia with behavioral disturbance   History of diarrhea           DISPOSITION  Discharge          Latest Documented Vital Signs:  As of 14:32 EDT  BP- 120/53 HR- 84 Temp- 98.7 °F (37.1 °C) O2 sat- 98%        --    Please note that portions of this were completed with a voice recognition program.       Note Disclaimer: At [facility], we believe that sharing information builds trust and better relationships. You are receiving this note because you are receiving care at [facility] or recently visited. It is possible you will see health information before a provider has talked with you about it. This kind of information can be easy to misunderstand. To help you fully understand what it      Saul Negro MD  04/27/23 1317    "

## 2023-04-26 NOTE — ED NOTES
Patient to ER via car from home for diarrhea x 3 days    Patient has recently been taking miralax    Patient has dementia and bipolar disporder

## 2023-04-26 NOTE — ED NOTES
Pt's  states the pt's had diarrhea for 3 days. Pt denies N/V, fever, or abdominal pain. Pt recently treated for pneumonia. Pt's  states pt is at baseline. Pt A&Ox2.

## 2023-04-26 NOTE — CASE MANAGEMENT/SOCIAL WORK
Provider notified that the patient has an insurance plan that is non-participating with [employer].

## 2023-08-27 NOTE — PROGRESS NOTES
"The patient offers no new complaints when seen today.   reports that she \"sounds all right\" with regards to mentation.  He reports that he is hoping that she will be admitted to rehabilitation following discharge, but states \"we are trying to keep that secret from her\".  " normal balance